# Patient Record
Sex: MALE | Race: WHITE | NOT HISPANIC OR LATINO | Employment: FULL TIME | URBAN - METROPOLITAN AREA
[De-identification: names, ages, dates, MRNs, and addresses within clinical notes are randomized per-mention and may not be internally consistent; named-entity substitution may affect disease eponyms.]

---

## 2017-12-15 ENCOUNTER — OFFICE VISIT (OUTPATIENT)
Dept: URGENT CARE | Facility: CLINIC | Age: 32
End: 2017-12-15
Payer: COMMERCIAL

## 2017-12-15 PROCEDURE — 99203 OFFICE O/P NEW LOW 30 MIN: CPT

## 2017-12-16 NOTE — PROGRESS NOTES
Assessment  1  Viral gastroenteritis (008 8) (A08 4)    Discussion/Summary  Discussion Summary:   Appears viral  Recommend small sips of water to stay hydrated  Advance diet as tolerated  Medication Side Effects Reviewed: Possible side effects of new medications were reviewed with the patient/guardian today  Understands and agrees with treatment plan: The treatment plan was reviewed with the patient/guardian  The patient/guardian understands and agrees with the treatment plan      Chief Complaint  1  Diarrhea  Chief Complaint Free Text Note Form: Pt c/o diarrhea and vomiting since yesterday  History of Present Illness  HPI: 74-year-old male here with diarrhea vomiting that started yesterday  Was feeling a little nauseous  Has been able to hold down some fluids  Denies any fever or chills  States that he needs a note for work  Diarrhea: Dulcy Maddison presents with complaints of diarrhea  Associated symptoms include nausea-- and-- vomiting, but-- no fever-- and-- no chills  Review of Systems  Focused-Male:  Constitutional: feeling poorly-- and-- feeling tired, but-- as noted in HPI,-- no fever-- and-- no chills  ENT: no complaints of earache, no loss of hearing, no nosebleeds or nasal discharge, no sore throat or hoarseness  Respiratory: no complaints of shortness of breath, no wheezing or cough, no dyspnea on exertion, no orthopnea or PND  Gastrointestinal: abdominal pain,-- nausea,-- vomiting-- and-- diarrhea, but-- as noted in HPI  ROS Reviewed:   ROS reviewed  Past Medical History  Active Problems And Past Medical History Reviewed: The active problems and past medical history were reviewed and updated today  Family History  Family History Reviewed: The family history was reviewed and updated today  Social History  Social History Reviewed: The social history was reviewed and updated today  The social history was reviewed and is unchanged        Surgical History  Surgical History Reviewed: The surgical history was reviewed and updated today  Current Meds   1  Januvia 25 MG Oral Tablet; Therapy: (Recorded:[id number]) to Recorded   2  MetFORMIN HCl - 500 MG Oral Tablet; Therapy: (Recorded:[id number]) to Recorded  Medication List Reviewed: The medication list was reviewed and updated today  Allergies  1  No Known Drug Allergies    Vitals  Signs   Recorded: [id number] 01:33PM   Temperature: 97 F  Heart Rate: 83  Respiration: 18  Systolic: 325  Diastolic: 78  Weight: 233 lb 0 22 oz  O2 Saturation: 96    Physical Exam   Constitutional  General appearance: No acute distress, well appearing and well nourished  Ears, Nose, Mouth, and Throat  External inspection of ears and nose: Normal    Otoscopic examination: Tympanic membrance translucent with normal light reflex  Canals patent without erythema  Nasal mucosa, septum, and turbinates: Normal without edema or erythema  Oropharynx: Normal with no erythema, edema, exudate or lesions  Pulmonary  Respiratory effort: No increased work of breathing or signs of respiratory distress  Auscultation of lungs: Clear to auscultation  Cardiovascular  Auscultation of heart: Normal rate and rhythm, normal S1 and S2, without murmurs  Abdomen  Abdomen: Non-tender, no masses         Signatures   Electronically signed by : Redd Abel [facility]; Dec 15 2017  1:50PM EST                       (Author)    Electronically signed by : JESSICA Garcia ; Dec 15 2017  4:06PM EST                       (Co-author)

## 2018-01-02 ENCOUNTER — OFFICE VISIT (OUTPATIENT)
Dept: URGENT CARE | Facility: CLINIC | Age: 33
End: 2018-01-02
Payer: COMMERCIAL

## 2018-01-02 PROCEDURE — 99213 OFFICE O/P EST LOW 20 MIN: CPT

## 2018-01-04 NOTE — PROGRESS NOTES
Assessment   1  Acute maxillary sinusitis, recurrence not specified (461 0) (J01 00)    Plan   Acute maxillary sinusitis, recurrence not specified    · Amoxicillin-Pot Clavulanate 875-125 MG Oral Tablet (Augmentin); TAKE 1 TABLET    EVERY 12 HOURS UNTIL GONE    Discussion/Summary   Discussion Summary:    Augmentin as directed  Discussed viral versus bacterial versus allergic infection  Call or return for problems and concerns  Medication Side Effects Reviewed: Possible side effects of new medications were reviewed with the patient/guardian today  Understands and agrees with treatment plan: The treatment plan was reviewed with the patient/guardian  The patient/guardian understands and agrees with the treatment plan      Chief Complaint   1  Cold Symptoms  Chief Complaint Free Text Note Form: Ongoing non-productive cough and congestion x 6 weeks sometimes like water comes up when I cough      History of Present Illness   HPI: sinus congestion and cough x 6 weeks      Review of Systems   Focused-Male:      Constitutional: no fever or chills, feels well, no tiredness, no recent weight loss or weight gain  ENT: nasal discharge  Cardiovascular: no complaints of slow or fast heart rate, no chest pain, no palpitations, no leg claudication or lower extremity edema  Respiratory: cough  Gastrointestinal: no complaints of abdominal pain, no constipation, no nausea or vomiting, no diarrhea or bloody stools  Genitourinary: no complaints of dysuria or incontinence, no hesitancy, no nocturia, no genital lesion, no inadequacy of penile erection  Musculoskeletal: no complaints of arthralgia, no myalgia, no joint swelling or stiffness, no limb pain or swelling  Integumentary: no complaints of skin rash or lesion, no itching or dry skin, no skin wounds  Neurological: no complaints of headache, no confusion, no numbness or tingling, no dizziness or fainting  Active Problems   1   Viral gastroenteritis (008 8) (A08 4)    Past Medical History   Active Problems And Past Medical History Reviewed: The active problems and past medical history were reviewed and updated today  Family History   Family History Reviewed: The family history was reviewed and updated today  Social History   Social History Reviewed: The social history was reviewed and updated today  Surgical History   Surgical History Reviewed: The surgical history was reviewed and updated today  Current Meds    1  Januvia 25 MG Oral Tablet; Therapy: (Recorded:[id number]) to Recorded   2  MetFORMIN HCl - 500 MG Oral Tablet; Therapy: (Recorded:[id number]) to Recorded  Medication List Reviewed: The medication list was reviewed and updated today  Allergies   1  No Known Drug Allergies    Vitals   Signs   Recorded: 02Jan2018 02:22PM   Temperature: 97 8 F  Heart Rate: 96  Respiration: 18  Systolic: 690  Diastolic: 64  O2 Saturation: 100    Physical Exam        Constitutional      General appearance: No acute distress, well appearing and well nourished  Eyes      Conjunctiva and lids: No swelling, erythema, or discharge  Ears, Nose, Mouth, and Throat      External inspection of ears and nose: Normal        Otoscopic examination: Tympanic membrance translucent with normal light reflex  Canals patent without erythema  Nasal mucosa, septum, and turbinates: Normal without edema or erythema  Oropharynx: Normal with no erythema, edema, exudate or lesions  -- + sinus congestion noted  Pulmonary      Respiratory effort: No increased work of breathing or signs of respiratory distress  Auscultation of lungs: Clear to auscultation  Cardiovascular      Auscultation of heart: Normal rate and rhythm, normal S1 and S2, without murmurs  Lymphatic      Palpation of lymph nodes in neck: No lymphadenopathy         Musculoskeletal      Gait and station: Normal        Digits and nails: Normal without clubbing or cyanosis  Inspection/palpation of joints, bones, and muscles: Normal        Neurologic      Cranial nerves: Cranial nerves 2-12 intact         Psychiatric      Orientation to person, place and time: Normal        Mood and affect: Normal        Signatures    Electronically signed by : Adrian Tompkins; Jan 2 2018  3:26PM EST                       (Author)     Electronically signed by : Adrian Tompkins; Jan 2 2018  3:55PM EST                       (Author)     Electronically signed by : JESSICA Frank ; Tru  3 2018  5:23PM EST                       (Co-author)

## 2018-01-23 VITALS
HEART RATE: 83 BPM | DIASTOLIC BLOOD PRESSURE: 78 MMHG | SYSTOLIC BLOOD PRESSURE: 133 MMHG | OXYGEN SATURATION: 96 % | TEMPERATURE: 97 F | RESPIRATION RATE: 18 BRPM | WEIGHT: 276.02 LBS

## 2018-01-23 VITALS
HEART RATE: 96 BPM | RESPIRATION RATE: 18 BRPM | TEMPERATURE: 97.8 F | OXYGEN SATURATION: 100 % | DIASTOLIC BLOOD PRESSURE: 64 MMHG | SYSTOLIC BLOOD PRESSURE: 122 MMHG

## 2018-01-24 NOTE — MISCELLANEOUS
Message  Return to work or school:   Naun Reyes is under my professional care   He was seen in my office on 12/15/17   He is able to return to work on  12/16/17            Signatures   Electronically signed by : Erik Quinonez, [facility]; Dec 15 2017  1:44PM EST                       (Author)

## 2018-03-09 ENCOUNTER — OFFICE VISIT (OUTPATIENT)
Dept: URGENT CARE | Facility: CLINIC | Age: 33
End: 2018-03-09
Payer: COMMERCIAL

## 2018-03-09 VITALS
SYSTOLIC BLOOD PRESSURE: 162 MMHG | DIASTOLIC BLOOD PRESSURE: 93 MMHG | HEART RATE: 82 BPM | RESPIRATION RATE: 18 BRPM | OXYGEN SATURATION: 96 % | TEMPERATURE: 97.6 F

## 2018-03-09 DIAGNOSIS — K52.9 NONINFECTIOUS GASTROENTERITIS, UNSPECIFIED TYPE: Primary | ICD-10-CM

## 2018-03-09 PROCEDURE — 99213 OFFICE O/P EST LOW 20 MIN: CPT | Performed by: NURSE PRACTITIONER

## 2018-03-09 NOTE — PROGRESS NOTES
[facility]        NAME: Jackson Paige is a 34 y o  male  : 1985    MRN: [id number]  DATE: 2018  TIME: 2:11 PM    Assessment and Plan   Noninfectious gastroenteritis, unspecified type [K52 9]  1  Noninfectious gastroenteritis, unspecified type           Patient Instructions       Follow up with PCP in 3-5 days  Proceed to  ER if symptoms worsen  Chief Complaint     Chief Complaint   Patient presents with    Diarrhea     Pt c/o diarrhea and a fever since yesterday  History of Present Illness       Diarrhea    The current episode started yesterday  Episode frequency: 4-5 times  The stool consistency is described as watery  Associated symptoms include abdominal pain, a fever (last night) and headaches  Pertinent negatives include no vomiting  Review of Systems   Review of Systems   Constitutional: Positive for fever (last night)  HENT: Negative  Eyes: Negative  Respiratory: Negative  Cardiovascular: Negative  Gastrointestinal: Positive for abdominal pain and diarrhea  Negative for vomiting  Genitourinary: Negative  Musculoskeletal: Negative  Neurological: Positive for headaches  Psychiatric/Behavioral: Negative  Current Medications       Current Outpatient Prescriptions:     metFORMIN (GLUCOPHAGE) 500 mg tablet, Take by mouth, Disp: , Rfl:     sitaGLIPtin (JANUVIA) 25 mg tablet, Take by mouth, Disp: , Rfl:     Current Allergies     Allergies as of 2018    (No Known Allergies)            The following portions of the patient's history were reviewed and updated as appropriate: allergies, current medications, past family history, past medical history, past social history, past surgical history and problem list      No past medical history on file  No past surgical history on file  No family history on file  Medications have been verified          Objective   /93   Pulse 82   Temp 97 6 °F (36 4 °C)   Resp 18   SpO2 96%        Physical Exam     Physical Exam   Constitutional: He is oriented to person, place, and time  He appears well-developed and well-nourished  No distress  HENT:   Head: Normocephalic and atraumatic  Right Ear: External ear normal    Left Ear: External ear normal    Mouth/Throat: Oropharynx is clear and moist    Eyes: Conjunctivae and EOM are normal  Pupils are equal, round, and reactive to light  Neck: Normal range of motion  Neck supple  Cardiovascular: Normal rate, regular rhythm and normal heart sounds  Pulmonary/Chest: Effort normal and breath sounds normal  No respiratory distress  He has no wheezes  He has no rales  Abdominal: Bowel sounds are normal  He exhibits no distension and no mass  There is no tenderness  There is no rebound and no guarding  Lymphadenopathy:     He has no cervical adenopathy  Neurological: He is alert and oriented to person, place, and time  He has normal reflexes  Skin: Skin is warm and dry  No rash noted  He is not diaphoretic  Psychiatric: He has a normal mood and affect  Nursing note and vitals reviewed  Advised him to adhere to BRAT diet for the next 48 hours  Avoid dairy products the next 48 hours

## 2018-03-09 NOTE — PATIENT INSTRUCTIONS
Gastroenteritis   WHAT YOU NEED TO KNOW:   Gastroenteritis, or stomach flu, is an infection of the stomach and intestines  DISCHARGE INSTRUCTIONS:   Call 911 for any of the following:   · You have trouble breathing or a very fast pulse  Return to the emergency department if:   · You see blood in your diarrhea  · You cannot stop vomiting  · You have not urinated for 12 hours  · You feel like you are going to faint  Contact your healthcare provider if:   · You have a fever  · You continue to vomit or have diarrhea, even after treatment  · You see worms in your diarrhea  · Your mouth or eyes are dry  You are not urinating as much or as often  · You have questions or concerns about your condition or care  Medicines:   · Medicines  may be given to stop vomiting or diarrhea, decrease abdominal cramps, or treat an infection  · Take your medicine as directed  Contact your healthcare provider if you think your medicine is not helping or if you have side effects  Tell him or her if you are allergic to any medicine  Keep a list of the medicines, vitamins, and herbs you take  Include the amounts, and when and why you take them  Bring the list or the pill bottles to follow-up visits  Carry your medicine list with you in case of an emergency  Manage your symptoms:   · Drink liquids as directed  Ask your healthcare provider how much liquid to drink each day, and which liquids are best for you  You may also need to drink an oral rehydration solution (ORS)  An ORS has the right amounts of sugar, salt, and minerals in water to replace body fluids  · Eat bland foods  When you feel hungry, begin eating soft, bland foods  Examples are bananas, clear soup, potatoes, and applesauce  Do not have dairy products, alcohol, sugary drinks, or drinks with caffeine until you feel better  · Rest as much as possible  Slowly start to do more each day when you begin to feel better    Prevent the spread of gastroenteritis:  Gastroenteritis can spread easily  Keep yourself, your family, and your surroundings clean to help prevent the spread of gastroenteritis:  · Wash your hands often  Use soap and water  Wash your hands after you use the bathroom, change a child's diapers, or sneeze  Wash your hands before you prepare or eat food  · Clean surfaces and do laundry often  Wash your clothes and towels separately from the rest of the laundry  Clean surfaces in your home with antibacterial  or bleach  · Clean food thoroughly and cook safely  Wash raw vegetables before you cook  Cook meat, fish, and eggs fully  Do not use the same dishes for raw meat as you do for other foods  Refrigerate any leftover food immediately  · Be aware when you camp or travel  Drink only clean water  Do not drink from rivers or lakes unless you purify or boil the water first  When you travel, drink bottled water and do not add ice  Do not eat fruit that has not been peeled  Do not eat raw fish or meat that is not fully cooked  Follow up with your healthcare provider as directed:  Write down your questions so you remember to ask them during your visits  © 2017 [employer] Information is for End User's use only and may not be sold, redistributed or otherwise used for commercial purposes  All illustrations and images included in CareNotes® are the copyrighted property of A [employer]  or Jovani Santos  The above information is an  only  It is not intended as medical advice for individual conditions or treatments  Talk to your doctor, nurse or pharmacist before following any medical regimen to see if it is safe and effective for you

## 2019-03-27 ENCOUNTER — HOSPITAL ENCOUNTER (EMERGENCY)
Facility: HOSPITAL | Age: 34
Discharge: HOME | End: 2019-03-27
Attending: EMERGENCY MEDICINE | Admitting: EMERGENCY MEDICINE
Payer: COMMERCIAL

## 2019-03-27 VITALS
SYSTOLIC BLOOD PRESSURE: 188 MMHG | HEART RATE: 77 BPM | TEMPERATURE: 98 F | DIASTOLIC BLOOD PRESSURE: 88 MMHG | BODY MASS INDEX: 31.71 KG/M2 | RESPIRATION RATE: 16 BRPM | OXYGEN SATURATION: 98 % | HEIGHT: 75 IN | WEIGHT: 255 LBS

## 2019-03-27 DIAGNOSIS — R73.9 HYPERGLYCEMIA: Primary | ICD-10-CM

## 2019-03-27 DIAGNOSIS — R51.9 ACUTE NONINTRACTABLE HEADACHE, UNSPECIFIED HEADACHE TYPE: ICD-10-CM

## 2019-03-27 DIAGNOSIS — E11.8 TYPE 2 DIABETES MELLITUS WITH COMPLICATION, WITHOUT LONG-TERM CURRENT USE OF INSULIN (HCC): ICD-10-CM

## 2019-03-27 LAB
ANION GAP SERPL CALCULATED.3IONS-SCNC: 14 MMOL/L (ref 4–13)
BACTERIA UR QL AUTO: ABNORMAL /HPF
BASOPHILS # BLD AUTO: 0.1 THOUSANDS/ΜL (ref 0–0.1)
BASOPHILS NFR BLD AUTO: 1 % (ref 0–2)
BILIRUB UR QL STRIP: ABNORMAL
BUN SERPL-MCNC: 14 MG/DL (ref 7–25)
CALCIUM SERPL-MCNC: 9.6 MG/DL (ref 8.6–10.5)
CHLORIDE SERPL-SCNC: 97 MMOL/L (ref 98–107)
CLARITY UR: CLEAR
CO2 SERPL-SCNC: 19 MMOL/L (ref 21–31)
COLOR UR: YELLOW
CREAT SERPL-MCNC: 0.81 MG/DL (ref 0.7–1.3)
EOSINOPHIL # BLD AUTO: 0.2 THOUSAND/ΜL (ref 0–0.61)
EOSINOPHIL NFR BLD AUTO: 2 % (ref 0–5)
ERYTHROCYTE [DISTWIDTH] IN BLOOD BY AUTOMATED COUNT: 12.7 % (ref 11.5–14.5)
GFR SERPL CREATININE-BSD FRML MDRD: 116 ML/MIN/1.73SQ M
GLUCOSE SERPL-MCNC: 274 MG/DL (ref 65–140)
GLUCOSE SERPL-MCNC: 312 MG/DL (ref 65–99)
GLUCOSE UR STRIP-MCNC: ABNORMAL MG/DL
HCT VFR BLD AUTO: 40.2 % (ref 42–47)
HGB BLD-MCNC: 13.8 G/DL (ref 14–18)
HGB UR QL STRIP.AUTO: NEGATIVE
KETONES UR STRIP-MCNC: ABNORMAL MG/DL
LEUKOCYTE ESTERASE UR QL STRIP: NEGATIVE
LIPASE SERPL-CCNC: 54 U/L (ref 11–82)
LYMPHOCYTES # BLD AUTO: 2.4 THOUSANDS/ΜL (ref 0.6–4.47)
LYMPHOCYTES NFR BLD AUTO: 20 % (ref 21–51)
MCH RBC QN AUTO: 29.9 PG (ref 26–34)
MCHC RBC AUTO-ENTMCNC: 34.1 G/DL (ref 31–37)
MCV RBC AUTO: 85 FL (ref 81–99)
MONOCYTES # BLD AUTO: 0.8 THOUSAND/ΜL (ref 0.17–1.22)
MONOCYTES NFR BLD AUTO: 7 % (ref 2–12)
MUCOUS THREADS UR QL AUTO: ABNORMAL
NEUTROPHILS # BLD AUTO: 8.6 THOUSANDS/ΜL (ref 1.4–6.5)
NEUTS SEG NFR BLD AUTO: 71 % (ref 42–75)
NITRITE UR QL STRIP: NEGATIVE
NON-SQ EPI CELLS URNS QL MICRO: ABNORMAL /HPF
PH UR STRIP.AUTO: 5.5 [PH]
PLATELET # BLD AUTO: 324 THOUSANDS/UL (ref 149–390)
PLATELET BLD QL SMEAR: ADEQUATE
PMV BLD AUTO: 8.1 FL (ref 8.6–11.7)
POTASSIUM SERPL-SCNC: 4.3 MMOL/L (ref 3.5–5.5)
PROT UR STRIP-MCNC: ABNORMAL MG/DL
RBC # BLD AUTO: 4.71 MILLION/UL (ref 4.3–5.9)
RBC #/AREA URNS AUTO: ABNORMAL /HPF
RBC MORPH BLD: NORMAL
SODIUM SERPL-SCNC: 130 MMOL/L (ref 134–143)
SP GR UR STRIP.AUTO: >=1.03 (ref 1–1.03)
UROBILINOGEN UR QL STRIP.AUTO: 0.2 E.U./DL
WBC # BLD AUTO: 12.1 THOUSAND/UL (ref 4.8–10.8)
WBC #/AREA URNS AUTO: ABNORMAL /HPF

## 2019-03-27 PROCEDURE — 99285 EMERGENCY DEPT VISIT HI MDM: CPT

## 2019-03-27 PROCEDURE — 96361 HYDRATE IV INFUSION ADD-ON: CPT

## 2019-03-27 PROCEDURE — 81001 URINALYSIS AUTO W/SCOPE: CPT | Performed by: PHYSICIAN ASSISTANT

## 2019-03-27 PROCEDURE — 82948 REAGENT STRIP/BLOOD GLUCOSE: CPT

## 2019-03-27 PROCEDURE — 85025 COMPLETE CBC W/AUTO DIFF WBC: CPT | Performed by: PHYSICIAN ASSISTANT

## 2019-03-27 PROCEDURE — 80048 BASIC METABOLIC PNL TOTAL CA: CPT | Performed by: PHYSICIAN ASSISTANT

## 2019-03-27 PROCEDURE — 36415 COLL VENOUS BLD VENIPUNCTURE: CPT | Performed by: PHYSICIAN ASSISTANT

## 2019-03-27 PROCEDURE — 96374 THER/PROPH/DIAG INJ IV PUSH: CPT

## 2019-03-27 PROCEDURE — 83690 ASSAY OF LIPASE: CPT | Performed by: PHYSICIAN ASSISTANT

## 2019-03-27 RX ORDER — ACETAMINOPHEN 325 MG/1
975 TABLET ORAL ONCE
Status: COMPLETED | OUTPATIENT
Start: 2019-03-27 | End: 2019-03-27

## 2019-03-27 RX ADMIN — FAMOTIDINE 20 MG: 10 INJECTION INTRAVENOUS at 14:48

## 2019-03-27 RX ADMIN — SODIUM CHLORIDE 1000 ML: 0.9 INJECTION, SOLUTION INTRAVENOUS at 14:47

## 2019-03-27 RX ADMIN — ACETAMINOPHEN 975 MG: 325 TABLET ORAL at 15:54

## 2019-03-28 ENCOUNTER — HOSPITAL ENCOUNTER (EMERGENCY)
Facility: HOSPITAL | Age: 34
Discharge: HOME | End: 2019-03-28
Attending: EMERGENCY MEDICINE | Admitting: EMERGENCY MEDICINE
Payer: COMMERCIAL

## 2019-03-28 ENCOUNTER — APPOINTMENT (EMERGENCY)
Dept: RADIOLOGY | Facility: HOSPITAL | Age: 34
End: 2019-03-28
Payer: COMMERCIAL

## 2019-03-28 ENCOUNTER — OFFICE VISIT (OUTPATIENT)
Dept: URGENT CARE | Facility: CLINIC | Age: 34
End: 2019-03-28
Payer: COMMERCIAL

## 2019-03-28 VITALS
WEIGHT: 255.73 LBS | HEART RATE: 100 BPM | DIASTOLIC BLOOD PRESSURE: 78 MMHG | RESPIRATION RATE: 18 BRPM | SYSTOLIC BLOOD PRESSURE: 136 MMHG | OXYGEN SATURATION: 95 % | TEMPERATURE: 97.3 F | BODY MASS INDEX: 31.96 KG/M2

## 2019-03-28 VITALS
HEART RATE: 93 BPM | DIASTOLIC BLOOD PRESSURE: 82 MMHG | SYSTOLIC BLOOD PRESSURE: 126 MMHG | RESPIRATION RATE: 18 BRPM | TEMPERATURE: 98.8 F | OXYGEN SATURATION: 97 %

## 2019-03-28 DIAGNOSIS — R10.9 ABDOMINAL PAIN: Primary | ICD-10-CM

## 2019-03-28 DIAGNOSIS — R10.32 LLQ ABDOMINAL PAIN: Primary | ICD-10-CM

## 2019-03-28 LAB
ALBUMIN SERPL BCP-MCNC: 3.3 G/DL (ref 3.5–5)
ALP SERPL-CCNC: 72 U/L (ref 46–116)
ALT SERPL W P-5'-P-CCNC: 41 U/L (ref 12–78)
ANION GAP SERPL CALCULATED.3IONS-SCNC: 8 MMOL/L (ref 4–13)
AST SERPL W P-5'-P-CCNC: 59 U/L (ref 5–45)
BACTERIA UR QL AUTO: ABNORMAL /HPF
BASOPHILS # BLD AUTO: 0.04 THOUSANDS/ΜL (ref 0–0.1)
BASOPHILS NFR BLD AUTO: 0 % (ref 0–1)
BILIRUB SERPL-MCNC: 0.5 MG/DL (ref 0.2–1)
BILIRUB UR QL STRIP: ABNORMAL
BUN SERPL-MCNC: 13 MG/DL (ref 5–25)
CALCIUM SERPL-MCNC: 8.9 MG/DL (ref 8.3–10.1)
CHLORIDE SERPL-SCNC: 97 MMOL/L (ref 100–108)
CLARITY UR: CLEAR
CO2 SERPL-SCNC: 24 MMOL/L (ref 21–32)
COLOR UR: YELLOW
COLOR, POC: NORMAL
CREAT SERPL-MCNC: 0.91 MG/DL (ref 0.6–1.3)
EOSINOPHIL # BLD AUTO: 0.15 THOUSAND/ΜL (ref 0–0.61)
EOSINOPHIL NFR BLD AUTO: 2 % (ref 0–6)
ERYTHROCYTE [DISTWIDTH] IN BLOOD BY AUTOMATED COUNT: 12.6 % (ref 11.6–15.1)
GFR SERPL CREATININE-BSD FRML MDRD: 110 ML/MIN/1.73SQ M
GLUCOSE SERPL-MCNC: 237 MG/DL (ref 65–140)
GLUCOSE UR STRIP-MCNC: ABNORMAL MG/DL
HCT VFR BLD AUTO: 40.3 % (ref 36.5–49.3)
HGB BLD-MCNC: 14.5 G/DL (ref 12–17)
HGB UR QL STRIP.AUTO: NEGATIVE
HYALINE CASTS #/AREA URNS LPF: ABNORMAL /LPF
IMM GRANULOCYTES # BLD AUTO: 0.06 THOUSAND/UL (ref 0–0.2)
IMM GRANULOCYTES NFR BLD AUTO: 1 % (ref 0–2)
KETONES UR STRIP-MCNC: ABNORMAL MG/DL
LEUKOCYTE ESTERASE UR QL STRIP: NEGATIVE
LIPASE SERPL-CCNC: 111 U/L (ref 73–393)
LYMPHOCYTES # BLD AUTO: 2.37 THOUSANDS/ΜL (ref 0.6–4.47)
LYMPHOCYTES NFR BLD AUTO: 25 % (ref 14–44)
MCH RBC QN AUTO: 30.6 PG (ref 26.8–34.3)
MCHC RBC AUTO-ENTMCNC: 35.9 G/DL (ref 31.4–37.4)
MCV RBC AUTO: 85 FL (ref 82–98)
MONOCYTES # BLD AUTO: 0.58 THOUSAND/ΜL (ref 0.17–1.22)
MONOCYTES NFR BLD AUTO: 6 % (ref 4–12)
NEUTROPHILS # BLD AUTO: 6.14 THOUSANDS/ΜL (ref 1.85–7.62)
NEUTS SEG NFR BLD AUTO: 66 % (ref 43–75)
NITRITE UR QL STRIP: NEGATIVE
NON-SQ EPI CELLS URNS QL MICRO: ABNORMAL /HPF
NRBC BLD AUTO-RTO: 0 /100 WBCS
PH UR STRIP.AUTO: 5.5 [PH] (ref 4.5–8)
PLATELET # BLD AUTO: 306 THOUSANDS/UL (ref 149–390)
PMV BLD AUTO: 10.4 FL (ref 8.9–12.7)
POTASSIUM SERPL-SCNC: 4.9 MMOL/L (ref 3.5–5.3)
PROT SERPL-MCNC: 7.5 G/DL (ref 6.4–8.2)
PROT UR STRIP-MCNC: ABNORMAL MG/DL
RBC # BLD AUTO: 4.74 MILLION/UL (ref 3.88–5.62)
RBC #/AREA URNS AUTO: ABNORMAL /HPF
SODIUM SERPL-SCNC: 129 MMOL/L (ref 136–145)
SP GR UR STRIP.AUTO: >=1.03 (ref 1–1.03)
UROBILINOGEN UR QL STRIP.AUTO: 0.2 E.U./DL
WBC # BLD AUTO: 9.34 THOUSAND/UL (ref 4.31–10.16)
WBC #/AREA URNS AUTO: ABNORMAL /HPF

## 2019-03-28 PROCEDURE — 99212 OFFICE O/P EST SF 10 MIN: CPT | Performed by: PHYSICIAN ASSISTANT

## 2019-03-28 PROCEDURE — 36415 COLL VENOUS BLD VENIPUNCTURE: CPT | Performed by: EMERGENCY MEDICINE

## 2019-03-28 PROCEDURE — 80053 COMPREHEN METABOLIC PANEL: CPT | Performed by: EMERGENCY MEDICINE

## 2019-03-28 PROCEDURE — 99284 EMERGENCY DEPT VISIT MOD MDM: CPT

## 2019-03-28 PROCEDURE — 85025 COMPLETE CBC W/AUTO DIFF WBC: CPT | Performed by: EMERGENCY MEDICINE

## 2019-03-28 PROCEDURE — 81001 URINALYSIS AUTO W/SCOPE: CPT

## 2019-03-28 PROCEDURE — 74177 CT ABD & PELVIS W/CONTRAST: CPT

## 2019-03-28 PROCEDURE — 83690 ASSAY OF LIPASE: CPT | Performed by: EMERGENCY MEDICINE

## 2019-03-28 RX ORDER — DICYCLOMINE HCL 20 MG
20 TABLET ORAL 2 TIMES DAILY
Qty: 10 TABLET | Refills: 0 | Status: SHIPPED | OUTPATIENT
Start: 2019-03-28 | End: 2019-04-02

## 2019-03-28 RX ADMIN — IOHEXOL 100 ML: 350 INJECTION, SOLUTION INTRAVENOUS at 18:40

## 2019-03-28 NOTE — ED NOTES
Notes Blood Sugars have been "better" since this morning, but are still high     Kavon Winston, MARJORIE  03/28/19 0244

## 2019-03-28 NOTE — ED ATTENDING ATTESTATION
Hernesto Sanches DO, saw and evaluated the patient  I have discussed the patient with the resident/non-physician practitioner and agree with the resident's/non-physician practitioner's findings, Plan of Care, and MDM as documented in the resident's/non-physician practitioner's note, except where noted  All available labs and Radiology studies were reviewed  I was present for key portions of any procedure(s) performed by the resident/non-physician practitioner and I was immediately available to provide assistance  At this point I agree with the current assessment done in the Emergency Department  I have conducted an independent evaluation of this patient a history and physical is as follows:    29 yo male presents for evaluation of dull LLQ pain radiating to L flank which has been ongoing for about 10 days  Denies specific trigger  No a/e factors  Rated 4/10  Has mild intermittent nausea  No vomiting, diarrhea, f/c, urinary sxs  Had normal BM today  Reports "night sweats" nightly for about a week  Hasn't taken his temp at home  Afebrile today  abd snd mild to TTP LLQ no r/g bs+    Hx NIDDM  Imp: LLQ pain ddx broad plan: UA, abd labs, CT abd/pelvis with contrast  tx sx, reassess        Critical Care Time  Procedures

## 2019-03-28 NOTE — PROGRESS NOTES
[facility]        NAME: Ashley Dueñas is a 28 y o  male  : 1985    MRN: [id number]  DATE: 2019  TIME: 2:01 PM    Assessment and Plan   LLQ abdominal pain [R10 32]  1  LLQ abdominal pain  Transfer to other facility         Patient Instructions       Go to the emergency room for further evaluation abdominal pain  Chief Complaint     Chief Complaint   Patient presents with    Abdominal Pain     Pt c/o abdominal pain since yesterday  History of Present Illness       Patient presents with left lower quadrant abdominal pain off and on for the past 2 weeks  Pain started 2 days ago and is slightly more intense  Patient is having intermittent nausea specially after eating  He states that the left lower quadrant pain radiates to his back  He denies any diarrhea vomiting or blood in stool  He states when he tries to move his bowels feels like he has to go a but nothing is moved  He denies any fever chills or prior history of diverticulosis  Review of Systems   Review of Systems   Constitutional: Negative for chills and fever  Respiratory: Negative for cough  Cardiovascular: Negative for chest pain  Gastrointestinal: Positive for abdominal pain and nausea  Negative for blood in stool, diarrhea and vomiting  Genitourinary: Negative for difficulty urinating  Hematological: Negative for adenopathy  Current Medications       Current Outpatient Medications:     metFORMIN (GLUCOPHAGE) 500 mg tablet, Take by mouth, Disp: , Rfl:     metFORMIN (GLUCOPHAGE) 500 mg tablet, Take 1 tablet (500 mg total) by mouth 2 (two) times a day with meals for 30 days, Disp: 60 tablet, Rfl: 0    sitaGLIPtin (JANUVIA) 25 mg tablet, Take by mouth, Disp: , Rfl:   No current facility-administered medications for this visit       Current Allergies     Allergies as of 2019    (No Known Allergies)            The following portions of the patient's history were reviewed and updated as appropriate: allergies, current medications, past family history, past medical history, past social history, past surgical history and problem list      Past Medical History:   Diagnosis Date    Diabetes mellitus ([address] )        No past surgical history on file  No family history on file  Medications have been verified  Objective   /82   Pulse 93   Temp 98 8 °F (37 1 °C)   Resp 18   SpO2 97%        Physical Exam     Physical Exam   Constitutional: He is oriented to person, place, and time  He appears well-developed and well-nourished  HENT:   Head: Normocephalic and atraumatic  Cardiovascular: Normal rate and regular rhythm  Pulmonary/Chest: Effort normal    Abdominal: Soft  Normal appearance and bowel sounds are normal  There is tenderness in the left lower quadrant  There is no rebound and no guarding  Neurological: He is alert and oriented to person, place, and time  Skin: Skin is warm and dry  Psychiatric: He has a normal mood and affect  His behavior is normal    Nursing note and vitals reviewed  [facility] Emergency room notified of patient

## 2019-03-28 NOTE — ED PROVIDER NOTES
History  Chief Complaint   Patient presents with    Abdominal Pain     c/o LLQ abdominal pain x 1 week, was seen yesterday at ER for high glucose     76-year-old male with past history of diabetes on oral glycemic control not on insulin who presents with abdominal pain times 10 days  Gradual onset, no specific trigger, as the dull pain in the left lower quadrant radiating to left flank and back  Patient also states he has been feeling mildly nauseated after he eats but does not vomit, a normal bowel movement today, denies bloody or melanotic stools, denies diarrhea  Denies dysuria or hematuria  States he has had intermittent night sweats over the past week but has not taken his temperature at home  Prior to Admission Medications   Prescriptions Last Dose Informant Patient Reported? Taking?   metFORMIN (GLUCOPHAGE) 500 mg tablet   Yes No   Sig: Take by mouth   metFORMIN (GLUCOPHAGE) 500 mg tablet 3/28/2019 at Unknown time  No Yes   Sig: Take 1 tablet (500 mg total) by mouth 2 (two) times a day with meals for 30 days   sitaGLIPtin (JANUVIA) 25 mg tablet Not Taking at Unknown time  Yes No   Sig: Take by mouth      Facility-Administered Medications: None       Past Medical History:   Diagnosis Date    Diabetes mellitus ([facility] )        History reviewed  No pertinent surgical history  History reviewed  No pertinent family history  I have reviewed and agree with the history as documented  Social History     Tobacco Use    Smoking status: Current Every Day Smoker     Types: E-Cigarettes    Smokeless tobacco: Never Used   Substance Use Topics    Alcohol use: Not Currently    Drug use: Not Currently        Review of Systems   Constitutional: Positive for chills  Negative for fever  HENT: Negative for congestion and sore throat  Eyes: Negative for photophobia and visual disturbance  Respiratory: Negative for cough and shortness of breath      Cardiovascular: Negative for chest pain and leg swelling  Gastrointestinal: Positive for abdominal pain and nausea  Negative for blood in stool, diarrhea and vomiting  Genitourinary: Negative for dysuria and hematuria  Musculoskeletal: Negative for neck pain and neck stiffness  Skin: Negative for rash and wound  Neurological: Negative for weakness and numbness  Psychiatric/Behavioral: Negative for behavioral problems and confusion  All other systems reviewed and are negative  Physical Exam  ED Triage Vitals [03/28/19 1730]   Temperature Pulse Respirations Blood Pressure SpO2   (!) 97 3 °F (36 3 °C) 104 18 136/82 96 %      Temp Source Heart Rate Source Patient Position - Orthostatic VS BP Location FiO2 (%)   Tympanic Monitor Sitting Right arm --      Pain Score       4             Orthostatic Vital Signs  Vitals:    03/28/19 1730 03/28/19 1854 03/28/19 2030   BP: 136/82 134/76 136/78   Pulse: 104 102 100   Patient Position - Orthostatic VS: Sitting Lying Lying       Physical Exam   Constitutional: He appears well-developed  No distress  HENT:   Head: Normocephalic and atraumatic  Right Ear: External ear normal    Left Ear: External ear normal    Nose: Nose normal    Mouth/Throat: Oropharynx is clear and moist    Eyes: Pupils are equal, round, and reactive to light  Conjunctivae and EOM are normal  Right eye exhibits no discharge  Left eye exhibits no discharge  Neck: Normal range of motion  Neck supple  Cardiovascular: Normal rate, regular rhythm, normal heart sounds and intact distal pulses  Exam reveals no gallop and no friction rub  No murmur heard  Pulmonary/Chest: Effort normal and breath sounds normal  No stridor  No respiratory distress  He has no wheezes  He has no rales  He exhibits no tenderness  Abdominal: Soft  Bowel sounds are normal  He exhibits no distension  There is tenderness in the left lower quadrant  There is no rigidity, no rebound, no guarding and no CVA tenderness  Musculoskeletal: He exhibits no edema  Neurological: He is alert  Skin: Skin is warm and dry  Capillary refill takes less than 2 seconds  No rash noted  He is not diaphoretic  Psychiatric: He has a normal mood and affect  His behavior is normal    Nursing note and vitals reviewed  ED Medications  Medications   iohexol (OMNIPAQUE) 350 MG/ML injection (MULTI-DOSE) 100 mL (100 mL Intravenous Given 3/28/19 1840)       Diagnostic Studies  Results Reviewed     Procedure Component Value Units Date/Time    Comprehensive metabolic panel [[id number]]  (Abnormal) Collected:  03/28/19 1909    Lab Status:  Final result Specimen:  Blood from Arm, Left Updated:  03/28/19 2020     Sodium 129 mmol/L      Potassium 4 9 mmol/L      Chloride 97 mmol/L      CO2 24 mmol/L      ANION GAP 8 mmol/L      BUN 13 mg/dL      Creatinine 0 91 mg/dL      Glucose 237 mg/dL      Calcium 8 9 mg/dL      AST 59 U/L      ALT 41 U/L      Alkaline Phosphatase 72 U/L      Total Protein 7 5 g/dL      Albumin 3 3 g/dL      Total Bilirubin 0 50 mg/dL      eGFR 110 ml/min/1 73sq m     Narrative:       National Kidney Disease Education Program recommendations are as follows:  GFR calculation is accurate only with a steady state creatinine  Chronic Kidney disease less than 60 ml/min/1 73 sq  meters  Kidney failure less than 15 ml/min/1 73 sq  meters      Lipase [[id number]]  (Normal) Collected:  03/28/19 1909    Lab Status:  Final result Specimen:  Blood from Arm, Left Updated:  03/28/19 2020     Lipase 111 u/L     CBC and differential [[id number]] Collected:  03/28/19 1816    Lab Status:  Final result Specimen:  Blood from Arm, Left Updated:  03/28/19 2008     WBC 9 34 Thousand/uL      RBC 4 74 Million/uL      Hemoglobin 14 5 g/dL      Hematocrit 40 3 %      MCV 85 fL      MCH 30 6 pg      MCHC 35 9 g/dL      RDW 12 6 %      MPV 10 4 fL      Platelets 899 Thousands/uL      nRBC 0 /100 WBCs      Neutrophils Relative 66 %      Immat GRANS % 1 %      Lymphocytes Relative 25 %      Monocytes Relative 6 %      Eosinophils Relative 2 %      Basophils Relative 0 %      Neutrophils Absolute 6 14 Thousands/µL      Immature Grans Absolute 0 06 Thousand/uL      Lymphocytes Absolute 2 37 Thousands/µL      Monocytes Absolute 0 58 Thousand/µL      Eosinophils Absolute 0 15 Thousand/µL      Basophils Absolute 0 04 Thousands/µL     Urine Microscopic [[id number]]  (Abnormal) Collected:  03/28/19 1821    Lab Status:  Final result Specimen:  Urine, Clean Catch Updated:  03/28/19 1835     RBC, UA None Seen /hpf      WBC, UA 4-10 /hpf      Epithelial Cells None Seen /hpf      Bacteria, UA None Seen /hpf      Hyaline Casts, UA 3-5 /lpf     POCT urinalysis dipstick [[id number]]  (Normal) Resulted:  03/28/19 1821    Lab Status:  Final result Specimen:  Urine Updated:  03/28/19 1822     Color, UA -    ED Urine Macroscopic [[id number]]  (Abnormal) Collected:  03/28/19 1821    Lab Status:  Final result Specimen:  Urine Updated:  03/28/19 1821     Color, UA Yellow     Clarity, UA Clear     pH, UA 5 5     Leukocytes, UA Negative     Nitrite, UA Negative     Protein,  (2+) mg/dl      Glucose,  (1/2%) mg/dl      Ketones, UA >=160 (4+) mg/dl      Urobilinogen, UA 0 2 E U /dl      Bilirubin, UA Interference- unable to analyze     Blood, UA Negative     Specific Gravity, UA >=1 030    Narrative:       CLINITEK RESULT                 CT abdomen pelvis with contrast   Final Result by Kaitlin Casarez DO (03/28 1903)      Mild peripancreatic edema/fat stranding adjacent to the pancreatic tail, suggestive of acute pancreatitis  Potential cyst seen along the tail measuring about 14 mm  This can be evaluated with MRI/MRCP abdomen with IV contrast at an appropriate time  Enlarged fatty liver  Short focus of segmental collapse/peristalsis rectosigmoid colon suspected  See comment           Workstation performed: [id number]               Procedures  Procedures      Phone Consults  ED Phone Contact    ED Course  ED Course as of Mar 28 2344   u Mar 28, 2019   1903 Bacteria, UA: None Seen   1904 Leukocytes, UA: Negative   1904 Nitrite, UA: Negative   2041 Patient has pancreatic findings on CT but lipase WNL makes acute pancreatitis unlikely   Lipase: 111                               [facility]  Number of Diagnoses or Management Options  Diagnosis management comments: This is a 59-year-old male with past history of diabetes who presents with abdominal pain times 10 days in the left lower quadrant, mild tenderness palpation left lower quadrant and no peritoneal signs on exam   Otherwise afebrile, stable, no distress  Differential is broad, given patient is diabetic is at risk for atypical presentations of abdominal pathology and so obtain a CT abdomen pelvis with contrast   Will also obtain CBC, CMP, lipase, urine dip  Disposition  Final diagnoses:   Abdominal pain     Time reflects when diagnosis was documented in both MDM as applicable and the Disposition within this note     Time User Action Codes Description Comment    3/28/2019  7:02 PM Alejandra Sherwood Snowball Add [R10 9] Abdominal pain       ED Disposition     ED Disposition Condition Date/Time Comment    Discharge Stable u Mar 28, 2019  7:02 PM Bry Noriega discharge to home/self care              Follow-up Information     Follow up With Specialties Details Why Contact Info Additional Information    Lon Velazquez MD Internal Medicine Schedule an appointment as soon as possible for a visit  Follow up for high blood sugar, pancreatic findings on [phone] [facility] Schedule an appointment as soon as possible for a visit  Follow up for abdominal pain, intestinal findings on  [address] [facility] [address]  [address] [facility] [place], [address],   [address] [place] [address], [id number]          Discharge Medication List as of 3/28/2019  8:58 PM      START taking these medications    Details   dicyclomine (BENTYL) 20 mg tablet Take 1 tablet (20 mg total) by mouth 2 (two) times a day for 5 days, Starting Thu 3/28/2019, Until Tue 4/2/2019, Normal         CONTINUE these medications which have NOT CHANGED    Details   !! metFORMIN (GLUCOPHAGE) 500 mg tablet Take 1 tablet (500 mg total) by mouth 2 (two) times a day with meals for 30 days, Starting Wed 3/27/2019, Until Fri 4/26/2019, Print      !! metFORMIN (GLUCOPHAGE) 500 mg tablet Take by mouth, Historical Med      sitaGLIPtin (JANUVIA) 25 mg tablet Take by mouth, Historical Med       !! - Potential duplicate medications found  Please discuss with provider  No discharge procedures on file  ED Provider  Attending physically available and evaluated Shar Fung  MAGDA managed the patient along with the ED Attending      Electronically Signed by         Adilene Keith MD  03/28/19 7748

## 2019-04-12 ENCOUNTER — OFFICE VISIT (OUTPATIENT)
Dept: FAMILY MEDICINE CLINIC | Facility: CLINIC | Age: 34
End: 2019-04-12
Payer: COMMERCIAL

## 2019-04-12 VITALS
HEART RATE: 92 BPM | OXYGEN SATURATION: 98 % | TEMPERATURE: 98.8 F | HEIGHT: 75 IN | SYSTOLIC BLOOD PRESSURE: 128 MMHG | BODY MASS INDEX: 31.48 KG/M2 | WEIGHT: 253.2 LBS | DIASTOLIC BLOOD PRESSURE: 76 MMHG

## 2019-04-12 DIAGNOSIS — Z13.6 SCREENING FOR CARDIOVASCULAR CONDITION: ICD-10-CM

## 2019-04-12 DIAGNOSIS — E11.9 TYPE 2 DIABETES MELLITUS WITHOUT COMPLICATION, WITH LONG-TERM CURRENT USE OF INSULIN (HCC): Primary | ICD-10-CM

## 2019-04-12 DIAGNOSIS — Z79.4 TYPE 2 DIABETES MELLITUS WITHOUT COMPLICATION, WITH LONG-TERM CURRENT USE OF INSULIN (HCC): Primary | ICD-10-CM

## 2019-04-12 DIAGNOSIS — E66.09 CLASS 1 OBESITY DUE TO EXCESS CALORIES WITHOUT SERIOUS COMORBIDITY WITH BODY MASS INDEX (BMI) OF 31.0 TO 31.9 IN ADULT: ICD-10-CM

## 2019-04-12 PROCEDURE — 99203 OFFICE O/P NEW LOW 30 MIN: CPT | Performed by: FAMILY MEDICINE

## 2019-04-12 PROCEDURE — 3008F BODY MASS INDEX DOCD: CPT | Performed by: FAMILY MEDICINE

## 2019-04-12 RX ORDER — INSULIN DETEMIR 100 [IU]/ML
INJECTION, SOLUTION SUBCUTANEOUS
Refills: 0 | COMMUNITY
Start: 2019-04-11

## 2019-04-12 RX ORDER — INSULIN ASPART 100 [IU]/ML
INJECTION, SOLUTION INTRAVENOUS; SUBCUTANEOUS
Refills: 0 | COMMUNITY
Start: 2019-04-11

## 2019-04-12 RX ORDER — PEN NEEDLE, DIABETIC 32GX 5/32"
NEEDLE, DISPOSABLE MISCELLANEOUS
Refills: 0 | COMMUNITY
Start: 2019-04-11

## 2019-04-12 RX ORDER — ATORVASTATIN CALCIUM 40 MG/1
40 TABLET, FILM COATED ORAL EVERY EVENING
Refills: 0 | COMMUNITY
Start: 2019-04-11

## 2019-04-12 RX ORDER — FENOFIBRATE 200 MG/1
CAPSULE ORAL
Refills: 0 | COMMUNITY
Start: 2019-04-11

## 2019-05-20 PROBLEM — E11.9 TYPE 2 DIABETES MELLITUS WITHOUT COMPLICATION, WITHOUT LONG-TERM CURRENT USE OF INSULIN (HCC): Status: ACTIVE | Noted: 2019-05-20

## 2019-07-31 LAB
CREAT ?TM UR-SCNC: 193 UMOL/L
EXT MICROALBUMIN URINE RANDOM: 1.8
HBA1C MFR BLD HPLC: 8 %
MICROALBUMIN/CREAT UR: 9.3 MG/G{CREAT}

## 2019-09-26 ENCOUNTER — TELEPHONE (OUTPATIENT)
Dept: FAMILY MEDICINE CLINIC | Facility: CLINIC | Age: 34
End: 2019-09-26

## 2020-03-17 ENCOUNTER — OFFICE VISIT (OUTPATIENT)
Dept: URGENT CARE | Facility: CLINIC | Age: 35
End: 2020-03-17
Payer: COMMERCIAL

## 2020-03-17 VITALS
SYSTOLIC BLOOD PRESSURE: 118 MMHG | BODY MASS INDEX: 36.06 KG/M2 | WEIGHT: 290 LBS | OXYGEN SATURATION: 96 % | HEIGHT: 75 IN | TEMPERATURE: 98.8 F | RESPIRATION RATE: 18 BRPM | DIASTOLIC BLOOD PRESSURE: 68 MMHG | HEART RATE: 78 BPM

## 2020-03-17 DIAGNOSIS — Z71.1 WORRIED WELL: Primary | ICD-10-CM

## 2020-03-17 PROCEDURE — 99213 OFFICE O/P EST LOW 20 MIN: CPT | Performed by: PHYSICIAN ASSISTANT

## 2020-03-17 NOTE — PROGRESS NOTES
[address] [employer] [facility]        NAME: Kerby Kehr is a 27 y o  male  : 1985    MRN: [id number]  DATE: 2020  TIME: 12:23 PM    Assessment and Plan   Worried well [Z71 1]  1  Worried well           Patient Instructions     If you start with fevers cough shortness of breath call your family doctor further recommendations  Follow up with PCP in 3-5 days  Proceed to  ER if symptoms worsen  Chief Complaint     Chief Complaint   Patient presents with    Fever     needs note to return to work         History of Present Illness       Patient states he had a low-grade fever for 1 day and work will not allow him back unless he is evaluated the lab to return back to work  Patient denies any runny stuffy nose ear pain sore throat persistent fevers cough or shortness of breath  Patient denies travel for domestic and there is no known exposure to covid-19  Review of Systems   Review of Systems   Constitutional: Negative for chills and fever  HENT: Negative for congestion, ear pain, rhinorrhea and sore throat  Respiratory: Negative for cough, chest tightness, shortness of breath and wheezing  Cardiovascular: Negative for chest pain  Gastrointestinal: Negative for nausea and vomiting  Musculoskeletal: Negative for myalgias  Skin: Negative for rash  Neurological: Negative for headaches  Hematological: Negative for adenopathy  Current Medications       Current Outpatient Medications:     atorvastatin (LIPITOR) 40 mg tablet, Take 40 mg by mouth every evening, Disp: , Rfl: 0    BD PEN NEEDLE MELIA U/F 32G X 4 MM MISC, use four times a day with INSULIN, Disp: , Rfl: 0    glucagon (GLUCAGEN) 1 mg injection, 1 mg once, Disp: , Rfl:     LEVEMIR FLEXTOUCH 100 units/mL injection pen, inject 72 units subcutaneously once daily, Disp: , Rfl: 0    NOVOLOG FLEXPEN 100 units/mL injection pen, inject 20 units subcutaneously three times a day before meals AND     (REFER TO PRESCRIPTION NOTES)  , Disp: , Rfl: 0    dicyclomine (BENTYL) 20 mg tablet, Take 1 tablet (20 mg total) by mouth 2 (two) times a day for 5 days, Disp: 10 tablet, Rfl: 0    fenofibrate micronized (LOFIBRA) 200 MG capsule, take 1 capsule by mouth once daily with BREAKFAST, Disp: , Rfl: 0    metFORMIN (GLUCOPHAGE) 500 mg tablet, Take by mouth, Disp: , Rfl:     metFORMIN (GLUCOPHAGE) 500 mg tablet, Take 1 tablet (500 mg total) by mouth 2 (two) times a day with meals for 30 days, Disp: 60 tablet, Rfl: 0    Current Allergies     Allergies as of 03/17/2020    (No Known Allergies)            The following portions of the patient's history were reviewed and updated as appropriate: allergies, current medications, past family history, past medical history, past social history, past surgical history and problem list      Past Medical History:   Diagnosis Date    Diabetes mellitus ([facility] )        History reviewed  No pertinent surgical history  Family History   Problem Relation Age of Onset    Thyroid disease Mother     Hypertension Father     Hyperlipidemia Father     Diabetes Father     Thyroid disease Father     Heart attack Father          Medications have been verified  Objective   /68   Pulse 78   Temp 98 8 °F (37 1 °C)   Resp 18   Ht 6' 3" (1 905 m)   Wt 132 kg (290 lb)   SpO2 96%   BMI 36 25 kg/m²        Physical Exam     Physical Exam   Constitutional: He is oriented to person, place, and time  He appears well-developed and well-nourished  HENT:   Head: Normocephalic and atraumatic  Right Ear: External ear normal    Left Ear: External ear normal    Nose: Nose normal    Mouth/Throat: Oropharynx is clear and moist    Eyes: Conjunctivae are normal    Neck: Normal range of motion  Neck supple  Cardiovascular: Normal rate, regular rhythm and normal heart sounds  Pulmonary/Chest: Effort normal and breath sounds normal    Lymphadenopathy:     He has no cervical adenopathy     Neurological: He is alert and oriented to person, place, and time  Skin: Skin is warm and dry  Psychiatric: He has a normal mood and affect  His behavior is normal    Nursing note and vitals reviewed

## 2020-06-09 ENCOUNTER — OFFICE VISIT (OUTPATIENT)
Dept: URGENT CARE | Facility: CLINIC | Age: 35
End: 2020-06-09
Payer: COMMERCIAL

## 2020-06-09 VITALS
SYSTOLIC BLOOD PRESSURE: 140 MMHG | DIASTOLIC BLOOD PRESSURE: 80 MMHG | OXYGEN SATURATION: 98 % | RESPIRATION RATE: 20 BRPM | HEIGHT: 75 IN | TEMPERATURE: 98 F | WEIGHT: 283.6 LBS | BODY MASS INDEX: 35.26 KG/M2 | HEART RATE: 100 BPM

## 2020-06-09 DIAGNOSIS — B34.9 VIRAL SYNDROME: Primary | ICD-10-CM

## 2020-06-09 PROCEDURE — 99213 OFFICE O/P EST LOW 20 MIN: CPT | Performed by: PHYSICIAN ASSISTANT

## 2020-06-09 PROCEDURE — U0003 INFECTIOUS AGENT DETECTION BY NUCLEIC ACID (DNA OR RNA); SEVERE ACUTE RESPIRATORY SYNDROME CORONAVIRUS 2 (SARS-COV-2) (CORONAVIRUS DISEASE [COVID-19]), AMPLIFIED PROBE TECHNIQUE, MAKING USE OF HIGH THROUGHPUT TECHNOLOGIES AS DESCRIBED BY CMS-2020-01-R: HCPCS | Performed by: PHYSICIAN ASSISTANT

## 2020-06-10 LAB — SARS-COV-2 RNA SPEC QL NAA+PROBE: NOT DETECTED

## 2020-06-11 ENCOUNTER — TELEPHONE (OUTPATIENT)
Dept: URGENT CARE | Facility: CLINIC | Age: 35
End: 2020-06-11

## 2020-11-09 ENCOUNTER — TELEPHONE (OUTPATIENT)
Dept: FAMILY MEDICINE CLINIC | Facility: CLINIC | Age: 35
End: 2020-11-09

## 2020-11-10 ENCOUNTER — TELEPHONE (OUTPATIENT)
Dept: ADMINISTRATIVE | Facility: OTHER | Age: 35
End: 2020-11-10

## 2021-01-16 ENCOUNTER — OFFICE VISIT (OUTPATIENT)
Dept: URGENT CARE | Facility: CLINIC | Age: 36
End: 2021-01-16
Payer: COMMERCIAL

## 2021-01-16 VITALS
WEIGHT: 283 LBS | HEIGHT: 75 IN | OXYGEN SATURATION: 98 % | HEART RATE: 84 BPM | BODY MASS INDEX: 35.19 KG/M2 | TEMPERATURE: 97.1 F | RESPIRATION RATE: 18 BRPM

## 2021-01-16 DIAGNOSIS — Z20.822 SUSPECTED COVID-19 VIRUS INFECTION: Primary | ICD-10-CM

## 2021-01-16 DIAGNOSIS — R05.9 COUGH: ICD-10-CM

## 2021-01-16 PROCEDURE — 99213 OFFICE O/P EST LOW 20 MIN: CPT | Performed by: NURSE PRACTITIONER

## 2021-01-16 PROCEDURE — U0003 INFECTIOUS AGENT DETECTION BY NUCLEIC ACID (DNA OR RNA); SEVERE ACUTE RESPIRATORY SYNDROME CORONAVIRUS 2 (SARS-COV-2) (CORONAVIRUS DISEASE [COVID-19]), AMPLIFIED PROBE TECHNIQUE, MAKING USE OF HIGH THROUGHPUT TECHNOLOGIES AS DESCRIBED BY CMS-2020-01-R: HCPCS | Performed by: NURSE PRACTITIONER

## 2021-01-16 RX ORDER — ALBUTEROL SULFATE 90 UG/1
2 AEROSOL, METERED RESPIRATORY (INHALATION) EVERY 4 HOURS PRN
Qty: 18 G | Refills: 1 | Status: SHIPPED | OUTPATIENT
Start: 2021-01-16 | End: 2021-02-15

## 2021-01-16 RX ORDER — ROSUVASTATIN CALCIUM 20 MG/1
20 TABLET, COATED ORAL DAILY
COMMUNITY
Start: 2020-11-18

## 2021-01-16 RX ORDER — INSULIN DEGLUDEC 200 U/ML
INJECTION, SOLUTION SUBCUTANEOUS
COMMUNITY
Start: 2020-11-19

## 2021-01-16 RX ORDER — ALBUTEROL SULFATE 90 UG/1
2 AEROSOL, METERED RESPIRATORY (INHALATION) EVERY 4 HOURS PRN
Qty: 18 G | Refills: 1 | Status: SHIPPED | OUTPATIENT
Start: 2021-01-16 | End: 2021-01-16

## 2021-01-16 NOTE — PROGRESS NOTES
[facility]        NAME: Carolyne Acevedo is a 38 y o  male  : 1985    MRN: [id number]  DATE: 2021  TIME: 10:27 AM    Assessment and Plan   Suspected COVID-19 virus infection [Z20 822]  1  Suspected COVID-19 virus infection  albuterol (Ventolin HFA) 90 mcg/act inhaler    DISCONTINUED: albuterol (Ventolin HFA) 90 mcg/act inhaler   2  Cough  Novel Coronavirus (COVID-19), PCR [facility] - Office Collection    albuterol (Ventolin HFA) 90 mcg/act inhaler    DISCONTINUED: albuterol (Ventolin HFA) 90 mcg/act inhaler         Patient Instructions     Patient Instructions     Eat well, drink water, rest when you need it, take a multivitamin, vitamin C 1,000 mg BID, vitamin D 2,000 IU daily  Use over the counter medications to treat symptoms  Use the albuterol inhaler every 4-6 hours as needed for shortness of breath, chest tightness, wheezing or persistent cough  COVID-19 (Coronavirus Disease 2019)   AMBULATORY CARE:   Coronavirus disease 2019 (COVID-19)  is the disease caused by the novel (new) coronavirus first discovered in 2019  Coronaviruses generally cause upper respiratory (nose, throat, and lung) infections, such as a cold  The new virus can also cause serious lower respiratory conditions, such as pneumonia or acute respiratory distress syndrome (ARDS)  Anyone can develop serious problems from the new virus, but your risk is higher if you are 65 or older  A weak immune system, diabetes, or a heart or lung condition can also increase your risk  Signs and symptoms: You may not develop any signs or symptoms  Signs and symptoms that do develop usually start about 5 days after infection but can take 2 to 14 days  Signs and symptoms range from mild to severe  You may feel like you have the flu or a bad cold  Information on COVID-19 is still being learned   Tell your healthcare provider if you think you were infected but develop signs or symptoms not listed below:  · A cough    · Shortness of breath or trouble breathing that may become severe    · A fever of at least 100 4°F, or 38°C (may be lower in adults 65 or older)    · Chills that might include shaking    · Muscle pain, body aches, or a headache    · A sore throat    · Suddenly not being able to taste or smell anything    · Feeling mentally and physically tired (fatigue)    · Congestion (stuffy head and nose), or a runny nose    · Diarrhea, nausea, or vomiting    If you think you or someone you know may be infected:  Do the following to protect others:  · If emergency care is needed,  tell the  about the possible infection, or call ahead and tell the emergency department  · Call a healthcare provider  for instructions if symptoms are mild  Anyone who may be infected should not  arrive without calling first  The provider will need to protect staff members and other patients  · The person who may be infected needs to wear a face covering  while getting medical care  This will help lower the risk of infecting others  Coverings are not used for anyone who is younger than 2 years, has breathing problems, or cannot remove it  The provider can give you instructions for anyone who cannot wear a covering  Call your local emergency number (911 in the [address] [place] [address],[address]) or go to the emergency department if:   · You have trouble breathing or shortness of breath at rest     · You have chest pain or pressure that lasts longer than 5 minutes  · You become confused or hard to wake  · Your lips or face are blue  · You have a fever of 104°F (40°C) or higher  Call your doctor if:   · You do not  have symptoms of COVID-19 but had close physical contact within 14 days with someone who tested positive  · You have questions or concerns about your condition or care  How COVID-19 is diagnosed: If you think you have COVID-19, call your healthcare provider   In some areas, testing is only done if a person has severe symptoms or is hospitalized  Testing is done more widely in other places  Your provider will tell you what to do based on your symptoms and the rules in your area  In general, the following may be used:  · A viral test  shows if you have a current infection  Samples are taken from your nose and throat, usually with swabs  You may need to wait several days to get the test results  Your healthcare provider will tell you how to get your results  You will need to quarantine (stay physically away from others) until you get your results  If results show you have COVID-19, you will need to quarantine until you are well  Your provider or other health official may give you more directions  You will also need to prevent another infection until it is known if you can get COVID-19 again  · An antibody test  shows if you had a past infection  Blood samples are used for this test  Antibodies are made by your immune system to attack the virus that causes COVID-19  Antibodies will form 1 to 3 weeks after you are infected  It is not known if antibodies prevent a second infection, or for how long a person might be protected  If you have antibodies, you will still need to be careful around others until more is known  · CT scans or x-rays  may be used to check for signs of pneumonia  The 2019 coronavirus causes a specific kind of pneumonia, usually in both lungs  Treatment:  No medicine or specific treatment is currently approved for COVID-19  The following may be used to manage your symptoms or treat the effects of COVID-19:  · Mild symptoms  may get better on their own  If you do not need to be treated in a hospital, you will be given instructions to use at home  Your condition will be closely monitored  You will need to watch for worsening symptoms and seek immediate care if needed  Talk to your healthcare provider about the following:    ? Relieve your symptoms    To soothe a sore throat, gargle with warm salt water, or use throat lozenges or a throat spray  Your healthcare provider may recommend a cough medicine  Drink more liquids to thin and loosen mucus and to prevent dehydration  Use decongestants or saline drops as directed for nasal congestion  ? NSAIDs or acetaminophen  can help lower a fever and relieve body aches or a headache  Follow directions  If not taken correctly, NSAIDs can cause kidney damage and acetaminophen can cause liver damage  · Severe or life-threatening symptoms  are treated in the hospital  You may need a combination of the following:    ? Medicines  may be given to reduce inflammation or to fight the virus  You may also need blood thinners to prevent or treat blood clots  If you have a deep vein thrombosis (DVT) or pulmonary embolism (PE), you may need to keep using blood thinners for 3 months  ? Extra oxygen  may be given if you have respiratory failure  This means your lungs cannot get enough oxygen into your blood and out to your organs  Extra oxygen can help prevent organ failure  ? A ventilator  may be used to help you breathe  ? Convalescent plasma (part of blood)  from a patient who has recovered from COVID-19 may be used  The plasma contains antibodies that can help your body fight the infection  Convalescent plasma is only given to patients who have severe signs and symptoms  How the 2019 coronavirus spreads: The virus spreads quickly and easily  You can become infected if you are in contact with a large amount of the virus, even for a short time  You can also become infected by being around a small amount of virus for a long time  The following are ways the virus is thought to spread, but more information may be coming:  · Droplets are the most common way all coronaviruses spread  The virus can travel in droplets that form when a person talks, coughs, or sneezes  Anyone who breathes in the droplets or gets them in his or her eyes can become infected with the virus   Close personal contact with an infected person is thought to be the main way the virus spreads  Close personal contact means you are within 6 feet (2 meters) of the person  · Person-to-person contact can spread the virus  For example, a person with the virus on his or her hands can spread it by shaking hands with someone  At this time, it does not appear that the virus can be passed to a baby during pregnancy or delivery  The baby can be infected after he or she is born through person-to-person contact  The virus also does not appear to spread in breast milk  If you are pregnant or breastfeeding, talk to your healthcare provider or obstetrician about any concerns you have  · The virus can stay on objects and surfaces  A person can get the virus on his or her hands by touching the object or surface  Infection happens if the person then touches his or her eyes or mouth with unwashed hands  It is not yet known how long the virus can stay on an object or surface  That is why it is important to clean all surfaces that are used regularly  · An infected animal may be able to infect a person who touches it  This may happen at live markets or on a farm  How everyone can lower the risk for COVID-19:  The best way to prevent infection is to avoid anyone who is infected, but this can be hard to do  An infected person can spread the virus before signs or symptoms begin, or even if signs or symptoms never develop  The following can help lower the risk for infection:      · Wash your hands often throughout the day  Use soap and water  Rub your soapy hands together, lacing your fingers  Wash the front and back of each hand, and in between your fingers  Use the fingers of one hand to scrub under the fingernails of the other hand  Wash for at least 20 seconds  Rinse with warm, running water for several seconds  Then dry your hands with a clean towel or paper towel   Use hand  that contains alcohol if soap and water are not available  Do not touch your eyes, nose, or mouth without washing your hands first  Teach children how to wash their hands and use hand   · Cover a sneeze or cough  This prevents droplets from traveling from you to others  Turn your face away and cover your mouth and nose with a tissue  Throw the tissue away  Use the bend of your arm if a tissue is not available  Then wash your hands well with soap and water or use hand   Turn and cover your face if you are around someone who is sneezing or coughing  Teach children how to cover a cough or sneeze  · Follow worldwide, national, and local social distancing guidelines  Social distancing means people avoid close physical contact so the virus cannot spread from one person to another  Keep at least 6 feet (2 meters) between you and others  Also keep this distance from anyone who comes to your home, such as someone making a delivery  · Make a habit of not touching your face  It is not known how long the virus can stay on objects and surfaces  If you get the virus on your hands, you can transfer it to your eyes, nose, or mouth and become infected  You can also transfer it to objects, surfaces, or people  Be aware of what you touch when you go out  Examples include handrails and elevator buttons  Try not to touch anything with bare hands unless it is necessary  Wash your hands before you leave your home and when you return  · Clean and disinfect high-touch surfaces and objects often  Use a disinfecting solution or wipes  You can make a solution by diluting 4 teaspoons of bleach in 1 quart (4 cups) of water  Clean and disinfect even if you think no one living in or coming to your home is infected with the virus  You can wipe items with a disinfecting cloth before you bring them into your home  Wash your hands after you handle anything you bring into your home  · Make your immune system as healthy as possible    A weakened immune system makes you more vulnerable to the new coronavirus  No COVID-19 vaccine is available yet  Vaccines such as the flu and pneumonia vaccines can help your immune system  Your healthcare provider can tell you which vaccines to get, and when to get them  Keep your immune system as strong as possible  Do not smoke  Eat healthy foods, exercise regularly, and try to manage stress  Go to bed and wake up at the same times each day  Social distancing guidelines:  National and local social distancing rules vary  Rules may change over time as restrictions are lifted  Restrictions may return if an outbreak happens where you live  It is important to know and follow all current social distancing rules in your area  The following are general guidelines:  · Limit trips out of your home  You may be able to have food, medicines, and other supplies delivered  If possible, have delivered items left at your door or other area  Try not to have someone hand you an item  You will be so close to the person that the virus can spread between you  · Do not have close physical contact with anyone who does not live in your home  Do not shake hands with, hug, or kiss a person as a greeting  Stand or walk as far from others as possible  If you must use public transportation (such as a bus or subway), try to sit or stand away from others  You can stay safely connected with others through phone calls, e-mail messages, social media websites, and video chats  Check in on anyone who may be having a hard time socially distancing, or who lives alone  Ask administrators at nursing homes or long-term care facilities how you can safely communicate with someone living there  · Wear a cloth face covering around others who do not live in your home  Face coverings help prevent the virus from spreading to others in droplets  You can use a clear face covering if someone needs to read your lips   This is a cloth covering that has plastic over the mouth area so your lips can be seen  Do not use coverings that have breathing valves or vents  The virus can travel out of the valve or vent and be spread to others  Do not take your covering off to talk, cough, or sneeze  Do not use coverings on children younger than 2 years or on anyone who has breathing problems or cannot remove it  · Only allow medical or other necessary professionals into your home  Wear your face covering, and remind professionals to wear a face covering  Remind them to wash their hands when they arrive and before they leave  Do not  let anyone who does not live in your home in, even if the person is not sick  A person can pass the virus to others before symptoms of COVID-19 begin  Some people never even develop symptoms  Children commonly have mild symptoms or no symptoms  It may be hard to tell a child not to hug or kiss you  Explain that this is how he or she can help you stay healthy  · Do not go to someone else's home unless it is necessary  Do not go over to visit, even if the person is lonely  Only go if you need to help him or her  Make sure you both wear face coverings while you are there  · Avoid large gatherings and crowds  Gatherings or crowds of 10 or more individuals can cause the virus to spread  Examples of gatherings include parties, sporting events, Faith services, and conferences  Crowds may form at beaches, james, and tourist attractions  Protect yourself by staying away from large gatherings and crowds  · Ask your healthcare provider for other ways to have appointments  You may be able to have appointments without having to go into the provider's office  Some providers offer phone, video, or other types of appointments  You may also be able to get prescriptions for a few months of your medicines at a time  · Stay safe if you must go out to work  You may have a job that can only be done outside your home   Keep physical distance between you and other workers as much as possible  Follow your employer's rules so everyone stays safe  If you have COVID-19 and are recovering at home:  Healthcare providers will give you specific instructions to follow  The following are general guidelines to remind you how to keep others safe until you are well:  · Wash your hands often  Use soap and water as much as possible  You can use hand  that contains alcohol if soap and water are not available  Do not share towels with anyone  If you use paper towels, throw them away in a lined trash can kept in your room or area  Use a covered trash can, if possible  · Do not go out of your home unless it is necessary  You may have to go to your healthcare provider's office for check-ups or to get prescription refills  Do not arrive at the provider's office without an appointment  Providers have to make their offices safe for staff and other patients  · Do not have close physical contact with anyone unless it is necessary  Only have close physical contact with a person giving direct care, or a baby or child you must care for  Family members and friends should not visit you  If possible, stay in a separate area or room of your home if you live with others  No one should go into the area or room except to give you care  You can visit with others by phone, video chat, e-mail, or similar systems  It is important to stay connected with others in your life while you recover  · Wear a face covering while others are near you  This can help prevent droplets from spreading the virus when you talk, sneeze, or cough  Put the covering on before anyone comes into your room or area  Remind the person to cover his or her nose and mouth before going in to provide care for you  · Do not share items  Do not share dishes, towels, or other items with anyone  Items need to be washed after you use them  · Protect your baby    Wash your hands with soap and water often throughout the day  Wear a clean face covering while you breastfeed, or while you express or pump breast milk  If possible, ask someone who is well to care for your baby  You can put breast milk in bottles for the person to use, if needed  Talk to your healthcare provider if you have any questions or concerns about caring for or bonding with your baby  He or she will tell you when to bring your baby in for check-ups and vaccines  He or she will also tell you what to do if you think your baby was infected with the new virus  · Do not handle live animals  Until more is known, it is best not to touch, play with, or handle live animals  Some animals, including pets, have been infected with the new coronavirus  Do not handle or care for animals until you are well  Care includes feeding, petting, and cuddling your pet  Do not let your pet lick you or share your food  Ask someone who is not infected to take care of your pet, if possible  If you must care for a pet, wear a face covering  Wash your hands before and after you give care  · Follow directions from your healthcare provider for being around others after you recover  You will need to wait at least 10 days after symptoms first appeared  Then you will need to have no fever for 24 hours without fever medicine, and no other symptoms  A loss of taste or smell may continue for several months  It is considered okay to be around others if this is your only symptom  It is not known for sure if or for how long a recovered person can pass the virus to others  Your provider may give you instructions, such as continuing social distancing or wearing a face covering around others  How to take care of someone who has COVID-19:  If the person lives in another home, arrange for a time to give care  Remember to bring a few pairs of disposable gloves and a cloth face covering   The following are general guidelines to help you safely care for anyone who has COVID-19:  · Wash your hands often  Wash before and after you go into the person's home, area, or room  Throw paper towels away in a lined trash can that has a lid, if possible  · Do not allow others to go near the person  No one should come into the person's home unless it is necessary  If possible, the person should be in a separate area or room if he or she lives with others  Keep the room's door shut unless you need to go in or out  Have others call, video chat, or e-mail the person if he or she is feeling well enough  The person may feel lonely if he or she is kept separate for a long period of time  Safe communication can help him or her stay connected to family and friends  · Make sure the person's room has good air flow  You may be able to open the window if the weather allows  An air conditioner can also be turned on to help air move  · Contact the person before you go in to give care  Make sure the person is wearing a face covering  Remind him or her to wash his or her hands with soap and water  He or she can use hand  that contains alcohol if soap and water are not available  Put on a face covering before you go in to give care  · Wear gloves while you give care and clean  Clean items the person uses often  Clean countertops, cooking surfaces, and the fronts and insides of the microwave and refrigerator  Clean the shower, toilet, the area around the toilet, the sink, the area around the sink, and faucets  Gather used laundry or bedding  Wash and dry items on the warmest settings the fabric allows  Wash dishes and silverware in hot, soapy water or in a   · Anything you throw away needs to go into a lined trash can  When you need to empty the trash, close the open end of the lining and tie it closed  This helps prevent items the virus is on from spilling out of the trash  Remove your gloves and throw them away  Wash your hands      Follow up with your doctor as directed:  Write down your questions so you remember to ask them during your visits  For more information:   · Centers for Disease Control and Prevention  [address] , [address]  Phone: 5- 989 - 707-1747  Web Address: [employer] com br    © Copyright [address] Information is for End User's use only and may not be sold, redistributed or otherwise used for commercial purposes  All illustrations and images included in CareNotes® are the copyrighted property of [employer] D A M , Inc  or [facility] Nicole Purvis   The above information is an  only  It is not intended as medical advice for individual conditions or treatments  Talk to your doctor, nurse or pharmacist before following any medical regimen to see if it is safe and effective for you  Follow up with PCP in 3-5 days  Proceed to  ER if symptoms worsen  Chief Complaint     Chief Complaint   Patient presents with    Cough     x 2 days    Cold Like Symptoms         History of Present Illness       Patient presents with possible COVID-19 symptoms  He notes that there have been multiple positive cases at his work, at least 6 or 7 over the last 2 or 3 weeks, all people he has a close contact with  Patient began experiencing a mild sore throat and cough about 2 days ago  He also has experience decreased sense of smell  This morning he woke up with some nasal congestion and some chest tightness/congestion  He denies any history of smoking or asthma  He notes that he had bronchitis once as a teenager, but not since  He states that his chest tightness was worse when he got up, that felt like there was heaviness on his chest, but improved after he took a shower  No over-the-counter medications taken  Patient is an insulin-dependent type 2 diabetic  He states that sugars often run higher anyway, but acknowledges that he has not checked it a couple of days    Patient cautioned to keep a close eye on his sugars, that illness typically will make the sugars run high not low, but sometimes if he has any residual pancreatic function, the pancreas will overcorrect a high, so lows are possible too  Patient states his understanding  He lives with a significant other, and they have 6 kids in the household  He states when the kids also has possible symptoms  His significant other and the other 5 kids do not  We discussed isolating within household, isolation guidelines for him presuming he will be positive based on his exposure, and quarantine guidelines for the other household members  Review of Systems   Review of Systems   HENT: Positive for congestion and sore throat (irritation)  Respiratory: Positive for cough and chest tightness  All other systems reviewed and are negative  Current Medications       Current Outpatient Medications:     albuterol (Ventolin HFA) 90 mcg/act inhaler, Inhale 2 puffs every 4 (four) hours as needed for wheezing or shortness of breath, Disp: 18 g, Rfl: 1    atorvastatin (LIPITOR) 40 mg tablet, Take 40 mg by mouth every evening, Disp: , Rfl: 0    BD PEN NEEDLE MELIA U/F 32G X 4 MM MISC, use four times a day with INSULIN, Disp: , Rfl: 0    dicyclomine (BENTYL) 20 mg tablet, Take 1 tablet (20 mg total) by mouth 2 (two) times a day for 5 days, Disp: 10 tablet, Rfl: 0    fenofibrate micronized (LOFIBRA) 200 MG capsule, take 1 capsule by mouth once daily with BREAKFAST, Disp: , Rfl: 0    glucagon (GLUCAGEN) 1 mg injection, 1 mg once, Disp: , Rfl:     LEVEMIR FLEXTOUCH 100 units/mL injection pen, inject 72 units subcutaneously once daily, Disp: , Rfl: 0    metFORMIN (GLUCOPHAGE) 500 mg tablet, Take by mouth, Disp: , Rfl:     NOVOLOG FLEXPEN 100 units/mL injection pen, inject 20 units subcutaneously three times a day before meals AND     (REFER TO PRESCRIPTION NOTES)  , Disp: , Rfl: 0    rosuvastatin (CRESTOR) 20 MG tablet, Take 20 mg by mouth daily, Disp: , Rfl:     Tresiba FlexTouch 200 units/mL CONCENTRATED U-200 injection pen, INJECT 80 UNITS ONCE DAILY, Disp: , Rfl:     Current Allergies     Allergies as of 01/16/2021    (No Known Allergies)            The following portions of the patient's history were reviewed and updated as appropriate: allergies, current medications, past family history, past medical history, past social history, past surgical history and problem list      Past Medical History:   Diagnosis Date    Diabetes mellitus ([address] )        History reviewed  No pertinent surgical history  Family History   Problem Relation Age of Onset    Thyroid disease Mother     Hypertension Father     Hyperlipidemia Father     Diabetes Father     Thyroid disease Father     Heart attack Father          Medications have been verified  Objective   Pulse 84   Temp (!) 97 1 °F (36 2 °C)   Resp 18   Ht 6' 3" (1 905 m)   Wt 128 kg (283 lb)   SpO2 98%   BMI 35 37 kg/m²        Physical Exam     Physical Exam  Vitals signs and nursing note reviewed  Constitutional:       General: He is not in acute distress  Appearance: Normal appearance  He is well-developed  He is ill-appearing  He is not toxic-appearing or diaphoretic  HENT:      Head: Normocephalic and atraumatic  Right Ear: Hearing, tympanic membrane, ear canal and external ear normal       Left Ear: Hearing, tympanic membrane, ear canal and external ear normal       Nose: Mucosal edema and congestion present  No nasal tenderness  Eyes:      Pupils: Pupils are equal, round, and reactive to light  Neck:      Musculoskeletal: Normal range of motion and neck supple  Cardiovascular:      Rate and Rhythm: Normal rate and regular rhythm  Heart sounds: Normal heart sounds  No murmur  No friction rub  No gallop  Pulmonary:      Effort: Pulmonary effort is normal  No respiratory distress  Breath sounds: Normal breath sounds  No stridor  No wheezing, rhonchi or rales  Chest:      Chest wall: No tenderness     Abdominal:      General: Bowel sounds are normal  There is no distension  Palpations: Abdomen is soft  Musculoskeletal: Normal range of motion  Lymphadenopathy:      Cervical: No cervical adenopathy  Skin:     General: Skin is warm and dry  Capillary Refill: Capillary refill takes less than 2 seconds  Neurological:      General: No focal deficit present  Mental Status: He is alert and oriented to person, place, and time  Psychiatric:         Mood and Affect: Mood normal          Behavior: Behavior normal          Thought Content:  Thought content normal          Judgment: Judgment normal

## 2021-01-16 NOTE — PATIENT INSTRUCTIONS
Eat well, drink water, rest when you need it, take a multivitamin, vitamin C 1,000 mg BID, vitamin D 2,000 IU daily  Use over the counter medications to treat symptoms  Use the albuterol inhaler every 4-6 hours as needed for shortness of breath, chest tightness, wheezing or persistent cough  COVID-19 (Coronavirus Disease 2019)   AMBULATORY CARE:   Coronavirus disease 2019 (COVID-19)  is the disease caused by the novel (new) coronavirus first discovered in December 2019  Coronaviruses generally cause upper respiratory (nose, throat, and lung) infections, such as a cold  The new virus can also cause serious lower respiratory conditions, such as pneumonia or acute respiratory distress syndrome (ARDS)  Anyone can develop serious problems from the new virus, but your risk is higher if you are 65 or older  A weak immune system, diabetes, or a heart or lung condition can also increase your risk  Signs and symptoms: You may not develop any signs or symptoms  Signs and symptoms that do develop usually start about 5 days after infection but can take 2 to 14 days  Signs and symptoms range from mild to severe  You may feel like you have the flu or a bad cold  Information on COVID-19 is still being learned   Tell your healthcare provider if you think you were infected but develop signs or symptoms not listed below:  · A cough    · Shortness of breath or trouble breathing that may become severe    · A fever of at least 100 4°F, or 38°C (may be lower in adults 65 or older)    · Chills that might include shaking    · Muscle pain, body aches, or a headache    · A sore throat    · Suddenly not being able to taste or smell anything    · Feeling mentally and physically tired (fatigue)    · Congestion (stuffy head and nose), or a runny nose    · Diarrhea, nausea, or vomiting    If you think you or someone you know may be infected:  Do the following to protect others:  · If emergency care is needed,  tell the  about the possible infection, or call ahead and tell the emergency department  · Call a healthcare provider  for instructions if symptoms are mild  Anyone who may be infected should not  arrive without calling first  The provider will need to protect staff members and other patients  · The person who may be infected needs to wear a face covering  while getting medical care  This will help lower the risk of infecting others  Coverings are not used for anyone who is younger than 2 years, has breathing problems, or cannot remove it  The provider can give you instructions for anyone who cannot wear a covering  Call your local emergency number (911 in the [address] [place] [address],[address]) or go to the emergency department if:   · You have trouble breathing or shortness of breath at rest     · You have chest pain or pressure that lasts longer than 5 minutes  · You become confused or hard to wake  · Your lips or face are blue  · You have a fever of 104°F (40°C) or higher  Call your doctor if:   · You do not  have symptoms of COVID-19 but had close physical contact within 14 days with someone who tested positive  · You have questions or concerns about your condition or care  How COVID-19 is diagnosed: If you think you have COVID-19, call your healthcare provider  In some areas, testing is only done if a person has severe symptoms or is hospitalized  Testing is done more widely in other places  Your provider will tell you what to do based on your symptoms and the rules in your area  In general, the following may be used:  · A viral test  shows if you have a current infection  Samples are taken from your nose and throat, usually with swabs  You may need to wait several days to get the test results  Your healthcare provider will tell you how to get your results  You will need to quarantine (stay physically away from others) until you get your results  If results show you have COVID-19, you will need to quarantine until you are well   Your provider or other health official may give you more directions  You will also need to prevent another infection until it is known if you can get COVID-19 again  · An antibody test  shows if you had a past infection  Blood samples are used for this test  Antibodies are made by your immune system to attack the virus that causes COVID-19  Antibodies will form 1 to 3 weeks after you are infected  It is not known if antibodies prevent a second infection, or for how long a person might be protected  If you have antibodies, you will still need to be careful around others until more is known  · CT scans or x-rays  may be used to check for signs of pneumonia  The 2019 coronavirus causes a specific kind of pneumonia, usually in both lungs  Treatment:  No medicine or specific treatment is currently approved for COVID-19  The following may be used to manage your symptoms or treat the effects of COVID-19:  · Mild symptoms  may get better on their own  If you do not need to be treated in a hospital, you will be given instructions to use at home  Your condition will be closely monitored  You will need to watch for worsening symptoms and seek immediate care if needed  Talk to your healthcare provider about the following:    ? Relieve your symptoms  To soothe a sore throat, gargle with warm salt water, or use throat lozenges or a throat spray  Your healthcare provider may recommend a cough medicine  Drink more liquids to thin and loosen mucus and to prevent dehydration  Use decongestants or saline drops as directed for nasal congestion  ? NSAIDs or acetaminophen  can help lower a fever and relieve body aches or a headache  Follow directions  If not taken correctly, NSAIDs can cause kidney damage and acetaminophen can cause liver damage      · Severe or life-threatening symptoms  are treated in the hospital  You may need a combination of the following:    ? Medicines  may be given to reduce inflammation or to fight the virus  You may also need blood thinners to prevent or treat blood clots  If you have a deep vein thrombosis (DVT) or pulmonary embolism (PE), you may need to keep using blood thinners for 3 months  ? Extra oxygen  may be given if you have respiratory failure  This means your lungs cannot get enough oxygen into your blood and out to your organs  Extra oxygen can help prevent organ failure  ? A ventilator  may be used to help you breathe  ? Convalescent plasma (part of blood)  from a patient who has recovered from COVID-19 may be used  The plasma contains antibodies that can help your body fight the infection  Convalescent plasma is only given to patients who have severe signs and symptoms  How the 2019 coronavirus spreads: The virus spreads quickly and easily  You can become infected if you are in contact with a large amount of the virus, even for a short time  You can also become infected by being around a small amount of virus for a long time  The following are ways the virus is thought to spread, but more information may be coming:  · Droplets are the most common way all coronaviruses spread  The virus can travel in droplets that form when a person talks, coughs, or sneezes  Anyone who breathes in the droplets or gets them in his or her eyes can become infected with the virus  Close personal contact with an infected person is thought to be the main way the virus spreads  Close personal contact means you are within 6 feet (2 meters) of the person  · Person-to-person contact can spread the virus  For example, a person with the virus on his or her hands can spread it by shaking hands with someone  At this time, it does not appear that the virus can be passed to a baby during pregnancy or delivery  The baby can be infected after he or she is born through person-to-person contact  The virus also does not appear to spread in breast milk   If you are pregnant or breastfeeding, talk to your healthcare provider or obstetrician about any concerns you have  · The virus can stay on objects and surfaces  A person can get the virus on his or her hands by touching the object or surface  Infection happens if the person then touches his or her eyes or mouth with unwashed hands  It is not yet known how long the virus can stay on an object or surface  That is why it is important to clean all surfaces that are used regularly  · An infected animal may be able to infect a person who touches it  This may happen at live markets or on a farm  How everyone can lower the risk for COVID-19:  The best way to prevent infection is to avoid anyone who is infected, but this can be hard to do  An infected person can spread the virus before signs or symptoms begin, or even if signs or symptoms never develop  The following can help lower the risk for infection:      · Wash your hands often throughout the day  Use soap and water  Rub your soapy hands together, lacing your fingers  Wash the front and back of each hand, and in between your fingers  Use the fingers of one hand to scrub under the fingernails of the other hand  Wash for at least 20 seconds  Rinse with warm, running water for several seconds  Then dry your hands with a clean towel or paper towel  Use hand  that contains alcohol if soap and water are not available  Do not touch your eyes, nose, or mouth without washing your hands first  Teach children how to wash their hands and use hand   · Cover a sneeze or cough  This prevents droplets from traveling from you to others  Turn your face away and cover your mouth and nose with a tissue  Throw the tissue away  Use the bend of your arm if a tissue is not available  Then wash your hands well with soap and water or use hand   Turn and cover your face if you are around someone who is sneezing or coughing  Teach children how to cover a cough or sneeze      · Follow worldwide, national, and local social distancing guidelines  Social distancing means people avoid close physical contact so the virus cannot spread from one person to another  Keep at least 6 feet (2 meters) between you and others  Also keep this distance from anyone who comes to your home, such as someone making a delivery  · Make a habit of not touching your face  It is not known how long the virus can stay on objects and surfaces  If you get the virus on your hands, you can transfer it to your eyes, nose, or mouth and become infected  You can also transfer it to objects, surfaces, or people  Be aware of what you touch when you go out  Examples include handrails and elevator buttons  Try not to touch anything with bare hands unless it is necessary  Wash your hands before you leave your home and when you return  · Clean and disinfect high-touch surfaces and objects often  Use a disinfecting solution or wipes  You can make a solution by diluting 4 teaspoons of bleach in 1 quart (4 cups) of water  Clean and disinfect even if you think no one living in or coming to your home is infected with the virus  You can wipe items with a disinfecting cloth before you bring them into your home  Wash your hands after you handle anything you bring into your home  · Make your immune system as healthy as possible  A weakened immune system makes you more vulnerable to the new coronavirus  No COVID-19 vaccine is available yet  Vaccines such as the flu and pneumonia vaccines can help your immune system  Your healthcare provider can tell you which vaccines to get, and when to get them  Keep your immune system as strong as possible  Do not smoke  Eat healthy foods, exercise regularly, and try to manage stress  Go to bed and wake up at the same times each day  Social distancing guidelines:  National and local social distancing rules vary  Rules may change over time as restrictions are lifted   Restrictions may return if an outbreak happens where you live  It is important to know and follow all current social distancing rules in your area  The following are general guidelines:  · Limit trips out of your home  You may be able to have food, medicines, and other supplies delivered  If possible, have delivered items left at your door or other area  Try not to have someone hand you an item  You will be so close to the person that the virus can spread between you  · Do not have close physical contact with anyone who does not live in your home  Do not shake hands with, hug, or kiss a person as a greeting  Stand or walk as far from others as possible  If you must use public transportation (such as a bus or subway), try to sit or stand away from others  You can stay safely connected with others through phone calls, e-mail messages, social media websites, and video chats  Check in on anyone who may be having a hard time socially distancing, or who lives alone  Ask administrators at nursing homes or long-term care facilities how you can safely communicate with someone living there  · Wear a cloth face covering around others who do not live in your home  Face coverings help prevent the virus from spreading to others in droplets  You can use a clear face covering if someone needs to read your lips  This is a cloth covering that has plastic over the mouth area so your lips can be seen  Do not use coverings that have breathing valves or vents  The virus can travel out of the valve or vent and be spread to others  Do not take your covering off to talk, cough, or sneeze  Do not use coverings on children younger than 2 years or on anyone who has breathing problems or cannot remove it  · Only allow medical or other necessary professionals into your home  Wear your face covering, and remind professionals to wear a face covering  Remind them to wash their hands when they arrive and before they leave   Do not  let anyone who does not live in your home in, even if the person is not sick  A person can pass the virus to others before symptoms of COVID-19 begin  Some people never even develop symptoms  Children commonly have mild symptoms or no symptoms  It may be hard to tell a child not to hug or kiss you  Explain that this is how he or she can help you stay healthy  · Do not go to someone else's home unless it is necessary  Do not go over to visit, even if the person is lonely  Only go if you need to help him or her  Make sure you both wear face coverings while you are there  · Avoid large gatherings and crowds  Gatherings or crowds of 10 or more individuals can cause the virus to spread  Examples of gatherings include parties, sporting events, Jain services, and conferences  Crowds may form at beaches, james, and tourist attractions  Protect yourself by staying away from large gatherings and crowds  · Ask your healthcare provider for other ways to have appointments  You may be able to have appointments without having to go into the provider's office  Some providers offer phone, video, or other types of appointments  You may also be able to get prescriptions for a few months of your medicines at a time  · Stay safe if you must go out to work  You may have a job that can only be done outside your home  Keep physical distance between you and other workers as much as possible  Follow your employer's rules so everyone stays safe  If you have COVID-19 and are recovering at home:  Healthcare providers will give you specific instructions to follow  The following are general guidelines to remind you how to keep others safe until you are well:  · Wash your hands often  Use soap and water as much as possible  You can use hand  that contains alcohol if soap and water are not available  Do not share towels with anyone  If you use paper towels, throw them away in a lined trash can kept in your room or area  Use a covered trash can, if possible      · Do not go out of your home unless it is necessary  You may have to go to your healthcare provider's office for check-ups or to get prescription refills  Do not arrive at the provider's office without an appointment  Providers have to make their offices safe for staff and other patients  · Do not have close physical contact with anyone unless it is necessary  Only have close physical contact with a person giving direct care, or a baby or child you must care for  Family members and friends should not visit you  If possible, stay in a separate area or room of your home if you live with others  No one should go into the area or room except to give you care  You can visit with others by phone, video chat, e-mail, or similar systems  It is important to stay connected with others in your life while you recover  · Wear a face covering while others are near you  This can help prevent droplets from spreading the virus when you talk, sneeze, or cough  Put the covering on before anyone comes into your room or area  Remind the person to cover his or her nose and mouth before going in to provide care for you  · Do not share items  Do not share dishes, towels, or other items with anyone  Items need to be washed after you use them  · Protect your baby  Wash your hands with soap and water often throughout the day  Wear a clean face covering while you breastfeed, or while you express or pump breast milk  If possible, ask someone who is well to care for your baby  You can put breast milk in bottles for the person to use, if needed  Talk to your healthcare provider if you have any questions or concerns about caring for or bonding with your baby  He or she will tell you when to bring your baby in for check-ups and vaccines  He or she will also tell you what to do if you think your baby was infected with the new virus  · Do not handle live animals    Until more is known, it is best not to touch, play with, or handle live animals  Some animals, including pets, have been infected with the new coronavirus  Do not handle or care for animals until you are well  Care includes feeding, petting, and cuddling your pet  Do not let your pet lick you or share your food  Ask someone who is not infected to take care of your pet, if possible  If you must care for a pet, wear a face covering  Wash your hands before and after you give care  · Follow directions from your healthcare provider for being around others after you recover  You will need to wait at least 10 days after symptoms first appeared  Then you will need to have no fever for 24 hours without fever medicine, and no other symptoms  A loss of taste or smell may continue for several months  It is considered okay to be around others if this is your only symptom  It is not known for sure if or for how long a recovered person can pass the virus to others  Your provider may give you instructions, such as continuing social distancing or wearing a face covering around others  How to take care of someone who has COVID-19:  If the person lives in another home, arrange for a time to give care  Remember to bring a few pairs of disposable gloves and a cloth face covering  The following are general guidelines to help you safely care for anyone who has COVID-19:  · Wash your hands often  Wash before and after you go into the person's home, area, or room  Throw paper towels away in a lined trash can that has a lid, if possible  · Do not allow others to go near the person  No one should come into the person's home unless it is necessary  If possible, the person should be in a separate area or room if he or she lives with others  Keep the room's door shut unless you need to go in or out  Have others call, video chat, or e-mail the person if he or she is feeling well enough  The person may feel lonely if he or she is kept separate for a long period of time   Safe communication can help him or her stay connected to family and friends  · Make sure the person's room has good air flow  You may be able to open the window if the weather allows  An air conditioner can also be turned on to help air move  · Contact the person before you go in to give care  Make sure the person is wearing a face covering  Remind him or her to wash his or her hands with soap and water  He or she can use hand  that contains alcohol if soap and water are not available  Put on a face covering before you go in to give care  · Wear gloves while you give care and clean  Clean items the person uses often  Clean countertops, cooking surfaces, and the fronts and insides of the microwave and refrigerator  Clean the shower, toilet, the area around the toilet, the sink, the area around the sink, and faucets  Gather used laundry or bedding  Wash and dry items on the warmest settings the fabric allows  Wash dishes and silverware in hot, soapy water or in a   · Anything you throw away needs to go into a lined trash can  When you need to empty the trash, close the open end of the lining and tie it closed  This helps prevent items the virus is on from spilling out of the trash  Remove your gloves and throw them away  Wash your hands  Follow up with your doctor as directed:  Write down your questions so you remember to ask them during your visits  For more information:   · Centers for Disease Control and Prevention  [address] , [address]  Phone: 4- 077 - 202-0973  Web Address: [employer] com br    © Copyright [address] [place] [address] Drive Information is for End User's use only and may not be sold, redistributed or otherwise used for commercial purposes  All illustrations and images included in CareNotes® are the copyrighted property of [employer]  or [facility] Nicole Izquierdo  The above information is an  only  It is not intended as medical advice for individual conditions or treatments   Talk to your doctor, nurse or pharmacist before following any medical regimen to see if it is safe and effective for you

## 2021-01-19 LAB — SARS-COV-2 RNA SPEC QL NAA+PROBE: NOT DETECTED

## 2021-04-07 ENCOUNTER — TELEPHONE (OUTPATIENT)
Dept: FAMILY MEDICINE CLINIC | Facility: CLINIC | Age: 36
End: 2021-04-07

## 2021-05-04 ENCOUNTER — TELEPHONE (OUTPATIENT)
Dept: ADMINISTRATIVE | Facility: OTHER | Age: 36
End: 2021-05-04

## 2021-05-04 ENCOUNTER — TELEPHONE (OUTPATIENT)
Dept: FAMILY MEDICINE CLINIC | Facility: CLINIC | Age: 36
End: 2021-05-04

## 2021-05-04 NOTE — TELEPHONE ENCOUNTER
----- Message from Aliyah Brown sent at 5/4/2021 10:28 AM EDT -----  Regarding: foot exam  05/04/21 10:41 AM    Hello, our patient Terry Adamsonist has had Diabetic Foot Exam completed/performed  Please assist in updating the patient chart by pulling the Care Everywhere (CE) document  The date of service is 11/18/20       Thank you,  CHARY Brown PG

## 2021-10-08 ENCOUNTER — OFFICE VISIT (OUTPATIENT)
Dept: URGENT CARE | Facility: CLINIC | Age: 36
End: 2021-10-08
Payer: COMMERCIAL

## 2021-10-08 DIAGNOSIS — B34.9 INFECTION VIRAL: Primary | ICD-10-CM

## 2021-10-08 PROCEDURE — 99213 OFFICE O/P EST LOW 20 MIN: CPT | Performed by: FAMILY MEDICINE

## 2021-10-08 PROCEDURE — U0003 INFECTIOUS AGENT DETECTION BY NUCLEIC ACID (DNA OR RNA); SEVERE ACUTE RESPIRATORY SYNDROME CORONAVIRUS 2 (SARS-COV-2) (CORONAVIRUS DISEASE [COVID-19]), AMPLIFIED PROBE TECHNIQUE, MAKING USE OF HIGH THROUGHPUT TECHNOLOGIES AS DESCRIBED BY CMS-2020-01-R: HCPCS | Performed by: FAMILY MEDICINE

## 2021-10-08 PROCEDURE — U0005 INFEC AGEN DETEC AMPLI PROBE: HCPCS | Performed by: FAMILY MEDICINE

## 2021-10-09 LAB — SARS-COV-2 RNA RESP QL NAA+PROBE: NEGATIVE

## 2021-10-11 ENCOUNTER — NURSE TRIAGE (OUTPATIENT)
Dept: OTHER | Facility: OTHER | Age: 36
End: 2021-10-11

## 2021-10-11 DIAGNOSIS — Z20.828 SARS-ASSOCIATED CORONAVIRUS EXPOSURE: Primary | ICD-10-CM

## 2021-10-11 PROCEDURE — U0005 INFEC AGEN DETEC AMPLI PROBE: HCPCS | Performed by: FAMILY MEDICINE

## 2021-10-11 PROCEDURE — U0003 INFECTIOUS AGENT DETECTION BY NUCLEIC ACID (DNA OR RNA); SEVERE ACUTE RESPIRATORY SYNDROME CORONAVIRUS 2 (SARS-COV-2) (CORONAVIRUS DISEASE [COVID-19]), AMPLIFIED PROBE TECHNIQUE, MAKING USE OF HIGH THROUGHPUT TECHNOLOGIES AS DESCRIBED BY CMS-2020-01-R: HCPCS | Performed by: FAMILY MEDICINE

## 2021-11-05 ENCOUNTER — OFFICE VISIT (OUTPATIENT)
Dept: URGENT CARE | Facility: CLINIC | Age: 36
End: 2021-11-05
Payer: COMMERCIAL

## 2021-11-05 DIAGNOSIS — J06.9 VIRAL UPPER RESPIRATORY TRACT INFECTION WITH COUGH: Primary | ICD-10-CM

## 2021-11-05 PROCEDURE — 99213 OFFICE O/P EST LOW 20 MIN: CPT | Performed by: NURSE PRACTITIONER

## 2021-12-17 ENCOUNTER — OFFICE VISIT (OUTPATIENT)
Dept: URGENT CARE | Facility: CLINIC | Age: 36
End: 2021-12-17
Payer: COMMERCIAL

## 2021-12-17 VITALS
OXYGEN SATURATION: 99 % | WEIGHT: 290 LBS | BODY MASS INDEX: 36.06 KG/M2 | RESPIRATION RATE: 18 BRPM | TEMPERATURE: 97.1 F | HEIGHT: 75 IN | HEART RATE: 96 BPM

## 2021-12-17 DIAGNOSIS — R50.9 FEVER, UNSPECIFIED FEVER CAUSE: ICD-10-CM

## 2021-12-17 DIAGNOSIS — J06.9 ACUTE URI: Primary | ICD-10-CM

## 2021-12-17 PROCEDURE — 87636 SARSCOV2 & INF A&B AMP PRB: CPT | Performed by: PHYSICIAN ASSISTANT

## 2021-12-17 PROCEDURE — 99213 OFFICE O/P EST LOW 20 MIN: CPT | Performed by: PHYSICIAN ASSISTANT

## 2021-12-19 LAB
FLUAV RNA RESP QL NAA+PROBE: NEGATIVE
FLUBV RNA RESP QL NAA+PROBE: NEGATIVE
SARS-COV-2 RNA RESP QL NAA+PROBE: POSITIVE

## 2022-02-09 LAB
LEFT EYE DIABETIC RETINOPATHY: NORMAL
RIGHT EYE DIABETIC RETINOPATHY: NORMAL

## 2022-09-09 ENCOUNTER — HOSPITAL ENCOUNTER (EMERGENCY)
Facility: HOSPITAL | Age: 37
Discharge: HOME | End: 2022-09-10
Attending: EMERGENCY MEDICINE
Payer: COMMERCIAL

## 2022-09-09 ENCOUNTER — APPOINTMENT (OUTPATIENT)
Dept: RADIOLOGY | Facility: HOSPITAL | Age: 37
End: 2022-09-09
Payer: COMMERCIAL

## 2022-09-09 VITALS
BODY MASS INDEX: 37.22 KG/M2 | OXYGEN SATURATION: 97 % | TEMPERATURE: 98.7 F | HEIGHT: 74 IN | HEART RATE: 77 BPM | DIASTOLIC BLOOD PRESSURE: 79 MMHG | WEIGHT: 290 LBS | SYSTOLIC BLOOD PRESSURE: 154 MMHG | RESPIRATION RATE: 21 BRPM

## 2022-09-09 DIAGNOSIS — R07.9 CHEST PAIN: Primary | ICD-10-CM

## 2022-09-09 DIAGNOSIS — E11.9 TYPE 2 DIABETES MELLITUS WITHOUT COMPLICATION, WITHOUT LONG-TERM CURRENT USE OF INSULIN (HCC): ICD-10-CM

## 2022-09-09 DIAGNOSIS — R53.83 FATIGUE: ICD-10-CM

## 2022-09-09 LAB
ALBUMIN SERPL BCP-MCNC: 4.4 G/DL (ref 3.5–5)
ALP SERPL-CCNC: 56 U/L (ref 34–104)
ALT SERPL W P-5'-P-CCNC: 36 U/L (ref 7–52)
ANION GAP SERPL CALCULATED.3IONS-SCNC: 11 MMOL/L (ref 4–13)
AST SERPL W P-5'-P-CCNC: 20 U/L (ref 13–39)
BASOPHILS # BLD AUTO: 0.02 THOUSANDS/ΜL (ref 0–0.1)
BASOPHILS NFR BLD AUTO: 0 % (ref 0–1)
BILIRUB SERPL-MCNC: 0.4 MG/DL (ref 0.2–1)
BUN SERPL-MCNC: 15 MG/DL (ref 5–25)
CALCIUM SERPL-MCNC: 9.4 MG/DL (ref 8.4–10.2)
CARDIAC TROPONIN I PNL SERPL HS: 5 NG/L
CHLORIDE SERPL-SCNC: 102 MMOL/L (ref 96–108)
CO2 SERPL-SCNC: 24 MMOL/L (ref 21–32)
CREAT SERPL-MCNC: 0.84 MG/DL (ref 0.6–1.3)
D DIMER PPP FEU-MCNC: 0.29 UG/ML FEU
EOSINOPHIL # BLD AUTO: 0.12 THOUSAND/ΜL (ref 0–0.61)
EOSINOPHIL NFR BLD AUTO: 2 % (ref 0–6)
ERYTHROCYTE [DISTWIDTH] IN BLOOD BY AUTOMATED COUNT: 12 % (ref 11.6–15.1)
FLUAV RNA RESP QL NAA+PROBE: NEGATIVE
FLUBV RNA RESP QL NAA+PROBE: NEGATIVE
GFR SERPL CREATININE-BSD FRML MDRD: 111 ML/MIN/1.73SQ M
GLUCOSE SERPL-MCNC: 269 MG/DL (ref 65–140)
GLUCOSE SERPL-MCNC: 282 MG/DL (ref 65–140)
HCT VFR BLD AUTO: 38 % (ref 36.5–49.3)
HGB BLD-MCNC: 13 G/DL (ref 12–17)
IMM GRANULOCYTES # BLD AUTO: 0.04 THOUSAND/UL (ref 0–0.2)
IMM GRANULOCYTES NFR BLD AUTO: 1 % (ref 0–2)
LYMPHOCYTES # BLD AUTO: 1.96 THOUSANDS/ΜL (ref 0.6–4.47)
LYMPHOCYTES NFR BLD AUTO: 32 % (ref 14–44)
MCH RBC QN AUTO: 29.9 PG (ref 26.8–34.3)
MCHC RBC AUTO-ENTMCNC: 34.2 G/DL (ref 31.4–37.4)
MCV RBC AUTO: 87 FL (ref 82–98)
MONOCYTES # BLD AUTO: 0.55 THOUSAND/ΜL (ref 0.17–1.22)
MONOCYTES NFR BLD AUTO: 9 % (ref 4–12)
NEUTROPHILS # BLD AUTO: 3.53 THOUSANDS/ΜL (ref 1.85–7.62)
NEUTS SEG NFR BLD AUTO: 56 % (ref 43–75)
NRBC BLD AUTO-RTO: 0 /100 WBCS
PLATELET # BLD AUTO: 197 THOUSANDS/UL (ref 149–390)
PMV BLD AUTO: 10.7 FL (ref 8.9–12.7)
POTASSIUM SERPL-SCNC: 3.9 MMOL/L (ref 3.5–5.3)
PROT SERPL-MCNC: 6.6 G/DL (ref 6.4–8.4)
RBC # BLD AUTO: 4.35 MILLION/UL (ref 3.88–5.62)
RSV RNA RESP QL NAA+PROBE: NEGATIVE
SARS-COV-2 RNA RESP QL NAA+PROBE: NEGATIVE
SODIUM SERPL-SCNC: 137 MMOL/L (ref 135–147)
TSH SERPL DL<=0.05 MIU/L-ACNC: 4.96 UIU/ML (ref 0.45–4.5)
WBC # BLD AUTO: 6.22 THOUSAND/UL (ref 4.31–10.16)

## 2022-09-09 PROCEDURE — 93005 ELECTROCARDIOGRAM TRACING: CPT

## 2022-09-09 PROCEDURE — 96361 HYDRATE IV INFUSION ADD-ON: CPT

## 2022-09-09 PROCEDURE — 99282 EMERGENCY DEPT VISIT SF MDM: CPT

## 2022-09-09 PROCEDURE — 85379 FIBRIN DEGRADATION QUANT: CPT | Performed by: EMERGENCY MEDICINE

## 2022-09-09 PROCEDURE — 0241U HB NFCT DS VIR RESP RNA 4 TRGT: CPT | Performed by: EMERGENCY MEDICINE

## 2022-09-09 PROCEDURE — 84443 ASSAY THYROID STIM HORMONE: CPT | Performed by: EMERGENCY MEDICINE

## 2022-09-09 PROCEDURE — 80053 COMPREHEN METABOLIC PANEL: CPT | Performed by: EMERGENCY MEDICINE

## 2022-09-09 PROCEDURE — 99284 EMERGENCY DEPT VISIT MOD MDM: CPT | Performed by: EMERGENCY MEDICINE

## 2022-09-09 PROCEDURE — 36415 COLL VENOUS BLD VENIPUNCTURE: CPT | Performed by: EMERGENCY MEDICINE

## 2022-09-09 PROCEDURE — 85025 COMPLETE CBC W/AUTO DIFF WBC: CPT | Performed by: EMERGENCY MEDICINE

## 2022-09-09 PROCEDURE — 82948 REAGENT STRIP/BLOOD GLUCOSE: CPT

## 2022-09-09 PROCEDURE — 96360 HYDRATION IV INFUSION INIT: CPT

## 2022-09-09 PROCEDURE — 84484 ASSAY OF TROPONIN QUANT: CPT | Performed by: EMERGENCY MEDICINE

## 2022-09-09 PROCEDURE — 71045 X-RAY EXAM CHEST 1 VIEW: CPT

## 2022-09-09 PROCEDURE — 99285 EMERGENCY DEPT VISIT HI MDM: CPT

## 2022-09-09 RX ADMIN — SODIUM CHLORIDE 1000 ML: 0.9 INJECTION, SOLUTION INTRAVENOUS at 22:20

## 2022-09-10 LAB
2HR DELTA HS TROPONIN: -1 NG/L
ATRIAL RATE: 73 BPM
ATRIAL RATE: 77 BPM
CARDIAC TROPONIN I PNL SERPL HS: 4 NG/L
P AXIS: 62 DEGREES
P AXIS: 64 DEGREES
PR INTERVAL: 166 MS
PR INTERVAL: 168 MS
QRS AXIS: 48 DEGREES
QRS AXIS: 58 DEGREES
QRSD INTERVAL: 92 MS
QRSD INTERVAL: 94 MS
QT INTERVAL: 368 MS
QT INTERVAL: 372 MS
QTC INTERVAL: 405 MS
QTC INTERVAL: 420 MS
T WAVE AXIS: 36 DEGREES
T WAVE AXIS: 37 DEGREES
VENTRICULAR RATE: 73 BPM
VENTRICULAR RATE: 77 BPM

## 2022-09-10 PROCEDURE — 93010 ELECTROCARDIOGRAM REPORT: CPT | Performed by: INTERNAL MEDICINE

## 2022-09-10 PROCEDURE — 96361 HYDRATE IV INFUSION ADD-ON: CPT

## 2022-09-10 NOTE — ED NOTES
Patient verbalized understanding of all DC instructions, all needs met a time of DC  Patient denies any complaints at this time   Provider at the bedside during time of DC to go over all DC instructions        Luly Kendall RN  09/10/22 [id number]

## 2022-09-27 LAB — HBA1C MFR BLD HPLC: 9.1 %

## 2022-11-08 ENCOUNTER — OFFICE VISIT (OUTPATIENT)
Dept: FAMILY MEDICINE CLINIC | Facility: CLINIC | Age: 37
End: 2022-11-08

## 2022-11-08 VITALS
HEART RATE: 78 BPM | DIASTOLIC BLOOD PRESSURE: 84 MMHG | WEIGHT: 276 LBS | OXYGEN SATURATION: 98 % | HEIGHT: 74 IN | TEMPERATURE: 98.4 F | SYSTOLIC BLOOD PRESSURE: 118 MMHG | RESPIRATION RATE: 16 BRPM | BODY MASS INDEX: 35.42 KG/M2

## 2022-11-08 DIAGNOSIS — F33.0 MILD EPISODE OF RECURRENT MAJOR DEPRESSIVE DISORDER (HCC): ICD-10-CM

## 2022-11-08 DIAGNOSIS — R53.83 FATIGUE: ICD-10-CM

## 2022-11-08 DIAGNOSIS — R45.4 ANGER: ICD-10-CM

## 2022-11-08 DIAGNOSIS — Z76.89 ENCOUNTER TO ESTABLISH CARE: ICD-10-CM

## 2022-11-08 DIAGNOSIS — Z11.4 SCREENING FOR HIV (HUMAN IMMUNODEFICIENCY VIRUS): ICD-10-CM

## 2022-11-08 DIAGNOSIS — Z11.59 ENCOUNTER FOR HEPATITIS C SCREENING TEST FOR LOW RISK PATIENT: ICD-10-CM

## 2022-11-08 DIAGNOSIS — Z82.49 FAMILY HISTORY OF PREMATURE CAD: ICD-10-CM

## 2022-11-08 DIAGNOSIS — R21 RASH: ICD-10-CM

## 2022-11-08 DIAGNOSIS — Z72.0 VAPES NICOTINE CONTAINING SUBSTANCE: ICD-10-CM

## 2022-11-08 DIAGNOSIS — R79.89 ELEVATED TSH: ICD-10-CM

## 2022-11-08 DIAGNOSIS — E78.5 HYPERLIPIDEMIA, UNSPECIFIED HYPERLIPIDEMIA TYPE: ICD-10-CM

## 2022-11-08 DIAGNOSIS — R07.9 CHEST PAIN: ICD-10-CM

## 2022-11-08 DIAGNOSIS — E11.9 TYPE 2 DIABETES MELLITUS WITHOUT COMPLICATION, WITHOUT LONG-TERM CURRENT USE OF INSULIN (HCC): Primary | ICD-10-CM

## 2022-11-08 RX ORDER — INSULIN ASPART 100 [IU]/ML
40 INJECTION, SOLUTION INTRAVENOUS; SUBCUTANEOUS 2 TIMES DAILY WITH MEALS
Qty: 15 ML | Refills: 0
Start: 2022-11-08

## 2022-11-08 RX ORDER — INSULIN DEGLUDEC 200 U/ML
120 INJECTION, SOLUTION SUBCUTANEOUS DAILY
Qty: 9 ML | Refills: 0
Start: 2022-11-08

## 2022-11-08 RX ORDER — FLUOXETINE HYDROCHLORIDE 20 MG/1
20 CAPSULE ORAL DAILY
Qty: 30 CAPSULE | Refills: 1 | Status: SHIPPED | OUTPATIENT
Start: 2022-11-08

## 2022-11-08 RX ORDER — PHENTERMINE HYDROCHLORIDE 15 MG/1
15 CAPSULE ORAL EVERY MORNING
COMMUNITY

## 2022-11-08 NOTE — PROGRESS NOTES
Maria Luz [facility] 1985 male MRN: [id number]  [facility] [place]    Visit to Establish Care: Family Medicine    Assessment/Plan     No problem-specific Assessment & Plan notes found for this encounter  Diagnoses and all orders for this visit:    Type 2 diabetes mellitus without complication, without long-term current use of insulin ([address] )  -     Ambulatory Referral to [facility]  -     Thurlow Lefty FlexTouch 200 units/mL CONCENTRATED U-200 injection pen; Inject 120 Units under the skin daily  -     NovoLOG FlexPen 100 units/mL injection pen; Inject 40 Units under the skin 2 (two) times a day with meals  -     metFORMIN (GLUCOPHAGE) 500 mg tablet; Take 2 tablets (1,000 mg total) by mouth 2 (two) times a day with meals  -     CBC and differential; Future  -     Comprehensive metabolic panel; Future    Hyperlipidemia, unspecified hyperlipidemia type  -     Lipid Panel with Direct LDL reflex; Future    BMI 35 0-35 9,adult    Chest pain  -     Ambulatory Referral to Family Practice  -     Ambulatory Referral to Cardiology; Future    Family history of premature CAD  -     Ambulatory Referral to Cardiology; Future    Fatigue  -     Ambulatory Referral to Family Practice    Elevated TSH  -     TSH, 3rd generation with Free T4 reflex; Future    Vapes nicotine containing substance    Mild episode of recurrent major depressive disorder (HCC)  -     FLUoxetine (PROzac) 20 mg capsule; Take 1 capsule (20 mg total) by mouth daily    Anger  -     FLUoxetine (PROzac) 20 mg capsule; Take 1 capsule (20 mg total) by mouth daily    Rash  -     Ambulatory Referral to Dermatology; Future    Encounter for hepatitis C screening test for low risk patient  -     Hepatitis C antibody;  Future    Screening for HIV (human immunodeficiency virus)  -     HIV 1/2 Antigen/Antibody (4th Generation) w Reflex [facility]; Future    Encounter to establish care        In addition to the above, the patient was counseled on general preventative health care subjects, including but not limited to:  - Nutrition, healthy weight, aerobic and weight-bearing exercise  - Mental health, social support, and self care  - Advised of the importance of dental hygiene and routine dental visits   - Patient made aware of  services at the office  SUBJECTIVE    HPI:  Charlie Toscano is a 44 y o  male who presented to establish care with this family medicine practice  Diabetes- Type 2 diabetes, Tresiba U-200 120 units daily, Novolog U-100 40 units TID with sliding scale but usually only eats 2 meals per day, only takes it when he eats, but usually only 35-40 units at a time  Metformin 1,000 mg BID  Very poor diet, admits to eating very large meals, 2 large fast food meals or whole pizza at a time  Follows with endocrinology  Hyperlipidemia- Fenofibrate 200 mg daily and Lipitor 40 mg daily  BMI- 35 44, recently on phentermine, lost 20 pounds with this, last pill 3 days ago, phentermine 15 mg  Prescribed by endocrinology  Chest pain ED visit in Sept  No chest pain exertion, no shortness of breath since then  Family history notable as below for premature heart disease, dad with MI and bypass at 39  TSH noted to be high at ED visit  Mother with thyroid disease, arthritis, father heart attack age 39, bypass history, diabetes  Cancer runs in family, throat cancer, stomach cancer  Vapes, history of smoking  Pre-contemplative quitting  Works as a , busy with 6 children, doesn't feel depressed but some days overwhelming  Stress eating  Irritable, angry  Interested in trying medication to help with this       PHQ-2/9 Depression Screening    Little interest or pleasure in doing things: 3 - nearly every day  Feeling down, depressed, or hopeless: 2 - more than half the days  Trouble falling or staying asleep, or sleeping too much: 2 - more than half the days  Feeling tired or having little energy: 2 - more than half the days  Poor appetite or overeating: 3 - nearly every day  Feeling bad about yourself - or that you are a failure or have let yourself or your family down: 0 - not at all  Trouble concentrating on things, such as reading the newspaper or watching television: 1 - several days  Moving or speaking so slowly that other people could have noticed  Or the opposite - being so fidgety or restless that you have been moving around a lot more than usual: 0 - not at all  Thoughts that you would be better off dead, or of hurting yourself in some way: 0 - not at all  PHQ-2 Score: 5  PHQ-2 Interpretation: POSITIVE depression screen  PHQ-9 Score: 13   PHQ-9 Interpretation: Moderate depression        Rash on hand, bumps that don't heal, picks it off and then grows back, looks like scabs  Since he cut himself months ago  Review of Systems   All other systems reviewed and are negative  Historical Information   Past Medical History:   Diagnosis Date   • Diabetes mellitus ([facility] [address] )      No past surgical history on file    Family History   Problem Relation Age of Onset   • Thyroid disease Mother    • Hypertension Father    • Hyperlipidemia Father    • Diabetes Father    • Thyroid disease Father    • Heart attack Father      Social History     Socioeconomic History   • Marital status: /Civil Union     Spouse name: Not on file   • Number of children: Not on file   • Years of education: Not on file   • Highest education level: Not on file   Occupational History   • Not on file   Tobacco Use   • Smoking status: Former Smoker     Types: E-Cigarettes   • Smokeless tobacco: Never Used   Vaping Use   • Vaping Use: Every day   • Substances: Nicotine, Flavoring   Substance and Sexual Activity   • Alcohol use: Not Currently   • Drug use: Not Currently   • Sexual activity: Yes   Other Topics Concern   • Not on file   Social History Narrative   • Not on file     Social Determinants of Health     Financial Resource Strain: Not on file   Food Insecurity: Not on file   Transportation Needs: Not on file   Physical Activity: Not on file   Stress: Not on file   Social Connections: Not on file   Intimate Partner Violence: Not on file   Housing Stability: Not on file           Medications:      Current Outpatient Medications:   •  BD PEN NEEDLE MELIA U/F 32G X 4 MM MISC, use four times a day with INSULIN, Disp: , Rfl: 0  •  fenofibrate micronized (LOFIBRA) 200 MG capsule, take 1 capsule by mouth once daily with BREAKFAST, Disp: , Rfl: 0  •  FLUoxetine (PROzac) 20 mg capsule, Take 1 capsule (20 mg total) by mouth daily, Disp: 30 capsule, Rfl: 1  •  glucagon (GLUCAGEN) 1 mg injection, 1 mg once, Disp: , Rfl:   •  metFORMIN (GLUCOPHAGE) 500 mg tablet, Take 2 tablets (1,000 mg total) by mouth 2 (two) times a day with meals, Disp: , Rfl:   •  NovoLOG FlexPen 100 units/mL injection pen, Inject 40 Units under the skin 2 (two) times a day with meals, Disp: 15 mL, Rfl: 0  •  phentermine 15 MG capsule, Take 15 mg by mouth every morning, Disp: , Rfl:   •  rosuvastatin (CRESTOR) 20 MG tablet, Take 20 mg by mouth daily, Disp: , Rfl:   •  Tresiba FlexTouch 200 units/mL CONCENTRATED U-200 injection pen, Inject 120 Units under the skin daily, Disp: 9 mL, Rfl: 0      Physical Exam:    Physical Exam  Vitals reviewed  Constitutional:       General: He is not in acute distress  Appearance: Normal appearance  He is not ill-appearing, toxic-appearing or diaphoretic  HENT:      Head: Normocephalic and atraumatic  Eyes:      General:         Right eye: No discharge  Left eye: No discharge  Extraocular Movements: Extraocular movements intact  Conjunctiva/sclera: Conjunctivae normal    Cardiovascular:      Rate and Rhythm: Normal rate and regular rhythm  Heart sounds: Normal heart sounds  No murmur heard  No friction rub  No gallop  Pulmonary:      Effort: Pulmonary effort is normal  No respiratory distress  Breath sounds: Normal breath sounds  No stridor  No wheezing or rhonchi  Musculoskeletal:         General: No swelling, tenderness or signs of injury  Right lower leg: No edema  Left lower leg: No edema  Skin:     General: Skin is warm  Coloration: Skin is not pale  Findings: Rash present  No erythema  Comments: Excoriated papules left hand and forearm   Neurological:      Mental Status: He is alert and oriented to person, place, and time  Motor: No weakness     Psychiatric:         Mood and Affect: Mood normal          Behavior: Behavior normal             Future Appointments   Date Time Provider Berry Anderson   12/16/2022  1:30 PM DO DENNIS [facility]         Ericka Whipple DO  [facility]

## 2022-12-01 DIAGNOSIS — R45.4 ANGER: ICD-10-CM

## 2022-12-01 DIAGNOSIS — F33.0 MILD EPISODE OF RECURRENT MAJOR DEPRESSIVE DISORDER (HCC): ICD-10-CM

## 2022-12-01 RX ORDER — FLUOXETINE HYDROCHLORIDE 20 MG/1
CAPSULE ORAL
Qty: 90 CAPSULE | Refills: 1 | Status: SHIPPED | OUTPATIENT
Start: 2022-12-01

## 2022-12-07 ENCOUNTER — APPOINTMENT (EMERGENCY)
Dept: RADIOLOGY | Facility: HOSPITAL | Age: 37
End: 2022-12-07

## 2022-12-07 ENCOUNTER — HOSPITAL ENCOUNTER (EMERGENCY)
Facility: HOSPITAL | Age: 37
Discharge: HOME | End: 2022-12-07
Attending: EMERGENCY MEDICINE

## 2022-12-07 ENCOUNTER — HOSPITAL ENCOUNTER (OUTPATIENT)
Dept: NON INVASIVE DIAGNOSTICS | Facility: CLINIC | Age: 37
Discharge: HOME | End: 2022-12-07

## 2022-12-07 VITALS
DIASTOLIC BLOOD PRESSURE: 80 MMHG | TEMPERATURE: 97.7 F | WEIGHT: 276 LBS | HEIGHT: 74 IN | OXYGEN SATURATION: 97 % | SYSTOLIC BLOOD PRESSURE: 128 MMHG | BODY MASS INDEX: 35.42 KG/M2 | HEART RATE: 79 BPM | RESPIRATION RATE: 20 BRPM

## 2022-12-07 DIAGNOSIS — R07.89 NON-CARDIAC CHEST PAIN: ICD-10-CM

## 2022-12-07 DIAGNOSIS — R07.89 NON-CARDIAC CHEST PAIN: Primary | ICD-10-CM

## 2022-12-07 LAB
ALBUMIN SERPL BCP-MCNC: 4.6 G/DL (ref 3.5–5)
ALP SERPL-CCNC: 45 U/L (ref 34–104)
ALT SERPL W P-5'-P-CCNC: 38 U/L (ref 7–52)
ANION GAP SERPL CALCULATED.3IONS-SCNC: 6 MMOL/L (ref 4–13)
AST SERPL W P-5'-P-CCNC: 19 U/L (ref 13–39)
BASOPHILS # BLD AUTO: 0.03 THOUSANDS/ÂΜL (ref 0–0.1)
BASOPHILS NFR BLD AUTO: 1 % (ref 0–1)
BILIRUB SERPL-MCNC: 0.29 MG/DL (ref 0.2–1)
BUN SERPL-MCNC: 15 MG/DL (ref 5–25)
CALCIUM SERPL-MCNC: 9.2 MG/DL (ref 8.4–10.2)
CARDIAC TROPONIN I PNL SERPL HS: 3 NG/L
CHLORIDE SERPL-SCNC: 107 MMOL/L (ref 96–108)
CO2 SERPL-SCNC: 25 MMOL/L (ref 21–32)
CREAT SERPL-MCNC: 0.92 MG/DL (ref 0.6–1.3)
EOSINOPHIL # BLD AUTO: 0.13 THOUSAND/ÂΜL (ref 0–0.61)
EOSINOPHIL NFR BLD AUTO: 2 % (ref 0–6)
ERYTHROCYTE [DISTWIDTH] IN BLOOD BY AUTOMATED COUNT: 12.3 % (ref 11.6–15.1)
GFR SERPL CREATININE-BSD FRML MDRD: 105 ML/MIN/1.73SQ M
GLUCOSE SERPL-MCNC: 155 MG/DL (ref 65–140)
HCT VFR BLD AUTO: 38.1 % (ref 36.5–49.3)
HGB BLD-MCNC: 13 G/DL (ref 12–17)
IMM GRANULOCYTES # BLD AUTO: 0.02 THOUSAND/UL (ref 0–0.2)
IMM GRANULOCYTES NFR BLD AUTO: 0 % (ref 0–2)
LYMPHOCYTES # BLD AUTO: 1.85 THOUSANDS/ÂΜL (ref 0.6–4.47)
LYMPHOCYTES NFR BLD AUTO: 34 % (ref 14–44)
MCH RBC QN AUTO: 30.4 PG (ref 26.8–34.3)
MCHC RBC AUTO-ENTMCNC: 34.1 G/DL (ref 31.4–37.4)
MCV RBC AUTO: 89 FL (ref 82–98)
MONOCYTES # BLD AUTO: 0.47 THOUSAND/ÂΜL (ref 0.17–1.22)
MONOCYTES NFR BLD AUTO: 9 % (ref 4–12)
NEUTROPHILS # BLD AUTO: 2.94 THOUSANDS/ÂΜL (ref 1.85–7.62)
NEUTS SEG NFR BLD AUTO: 54 % (ref 43–75)
NRBC BLD AUTO-RTO: 0 /100 WBCS
PLATELET # BLD AUTO: 237 THOUSANDS/UL (ref 149–390)
PMV BLD AUTO: 9.8 FL (ref 8.9–12.7)
POTASSIUM SERPL-SCNC: 3.9 MMOL/L (ref 3.5–5.3)
PROT SERPL-MCNC: 7.2 G/DL (ref 6.4–8.4)
RBC # BLD AUTO: 4.27 MILLION/UL (ref 3.88–5.62)
SODIUM SERPL-SCNC: 138 MMOL/L (ref 135–147)
WBC # BLD AUTO: 5.44 THOUSAND/UL (ref 4.31–10.16)

## 2022-12-07 NOTE — Clinical Note
Jimena Grande was seen and treated in our emergency department on 12/7/2022  Diagnosis:     Kelley Joya  may return to work on return date  He may return on this date: 12/08/2022         If you have any questions or concerns, please don't hesitate to call        Mouna De Souza MD    ______________________________           _______________          _______________  Hospital Representative                              Date                                Time

## 2022-12-08 LAB
ATRIAL RATE: 78 BPM
P AXIS: 52 DEGREES
PR INTERVAL: 170 MS
QRS AXIS: 39 DEGREES
QRSD INTERVAL: 90 MS
QT INTERVAL: 362 MS
QTC INTERVAL: 412 MS
T WAVE AXIS: 29 DEGREES
VENTRICULAR RATE: 78 BPM

## 2022-12-09 ENCOUNTER — APPOINTMENT (OUTPATIENT)
Dept: LAB | Facility: HOSPITAL | Age: 37
End: 2022-12-09

## 2022-12-09 DIAGNOSIS — R79.89 ELEVATED TSH: ICD-10-CM

## 2022-12-09 DIAGNOSIS — E78.5 HYPERLIPIDEMIA, UNSPECIFIED HYPERLIPIDEMIA TYPE: ICD-10-CM

## 2022-12-09 DIAGNOSIS — E11.9 TYPE 2 DIABETES MELLITUS WITHOUT COMPLICATION, WITHOUT LONG-TERM CURRENT USE OF INSULIN (HCC): ICD-10-CM

## 2022-12-09 DIAGNOSIS — Z11.59 ENCOUNTER FOR HEPATITIS C SCREENING TEST FOR LOW RISK PATIENT: ICD-10-CM

## 2022-12-09 DIAGNOSIS — Z11.4 SCREENING FOR HIV (HUMAN IMMUNODEFICIENCY VIRUS): ICD-10-CM

## 2022-12-09 LAB
ALBUMIN SERPL BCP-MCNC: 4.5 G/DL (ref 3.5–5)
ALP SERPL-CCNC: 45 U/L (ref 34–104)
ALT SERPL W P-5'-P-CCNC: 36 U/L (ref 7–52)
ANION GAP SERPL CALCULATED.3IONS-SCNC: 8 MMOL/L (ref 4–13)
AST SERPL W P-5'-P-CCNC: 16 U/L (ref 13–39)
BASOPHILS # BLD AUTO: 0.03 THOUSANDS/ÂΜL (ref 0–0.1)
BASOPHILS NFR BLD AUTO: 1 % (ref 0–1)
BILIRUB SERPL-MCNC: 0.5 MG/DL (ref 0.2–1)
BUN SERPL-MCNC: 15 MG/DL (ref 5–25)
CALCIUM SERPL-MCNC: 9.2 MG/DL (ref 8.4–10.2)
CHLORIDE SERPL-SCNC: 105 MMOL/L (ref 96–108)
CHOLEST SERPL-MCNC: 133 MG/DL
CO2 SERPL-SCNC: 25 MMOL/L (ref 21–32)
CREAT SERPL-MCNC: 0.97 MG/DL (ref 0.6–1.3)
EOSINOPHIL # BLD AUTO: 0.09 THOUSAND/ÂΜL (ref 0–0.61)
EOSINOPHIL NFR BLD AUTO: 2 % (ref 0–6)
ERYTHROCYTE [DISTWIDTH] IN BLOOD BY AUTOMATED COUNT: 12.3 % (ref 11.6–15.1)
GFR SERPL CREATININE-BSD FRML MDRD: 98 ML/MIN/1.73SQ M
GLUCOSE P FAST SERPL-MCNC: 151 MG/DL (ref 65–99)
HCT VFR BLD AUTO: 40.6 % (ref 36.5–49.3)
HCV AB SER QL: NORMAL
HDLC SERPL-MCNC: 27 MG/DL
HGB BLD-MCNC: 13.3 G/DL (ref 12–17)
IMM GRANULOCYTES # BLD AUTO: 0.02 THOUSAND/UL (ref 0–0.2)
IMM GRANULOCYTES NFR BLD AUTO: 0 % (ref 0–2)
LDLC SERPL CALC-MCNC: 78 MG/DL (ref 0–100)
LYMPHOCYTES # BLD AUTO: 1.59 THOUSANDS/ÂΜL (ref 0.6–4.47)
LYMPHOCYTES NFR BLD AUTO: 33 % (ref 14–44)
MCH RBC QN AUTO: 29.4 PG (ref 26.8–34.3)
MCHC RBC AUTO-ENTMCNC: 32.8 G/DL (ref 31.4–37.4)
MCV RBC AUTO: 90 FL (ref 82–98)
MONOCYTES # BLD AUTO: 0.43 THOUSAND/ÂΜL (ref 0.17–1.22)
MONOCYTES NFR BLD AUTO: 9 % (ref 4–12)
NEUTROPHILS # BLD AUTO: 2.64 THOUSANDS/ÂΜL (ref 1.85–7.62)
NEUTS SEG NFR BLD AUTO: 55 % (ref 43–75)
NRBC BLD AUTO-RTO: 0 /100 WBCS
PLATELET # BLD AUTO: 231 THOUSANDS/UL (ref 149–390)
PMV BLD AUTO: 9.8 FL (ref 8.9–12.7)
POTASSIUM SERPL-SCNC: 4.3 MMOL/L (ref 3.5–5.3)
PROT SERPL-MCNC: 7.2 G/DL (ref 6.4–8.4)
RBC # BLD AUTO: 4.52 MILLION/UL (ref 3.88–5.62)
SODIUM SERPL-SCNC: 138 MMOL/L (ref 135–147)
TRIGL SERPL-MCNC: 138 MG/DL
TSH SERPL DL<=0.05 MIU/L-ACNC: 2.52 UIU/ML (ref 0.45–4.5)
WBC # BLD AUTO: 4.8 THOUSAND/UL (ref 4.31–10.16)

## 2022-12-10 LAB — HIV 1+2 AB+HIV1 P24 AG SERPL QL IA: NORMAL

## 2022-12-16 ENCOUNTER — OFFICE VISIT (OUTPATIENT)
Dept: FAMILY MEDICINE CLINIC | Facility: CLINIC | Age: 37
End: 2022-12-16

## 2022-12-16 VITALS
SYSTOLIC BLOOD PRESSURE: 124 MMHG | HEIGHT: 74 IN | RESPIRATION RATE: 18 BRPM | BODY MASS INDEX: 36.06 KG/M2 | TEMPERATURE: 98.7 F | OXYGEN SATURATION: 98 % | DIASTOLIC BLOOD PRESSURE: 76 MMHG | HEART RATE: 73 BPM | WEIGHT: 281 LBS

## 2022-12-16 DIAGNOSIS — E78.5 HYPERLIPIDEMIA, UNSPECIFIED HYPERLIPIDEMIA TYPE: ICD-10-CM

## 2022-12-16 DIAGNOSIS — E11.9 TYPE 2 DIABETES MELLITUS WITHOUT COMPLICATION, WITHOUT LONG-TERM CURRENT USE OF INSULIN (HCC): ICD-10-CM

## 2022-12-16 DIAGNOSIS — E11.65 TYPE 2 DIABETES MELLITUS WITH HYPERGLYCEMIA, WITH LONG-TERM CURRENT USE OF INSULIN (HCC): ICD-10-CM

## 2022-12-16 DIAGNOSIS — R07.9 INTERMITTENT CHEST PAIN: Primary | ICD-10-CM

## 2022-12-16 DIAGNOSIS — Z82.49 FAMILY HISTORY OF PREMATURE CAD: ICD-10-CM

## 2022-12-16 DIAGNOSIS — Z79.4 TYPE 2 DIABETES MELLITUS WITH HYPERGLYCEMIA, WITH LONG-TERM CURRENT USE OF INSULIN (HCC): ICD-10-CM

## 2022-12-16 DIAGNOSIS — R45.4 ANGER: ICD-10-CM

## 2022-12-16 DIAGNOSIS — F33.0 MILD EPISODE OF RECURRENT MAJOR DEPRESSIVE DISORDER (HCC): ICD-10-CM

## 2022-12-16 RX ORDER — FLUOXETINE HYDROCHLORIDE 40 MG/1
40 CAPSULE ORAL DAILY
Qty: 90 CAPSULE | Refills: 1 | Status: SHIPPED | OUTPATIENT
Start: 2022-12-16

## 2022-12-16 NOTE — ASSESSMENT & PLAN NOTE
- Trial increasing Prozac 40 mg daily   - Advised if irritability/anger worsens, call right away and will switch to different SSRI

## 2022-12-16 NOTE — PROGRESS NOTES
Assessment/Plan:       Problem List Items Addressed This Visit        Endocrine    Type 2 diabetes mellitus without complication, without long-term current use of insulin ([address] )       Other    Hyperlipidemia    Family history of premature CAD    Mild episode of recurrent major depressive disorder (HCC)     - Trial increasing Prozac 40 mg daily   - Advised if irritability/anger worsens, call right away and will switch to different SSRI         Relevant Medications    FLUoxetine (PROzac) 40 MG capsule    Anger    Relevant Medications    FLUoxetine (PROzac) 40 MG capsule    Intermittent chest pain - Primary     - Holter monitor results pending  - Stress testing and ECHO ordered  - ED precautions discussed          Relevant Orders    Echo complete w/ contrast if indicated    Stress test only, exercise   Other Visit Diagnoses     Type 2 diabetes mellitus with hyperglycemia, with long-term current use of insulin ([address] )                Subjective:      Patient ID: Liang Nuno is a 44 y o  male  HPI     He had an episode of chest pain last week, went to the ED, work-up unremarkable, walked home from ED for 2 miles felt fine  At the time, felt pain in chest and back, pain in jaw, 1-1 5 hours after eating  Tried to work like that, got very dizzy and almost lost balance  Felt very SOB  Symptoms resolved in ED  No symptoms since  Holter monitor results pending  Depression- Last visit we started Prozac 20 mg daily  Having weird mood swings out of nowhere, episodes of anger  Otherwise noticing it's working  1-2 times per day, more irritable  Over thinks things, on edge, but is feeling better  Doing family counseling with his son       PHQ-2/9 Depression Screening    Little interest or pleasure in doing things: 0 - not at all  Feeling down, depressed, or hopeless: 0 - not at all  Trouble falling or staying asleep, or sleeping too much: 0 - not at all  Feeling tired or having little energy: 0 - not at all  Poor appetite or overeatin - not at all  Feeling bad about yourself - or that you are a failure or have let yourself or your family down: 0 - not at all  Trouble concentrating on things, such as reading the newspaper or watching television: 0 - not at all  Moving or speaking so slowly that other people could have noticed  Or the opposite - being so fidgety or restless that you have been moving around a lot more than usual: 0 - not at all  Thoughts that you would be better off dead, or of hurting yourself in some way: 0 - not at all  PHQ-9 Score: 0   PHQ-9 Interpretation: No or Minimal depression        Diabetes- Following with endocrinology, A1c 9 1 2 months ago  He doesn't check his blood sugar  Hyperlipidemia- LDL 78  The following portions of the patient's history were reviewed and updated as appropriate: allergies, current medications, past family history, past medical history, past social history, past surgical history, and problem list     Review of Systems   All other systems reviewed and are negative  Objective:      /76   Pulse 73   Temp 98 7 °F (37 1 °C) (Tympanic)   Resp 18   Ht 6' 2" (1 88 m)   Wt 127 kg (281 lb)   SpO2 98%   BMI 36 08 kg/m²          Physical Exam  Vitals reviewed  Constitutional:       General: He is not in acute distress  Appearance: Normal appearance  He is not ill-appearing, toxic-appearing or diaphoretic  HENT:      Head: Normocephalic and atraumatic  Eyes:      General:         Right eye: No discharge  Left eye: No discharge  Extraocular Movements: Extraocular movements intact  Conjunctiva/sclera: Conjunctivae normal    Cardiovascular:      Rate and Rhythm: Normal rate and regular rhythm  Heart sounds: Normal heart sounds  No murmur heard  No friction rub  No gallop  Pulmonary:      Effort: Pulmonary effort is normal  No respiratory distress  Breath sounds: Normal breath sounds  No stridor  No wheezing or rhonchi  Musculoskeletal:         General: No swelling, tenderness or signs of injury  Right lower leg: No edema  Left lower leg: No edema  Skin:     General: Skin is warm  Coloration: Skin is not pale  Findings: No erythema or rash  Neurological:      Mental Status: He is alert and oriented to person, place, and time  Motor: No weakness     Psychiatric:         Mood and Affect: Mood normal          Behavior: Behavior normal              [address] [facility]

## 2022-12-20 ENCOUNTER — TELEPHONE (OUTPATIENT)
Dept: ADMINISTRATIVE | Facility: OTHER | Age: 37
End: 2022-12-20

## 2022-12-20 NOTE — TELEPHONE ENCOUNTER
Upon review of the In Basket request and the patient's chart, initial outreach has been made via fax to facility  Please see Contacts section for details       Thank you  Margareth Segura

## 2022-12-20 NOTE — TELEPHONE ENCOUNTER
----- Message from Aliyah Serrano sent at 12/19/2022  1:33 PM EST -----  Regarding: care gap request  12/19/22 1:33 PM    Hello, our patient attached above has had Hemoglobin A1c completed/performed  Please assist in updating the patient chart by pulling the Care Everywhere (CE) document  The date of service is 9/27/22       Thank you,  Domenico Arroyo

## 2022-12-20 NOTE — TELEPHONE ENCOUNTER
----- Message from Aliyah Serrano sent at 12/19/2022  1:35 PM EST -----  Regarding: care gap request  12/19/22 1:35 PM    Hello, our patient attached above has had Diabetic Eye Exam completed/performed  Please assist in updating the patient chart by making an External outreach to [facility] facility located in [place]  The date of service is - patient reports within the last year      Thank you,  Domenico Arroyo

## 2022-12-20 NOTE — TELEPHONE ENCOUNTER
Upon review of the In Basket request we were able to locate, review, and update the patient chart as requested for Hemoglobin A1c  Any additional questions or concerns should be emailed to the Practice Liaisons via the appropriate education email address, please do not reply via In Basket      Thank you  Breonna Savage intact

## 2022-12-20 NOTE — TELEPHONE ENCOUNTER
Upon review of the In Basket request we were able to locate, review, and update the patient chart as requested for Diabetic Eye Exam     Any additional questions or concerns should be emailed to the Practice Liaisons via the appropriate education email address, please do not reply via In Basket      Thank you  Nela Gottlieb

## 2022-12-20 NOTE — LETTER
Diabetic Eye Exam Form    Date Requested: 22  Patient: Steven Arroyo  Patient : 1985   Referring Provider: Hermilo Dc DO      DIABETIC Eye Exam Date _______________________________      Type of Exam MUST be documented for Diabetic Eye Exams  Please CHECK ONE  Retinal Exam       Dilated Retinal Exam       OCT       Optomap-Iris Exam      Fundus Photography       Left Eye - Please check Retinopathy or No Retinopathy        Exam did show retinopathy    Exam did not show retinopathy       Right Eye - Please check Retinopathy or No Retinopathy       Exam did show retinopathy    Exam did not show retinopathy       Comments __________________________________________________________    Practice Providing Exam ______________________________________________    Exam Performed By (print name) _______________________________________      Provider Signature ___________________________________________________      These reports are needed for  compliance  Please fax this completed form and a copy of the Diabetic Eye Exam report to our office located at [place] [address] as soon as possible via [phone] nikita Espinoza Bounds: Phone [phone]  We thank you for your assistance in treating our mutual patient

## 2022-12-24 NOTE — ED PROVIDER NOTES
History  Chief Complaint   Patient presents with   • Chest Pain     Chest pain, back and jaw pain started about an hour ago  Became short of breath moving his tool cart at work  Patient is a 21-year-old male with a history of diabetes mellitus who presents for evaluation of chest pain  Patient says that about 3 hours prior to ED arrival he had the abrupt onset of chest pain  He describes the chest pain is left-sided radiating into his jaw and was back at times  The chest pain is since resolved  He says that the chest pain was nonexertional nonpleuritic or nonpositional nature  Not take anything for the chest pain  He does similar episode a couple months ago which she was evaluated for in the emergency department and was found to have a negative work-up  He denies PE or DVT risk factors and is Wells low risk and PERC negative  No cardiac history per the patient  He does endorse some dyspnea associated with his symptoms initially that is also resolved  He also had an episode of lightheadedness after the chest pain had resolved  Prior to Admission Medications   Prescriptions Last Dose Informant Patient Reported? Taking?    BD PEN NEEDLE MELIA U/F 32G X 4 MM MISC   Yes No   Sig: use four times a day with INSULIN   NovoLOG FlexPen 100 units/mL injection pen   No No   Sig: Inject 40 Units under the skin 2 (two) times a day with meals   Tresiba FlexTouch 200 units/mL CONCENTRATED U-200 injection pen   No No   Sig: Inject 120 Units under the skin daily   fenofibrate micronized (LOFIBRA) 200 MG capsule   Yes No   Sig: take 1 capsule by mouth once daily with BREAKFAST   glucagon (GLUCAGEN) 1 mg injection   Yes No   Si mg once   metFORMIN (GLUCOPHAGE) 500 mg tablet   No No   Sig: Take 2 tablets (1,000 mg total) by mouth 2 (two) times a day with meals   phentermine 15 MG capsule   Yes No   Sig: Take 15 mg by mouth every morning   rosuvastatin (CRESTOR) 20 MG tablet   Yes No   Sig: Take 20 mg by mouth daily      Facility-Administered Medications: None       Past Medical History:   Diagnosis Date   • Diabetes mellitus        History reviewed  No pertinent surgical history  Family History   Problem Relation Age of Onset   • Thyroid disease Mother    • Hypertension Father    • Hyperlipidemia Father    • Diabetes Father    • Thyroid disease Father    • Heart attack Father      I have reviewed and agree with the history as documented  E-Cigarette/Vaping   • E-Cigarette Use Current Every Day User      E-Cigarette/Vaping Substances   • Nicotine Yes    • THC No    • CBD No    • Flavoring Yes    • Other No    • Unknown No      Social History     Tobacco Use   • Smoking status: Former     Types: E-Cigarettes   • Smokeless tobacco: Never   Vaping Use   • Vaping Use: Every day   • Substances: Nicotine, Flavoring   Substance Use Topics   • Alcohol use: Not Currently   • Drug use: Not Currently       Review of Systems   Constitutional: Negative for fever  HENT: Negative for sore throat  Eyes: Negative for photophobia  Respiratory: Positive for shortness of breath  Cardiovascular: Positive for chest pain  Gastrointestinal: Negative for abdominal pain  Genitourinary: Negative for dysuria  Musculoskeletal: Negative for back pain  Skin: Negative for rash  Neurological: Positive for light-headedness  Hematological: Negative for adenopathy  Psychiatric/Behavioral: Negative for agitation  All other systems reviewed and are negative  Physical Exam  Physical Exam  Vitals reviewed  Constitutional:       General: He is not in acute distress  Appearance: He is well-developed  HENT:      Head: Normocephalic  Eyes:      Pupils: Pupils are equal, round, and reactive to light  Cardiovascular:      Rate and Rhythm: Normal rate and regular rhythm  Heart sounds: Normal heart sounds  No murmur heard  No friction rub  No gallop     Pulmonary:      Effort: Pulmonary effort is normal  Breath sounds: Normal breath sounds  Abdominal:      General: Bowel sounds are normal  There is no distension  Palpations: Abdomen is soft  Tenderness: There is no abdominal tenderness  There is no guarding  Musculoskeletal:         General: Normal range of motion  Cervical back: Normal range of motion and neck supple  Skin:     Capillary Refill: Capillary refill takes less than 2 seconds  Neurological:      Mental Status: He is alert and oriented to person, place, and time  Cranial Nerves: No cranial nerve deficit  Sensory: No sensory deficit  Motor: No abnormal muscle tone  Psychiatric:         Behavior: Behavior normal          Thought Content:  Thought content normal          Judgment: Judgment normal          Vital Signs  ED Triage Vitals   Temperature Pulse Respirations Blood Pressure SpO2   12/07/22 1124 12/07/22 1012 12/07/22 1012 12/07/22 1012 12/07/22 1012   97 7 °F (36 5 °C) 86 18 150/82 98 %      Temp Source Heart Rate Source Patient Position - Orthostatic VS BP Location FiO2 (%)   12/07/22 1124 12/07/22 1012 12/07/22 1012 12/07/22 1012 --   Oral Monitor Lying Left arm       Pain Score       12/07/22 1012       No Pain           Vitals:    12/07/22 1012 12/07/22 1124   BP: 150/82 128/80   Pulse: 86 79   Patient Position - Orthostatic VS: Lying Lying         Visual Acuity      ED Medications  Medications - No data to display    Diagnostic Studies  Results Reviewed     Procedure Component Value Units Date/Time    HS Troponin 0hr (reflex protocol) [[id number]]  (Normal) Collected: 12/07/22 1027    Lab Status: Final result Specimen: Blood from Arm, Right Updated: 12/07/22 1056     hs TnI 0hr 3 ng/L     Comprehensive metabolic panel [[id number]]  (Abnormal) Collected: 12/07/22 1027    Lab Status: Final result Specimen: Blood from Arm, Right Updated: 12/07/22 1050     Sodium 138 mmol/L      Potassium 3 9 mmol/L      Chloride 107 mmol/L      CO2 25 mmol/L      ANION GAP 6 mmol/L      BUN 15 mg/dL      Creatinine 0 92 mg/dL      Glucose 155 mg/dL      Calcium 9 2 mg/dL      AST 19 U/L      ALT 38 U/L      Alkaline Phosphatase 45 U/L      Total Protein 7 2 g/dL      Albumin 4 6 g/dL      Total Bilirubin 0 29 mg/dL      eGFR 105 ml/min/1 73sq m     Narrative:      [place] guidelines for Chronic Kidney Disease (CKD):   •  Stage 1 with normal or high GFR (GFR > 90 mL/min/1 73 square meters)  •  Stage 2 Mild CKD (GFR = 60-89 mL/min/1 73 square meters)  •  Stage 3A Moderate CKD (GFR = 45-59 mL/min/1 73 square meters)  •  Stage 3B Moderate CKD (GFR = 30-44 mL/min/1 73 square meters)  •  Stage 4 Severe CKD (GFR = 15-29 mL/min/1 73 square meters)  •  Stage 5 End Stage CKD (GFR <15 mL/min/1 73 square meters)  Note: GFR calculation is accurate only with a steady state creatinine    CBC and differential [[id number]] Collected: 12/07/22 1027    Lab Status: Final result Specimen: Blood from Arm, Right Updated: 12/07/22 1036     WBC 5 44 Thousand/uL      RBC 4 27 Million/uL      Hemoglobin 13 0 g/dL      Hematocrit 38 1 %      MCV 89 fL      MCH 30 4 pg      MCHC 34 1 g/dL      RDW 12 3 %      MPV 9 8 fL      Platelets 756 Thousands/uL      nRBC 0 /100 WBCs      Neutrophils Relative 54 %      Immat GRANS % 0 %      Lymphocytes Relative 34 %      Monocytes Relative 9 %      Eosinophils Relative 2 %      Basophils Relative 1 %      Neutrophils Absolute 2 94 Thousands/µL      Immature Grans Absolute 0 02 Thousand/uL      Lymphocytes Absolute 1 85 Thousands/µL      Monocytes Absolute 0 47 Thousand/µL      Eosinophils Absolute 0 13 Thousand/µL      Basophils Absolute 0 03 Thousands/µL                  XR chest 1 view portable   ED Interpretation by Flo Schaefer MD (12/07 1055)   ED wet read: No acute cardiopulmonary process noted      Final Result by Keke Gomes MD (12/07 1057)      No acute cardiopulmonary disease                    Workstation performed: [id number]                    Procedures  ECG 12 Lead Documentation Only    Date/Time: 12/7/2022 8:23 PM  Performed by: Renu Chavez MD  Authorized by: Renu Chavez MD     ECG reviewed by me, the ED Provider: yes    Patient location:  ED  Previous ECG:     Previous ECG:  Unavailable    Comparison to cardiac monitor: Yes    Interpretation:     Interpretation: non-specific    Rate:     ECG rate assessment: normal    Rhythm:     Rhythm: sinus rhythm    Ectopy:     Ectopy: none    QRS:     QRS axis:  Normal    QRS intervals:  Normal  Conduction:     Conduction: normal    ST segments:     ST segments:  Normal  T waves:     T waves: flattening and inverted      Flattening:  V4, V6, V5 and aVF    Inverted:  III             ED Course                                             MDM  Number of Diagnoses or Management Options  Non-cardiac chest pain  Diagnosis management comments: Patient is a 62-year-old male presents for evaluation of chest pain  Asymptomatic while he was here  Troponin negative and chest x-ray unremarkable  Patient advised to follow-up with Holter monitor for possible arrhythmia and follow-up with cardiology with strict return precautions  Disposition  Final diagnoses:   Non-cardiac chest pain     Time reflects when diagnosis was documented in both MDM as applicable and the Disposition within this note     Time User Action Codes Description Comment    12/7/2022 11:23 AM Estelita Liao Add [R07 89] Non-cardiac chest pain       ED Disposition     ED Disposition   Discharge    Condition   Stable    Date/Time   Wed Dec 7, 2022 11:23 AM    Comment   Samira Hicks discharge to home/self care                 Follow-up Information     Follow up With Specialties Details Why [address] Emergency Department Emergency Medicine  If symptoms worsen [address] [place] [id number]  [facility], [address] [place] [address] Trevor Darling, [address] [facility]    Aaliyah Conde MD Cardiology Schedule an appointment as soon as possible for a visit   [facility]  [address] [facility]             Discharge Medication List as of 12/7/2022 11:24 AM      CONTINUE these medications which have NOT CHANGED    Details   BD PEN NEEDLE MELIA U/F 32G X 4 MM MISC use four times a day with INSULIN, Historical Med      fenofibrate micronized (LOFIBRA) 200 MG capsule take 1 capsule by mouth once daily with BREAKFAST, Historical Med      glucagon (GLUCAGEN) 1 mg injection 1 mg once, Historical Med      metFORMIN (GLUCOPHAGE) 500 mg tablet Take 2 tablets (1,000 mg total) by mouth 2 (two) times a day with meals, Starting Tue 11/8/2022, No Print      NovoLOG FlexPen 100 units/mL injection pen Inject 40 Units under the skin 2 (two) times a day with meals, Starting Tue 11/8/2022, No Print      phentermine 15 MG capsule Take 15 mg by mouth every morning, Historical Med      rosuvastatin (CRESTOR) 20 MG tablet Take 20 mg by mouth daily, Starting Wed 11/18/2020, Historical Med      Tresiba FlexTouch 200 units/mL CONCENTRATED U-200 injection pen Inject 120 Units under the skin daily, Starting Tue 11/8/2022, No Print      FLUoxetine (PROzac) 20 mg capsule TAKE 1 CAPSULE BY MOUTH EVERY DAY, Normal             Outpatient Discharge Orders   Ambulatory Referral to Cardiology   Standing Status: Future Standing Exp  Date: 12/07/23      Holter monitor   Standing Status: Future Number of Occurrences: 1 Standing Exp   Date: 12/07/26       PDMP Review     None          ED Provider  Electronically Signed by           Barbara Moore MD  12/23/22 2024

## 2023-01-10 ENCOUNTER — HOSPITAL ENCOUNTER (OUTPATIENT)
Dept: NON INVASIVE DIAGNOSTICS | Facility: HOSPITAL | Age: 38
Discharge: HOME | End: 2023-01-10

## 2023-01-10 VITALS
WEIGHT: 279.98 LBS | SYSTOLIC BLOOD PRESSURE: 128 MMHG | BODY MASS INDEX: 35.93 KG/M2 | HEART RATE: 91 BPM | HEIGHT: 74 IN | DIASTOLIC BLOOD PRESSURE: 72 MMHG

## 2023-01-10 VITALS
SYSTOLIC BLOOD PRESSURE: 120 MMHG | BODY MASS INDEX: 36.06 KG/M2 | HEART RATE: 86 BPM | DIASTOLIC BLOOD PRESSURE: 78 MMHG | WEIGHT: 281 LBS | HEIGHT: 74 IN

## 2023-01-10 DIAGNOSIS — R07.9 INTERMITTENT CHEST PAIN: ICD-10-CM

## 2023-01-10 LAB
AORTIC ROOT: 3.6 CM
E WAVE DECELERATION TIME: 256 MS
FRACTIONAL SHORTENING: 29 % (ref 28–44)
INTERVENTRICULAR SEPTUM IN DIASTOLE (PARASTERNAL SHORT AXIS VIEW): 0.9 CM
INTERVENTRICULAR SEPTUM: 0.9 CM (ref 0.6–1.1)
LAAS-AP2: 19.6 CM2
LAAS-AP4: 21.3 CM2
LEFT ATRIUM SIZE: 3.9 CM
LEFT INTERNAL DIMENSION IN SYSTOLE: 3.5 CM (ref 2.1–4)
LEFT VENTRICULAR INTERNAL DIMENSION IN DIASTOLE: 4.9 CM (ref 3.5–6)
LEFT VENTRICULAR POSTERIOR WALL IN END DIASTOLE: 0.9 CM
LEFT VENTRICULAR STROKE VOLUME: 63 ML
LVSV (TEICH): 63 ML
MAX HR PERCENT: 86 %
MAX HR: 160 BPM
MV E'TISSUE VEL-LAT: 17 CM/S
MV E'TISSUE VEL-SEP: 12 CM/S
MV PEAK A VEL: 0.87 M/S
MV PEAK E VEL: 75 CM/S
MV STENOSIS PRESSURE HALF TIME: 74 MS
MV VALVE AREA P 1/2 METHOD: 2.97 CM2
RA PRESSURE ESTIMATED: 5 MMHG
RATE PRESSURE PRODUCT: NORMAL
RIGHT ATRIUM AREA SYSTOLE A4C: 18.1 CM2
RIGHT VENTRICLE ID DIMENSION: 3.2 CM
SL CV LEFT ATRIUM LENGTH A2C: 5.4 CM
SL CV LV EF: 60
SL CV PED ECHO LEFT VENTRICLE DIASTOLIC VOLUME (MOD BIPLANE) 2D: 112 ML
SL CV PED ECHO LEFT VENTRICLE SYSTOLIC VOLUME (MOD BIPLANE) 2D: 49 ML
SL CV STRESS RECOVERY BP: NORMAL MMHG
SL CV STRESS RECOVERY HR: 100 BPM
SL CV STRESS RECOVERY O2 SAT: 98 %
SL CV STRESS STAGE REACHED: 4
STRESS ANGINA INDEX: 0
STRESS BASELINE BP: NORMAL MMHG
STRESS BASELINE HR: 86 BPM
STRESS DUKE TREADMILL SCORE: 9
STRESS O2 SAT REST: 97 %
STRESS PEAK HR: 160 BPM
STRESS POST ESTIMATED WORKLOAD: 10.6 METS
STRESS POST EXERCISE DUR MIN: 9 MIN
STRESS POST EXERCISE DUR SEC: 21 SEC
STRESS POST O2 SAT PEAK: 98 %
STRESS POST PEAK BP: 166 MMHG
STRESS ST DEPRESSION: 0 MM
TR PEAK VELOCITY: 0.9 M/S
TRICUSPID VALVE PEAK E WAVE VELOCITY: 0.17 M/S
TRICUSPID VALVE PEAK REGURGITATION VELOCITY: 0.93 M/S

## 2023-01-11 LAB
CHEST PAIN STATEMENT: NORMAL
MAX DIASTOLIC BP: 74 MMHG
MAX HEART RATE: 160 BPM
MAX PREDICTED HEART RATE: 182 BPM
MAX. SYSTOLIC BP: 166 MMHG
PROTOCOL NAME: NORMAL
REASON FOR TERMINATION: NORMAL
TARGET HR FORMULA: NORMAL
TEST INDICATION: NORMAL
TIME IN EXERCISE PHASE: NORMAL

## 2023-01-18 ENCOUNTER — OFFICE VISIT (OUTPATIENT)
Dept: FAMILY MEDICINE CLINIC | Facility: CLINIC | Age: 38
End: 2023-01-18

## 2023-01-18 VITALS
HEIGHT: 74 IN | HEART RATE: 76 BPM | SYSTOLIC BLOOD PRESSURE: 124 MMHG | TEMPERATURE: 98.4 F | BODY MASS INDEX: 36.83 KG/M2 | WEIGHT: 287 LBS | OXYGEN SATURATION: 98 % | DIASTOLIC BLOOD PRESSURE: 84 MMHG

## 2023-01-18 DIAGNOSIS — R45.4 ANGER: ICD-10-CM

## 2023-01-18 DIAGNOSIS — I10 PRIMARY HYPERTENSION: ICD-10-CM

## 2023-01-18 DIAGNOSIS — F33.0 MILD EPISODE OF RECURRENT MAJOR DEPRESSIVE DISORDER (HCC): ICD-10-CM

## 2023-01-18 DIAGNOSIS — K21.9 GASTROESOPHAGEAL REFLUX DISEASE, UNSPECIFIED WHETHER ESOPHAGITIS PRESENT: ICD-10-CM

## 2023-01-18 DIAGNOSIS — E11.9 TYPE 2 DIABETES MELLITUS WITHOUT COMPLICATION, WITHOUT LONG-TERM CURRENT USE OF INSULIN (HCC): Primary | ICD-10-CM

## 2023-01-18 RX ORDER — AMLODIPINE BESYLATE 5 MG/1
5 TABLET ORAL DAILY
Qty: 90 TABLET | Refills: 1 | Status: SHIPPED | OUTPATIENT
Start: 2023-01-18

## 2023-01-18 NOTE — ASSESSMENT & PLAN NOTE
- Blood pressure borderline today, and on review of recent encounters, ranging 130-140/80-90  - Given changes on ECHO, recommended antihypertensive  - Amlodipine 5 mg daily  - Recommended home monitoring

## 2023-01-18 NOTE — PROGRESS NOTES
Assessment/Plan:       Problem List Items Addressed This Visit        Digestive    Gastroesophageal reflux disease     - Recommended continue Pepcid PRN, referral for GI consult          Relevant Orders    Ambulatory Referral to Gastroenterology       Endocrine    Type 2 diabetes mellitus without complication, without long-term current use of insulin ([facility] [address] ) - Primary       Cardiovascular and Mediastinum    Primary hypertension     - Blood pressure borderline today, and on review of recent encounters, ranging 130-140/80-90  - Given changes on ECHO, recommended antihypertensive  - Amlodipine 5 mg daily  - Recommended home monitoring          Relevant Medications    amLODIPine (NORVASC) 5 mg tablet       Other    Mild episode of recurrent major depressive disorder (HCC)    Anger         Start Prozac 40 mg daily  Close follow-up in 4-6 weeks  Subjective:      Patient ID: Larisa Girard is a 44 y o  male  HPI     Following with endocrinology, A1c has significantly improved  Back on phentermine for weight loss  Depression and irritability- Feeling about the same as last visit, Prozac 40 mg daily, hasn't started new dose yet  PHQ-2/9 Depression Screening    Little interest or pleasure in doing things: 3 - nearly every day  Feeling down, depressed, or hopeless: 3 - nearly every day  Trouble falling or staying asleep, or sleeping too much: 1 - several days  Feeling tired or having little energy: 3 - nearly every day  Poor appetite or overeating: 3 - nearly every day  Feeling bad about yourself - or that you are a failure or have let yourself or your family down: 0 - not at all  Trouble concentrating on things, such as reading the newspaper or watching television: 1 - several days  Moving or speaking so slowly that other people could have noticed   Or the opposite - being so fidgety or restless that you have been moving around a lot more than usual: 0 - not at all  Thoughts that you would be better off dead, or of hurting yourself in some way: 0 - not at all  PHQ-9 Score: 14   PHQ-9 Interpretation: Moderate depression        Stress testing normal  ECHO reviewed, left and right atrium mildly dilated, otherwise normal  He does note he has been told his blood pressure is elevated, and as far as he can remember, his diastolic has always been above 80  Yesterday felt burning feeling in throat, Pepcid took symptoms away  Episodes of chest and back pain in the past around eating, hasn't happened since last ED visit  Reflux started in 20s was more severe  The following portions of the patient's history were reviewed and updated as appropriate: allergies, current medications, past family history, past medical history, past social history, past surgical history, and problem list     Review of Systems   All other systems reviewed and are negative  Objective:      /84   Pulse 76   Temp 98 4 °F (36 9 °C)   Ht 6' 2" (1 88 m)   Wt 130 kg (287 lb)   SpO2 98%   BMI 36 85 kg/m²          Physical Exam  Vitals reviewed  Constitutional:       General: He is not in acute distress  Appearance: Normal appearance  He is not ill-appearing, toxic-appearing or diaphoretic  HENT:      Head: Normocephalic and atraumatic  Eyes:      General:         Right eye: No discharge  Left eye: No discharge  Extraocular Movements: Extraocular movements intact  Conjunctiva/sclera: Conjunctivae normal    Cardiovascular:      Rate and Rhythm: Normal rate and regular rhythm  Heart sounds: Normal heart sounds  No murmur heard  No friction rub  No gallop  Pulmonary:      Effort: Pulmonary effort is normal  No respiratory distress  Breath sounds: Normal breath sounds  No stridor  No wheezing or rhonchi  Musculoskeletal:         General: No swelling, tenderness or signs of injury  Skin:     General: Skin is warm  Coloration: Skin is not pale  Findings: No erythema or rash     Neurological: Mental Status: He is alert and oriented to person, place, and time  Motor: No weakness     Psychiatric:         Mood and Affect: Mood normal          Behavior: Behavior normal              DO Dionna Hughes [address] [place] [address] [facility]

## 2023-02-24 ENCOUNTER — OFFICE VISIT (OUTPATIENT)
Dept: FAMILY MEDICINE CLINIC | Facility: CLINIC | Age: 38
End: 2023-02-24

## 2023-02-24 VITALS
HEART RATE: 78 BPM | RESPIRATION RATE: 18 BRPM | OXYGEN SATURATION: 99 % | HEIGHT: 74 IN | TEMPERATURE: 98.1 F | BODY MASS INDEX: 36.83 KG/M2 | WEIGHT: 287 LBS | SYSTOLIC BLOOD PRESSURE: 128 MMHG | DIASTOLIC BLOOD PRESSURE: 72 MMHG

## 2023-02-24 DIAGNOSIS — R45.4 ANGER: ICD-10-CM

## 2023-02-24 DIAGNOSIS — F33.0 MILD EPISODE OF RECURRENT MAJOR DEPRESSIVE DISORDER (HCC): Primary | ICD-10-CM

## 2023-02-24 DIAGNOSIS — I10 PRIMARY HYPERTENSION: ICD-10-CM

## 2023-02-24 NOTE — PROGRESS NOTES
Assessment/Plan:       Problem List Items Addressed This Visit        Cardiovascular and Mediastinum    Primary hypertension       Other    Mild episode of recurrent major depressive disorder (HCC) - Primary    Anger         Continue current medications, follow-up in 3 months  Call with any concerns in meantime  Recommended to try increasing vegetables/fiber with meals  Subjective:      Patient ID: Terry Hensley is a 44 y o  male  HPI     Hypertension- Currently taking amlodipine 5 mg daily  No current home monitoring  Feeling well  Depression/anger/irritability- Currently taking Prozac 40 mg daily  He is unsure if helping, still some loss of interest in hobbies  Overall feels okay  PHQ-2/9 Depression Screening    Little interest or pleasure in doing things: 0 - not at all  Feeling down, depressed, or hopeless: 0 - not at all  Trouble falling or staying asleep, or sleeping too much: 0 - not at all  Feeling tired or having little energy: 0 - not at all  Poor appetite or overeatin - not at all  Feeling bad about yourself - or that you are a failure or have let yourself or your family down: 0 - not at all  Trouble concentrating on things, such as reading the newspaper or watching television: 0 - not at all  Moving or speaking so slowly that other people could have noticed  Or the opposite - being so fidgety or restless that you have been moving around a lot more than usual: 0 - not at all  Thoughts that you would be better off dead, or of hurting yourself in some way: 0 - not at all  PHQ-9 Score: 0   PHQ-9 Interpretation: No or Minimal depression          BMI 36- Taking phentermine per endocrinology, trouble with portions being larger       The following portions of the patient's history were reviewed and updated as appropriate: allergies, current medications, past family history, past medical history, past social history, past surgical history, and problem list     Review of Systems   All other systems reviewed and are negative  Objective:      /72   Pulse 78   Temp 98 1 °F (36 7 °C)   Resp 18   Ht 6' 2" (1 88 m)   Wt 130 kg (287 lb)   SpO2 99%   BMI 36 85 kg/m²          Physical Exam  Vitals reviewed  Constitutional:       General: He is not in acute distress  Appearance: Normal appearance  He is not ill-appearing, toxic-appearing or diaphoretic  HENT:      Head: Normocephalic and atraumatic  Eyes:      General:         Right eye: No discharge  Left eye: No discharge  Extraocular Movements: Extraocular movements intact  Conjunctiva/sclera: Conjunctivae normal    Cardiovascular:      Rate and Rhythm: Normal rate and regular rhythm  Heart sounds: Normal heart sounds  No murmur heard  No friction rub  No gallop  Pulmonary:      Effort: Pulmonary effort is normal  No respiratory distress  Breath sounds: Normal breath sounds  No stridor  No wheezing or rhonchi  Musculoskeletal:         General: No swelling, tenderness or signs of injury  Right lower leg: No edema  Left lower leg: No edema  Skin:     General: Skin is warm  Coloration: Skin is not pale  Findings: No erythema or rash  Neurological:      Mental Status: He is alert and oriented to person, place, and time  Motor: No weakness     Psychiatric:         Mood and Affect: Mood normal          Behavior: Behavior normal              Sam Jameson DO  [facility]

## 2023-03-06 ENCOUNTER — APPOINTMENT (EMERGENCY)
Dept: RADIOLOGY | Facility: HOSPITAL | Age: 38
End: 2023-03-06

## 2023-03-06 ENCOUNTER — HOSPITAL ENCOUNTER (EMERGENCY)
Facility: HOSPITAL | Age: 38
Discharge: HOME | End: 2023-03-06
Attending: STUDENT IN AN ORGANIZED HEALTH CARE EDUCATION/TRAINING PROGRAM | Admitting: STUDENT IN AN ORGANIZED HEALTH CARE EDUCATION/TRAINING PROGRAM

## 2023-03-06 VITALS
WEIGHT: 290 LBS | BODY MASS INDEX: 37.22 KG/M2 | TEMPERATURE: 97.8 F | OXYGEN SATURATION: 95 % | RESPIRATION RATE: 20 BRPM | SYSTOLIC BLOOD PRESSURE: 141 MMHG | DIASTOLIC BLOOD PRESSURE: 88 MMHG | HEART RATE: 76 BPM | HEIGHT: 74 IN

## 2023-03-06 DIAGNOSIS — S60.222A CONTUSION OF LEFT HAND, INITIAL ENCOUNTER: Primary | ICD-10-CM

## 2023-03-06 NOTE — ED PROVIDER NOTES
History  Chief Complaint   Patient presents with   • Hand Pain     C/o left hand pain and swelling   Pt reports dx w/  carpal tunnel syndrome   HPI 39-year-old male presents to the emergency room after sustaining injury to his left wrist   Patient states he is a  and was drilling a hole yesterday when the drill torqued his left hand resulting in a twisting injury of the left wrist   Patient states he felt a snapping sensation and over the last 24 hours has been having progressively worsening pain and swelling of the left hand  Fortunately he is right-hand dominant denies any previous injuries to the left hand  Prior to Admission Medications   Prescriptions Last Dose Informant Patient Reported? Taking? BD PEN NEEDLE MELIA U/F 32G X 4 MM MISC   Yes No   Sig: use four times a day with INSULIN   FLUoxetine (PROzac) 40 MG capsule   No No   Sig: Take 1 capsule (40 mg total) by mouth daily   NovoLOG FlexPen 100 units/mL injection pen   No No   Sig: Inject 40 Units under the skin 2 (two) times a day with meals   Tresiba FlexTouch 200 units/mL CONCENTRATED U-200 injection pen   No No   Sig: Inject 120 Units under the skin daily   amLODIPine (NORVASC) 5 mg tablet   No No   Sig: Take 1 tablet (5 mg total) by mouth daily   fenofibrate micronized (LOFIBRA) 200 MG capsule   Yes No   Sig: take 1 capsule by mouth once daily with BREAKFAST   glucagon (GLUCAGEN) 1 mg injection   Yes No   Si mg once   metFORMIN (GLUCOPHAGE) 500 mg tablet   No No   Sig: Take 2 tablets (1,000 mg total) by mouth 2 (two) times a day with meals   phentermine 15 MG capsule   Yes No   Sig: Take 15 mg by mouth every morning   rosuvastatin (CRESTOR) 20 MG tablet   Yes No   Sig: Take 20 mg by mouth daily      Facility-Administered Medications: None       Past Medical History:   Diagnosis Date   • Diabetes mellitus        History reviewed  No pertinent surgical history      Family History   Problem Relation Age of Onset   • Thyroid disease Mother    • Hypertension Father    • Hyperlipidemia Father    • Diabetes Father    • Thyroid disease Father    • Heart attack Father      I have reviewed and agree with the history as documented  E-Cigarette/Vaping   • E-Cigarette Use Current Every Day User      E-Cigarette/Vaping Substances   • Nicotine Yes    • THC No    • CBD No    • Flavoring Yes    • Other No    • Unknown No      Social History     Tobacco Use   • Smoking status: Former     Types: E-Cigarettes   • Smokeless tobacco: Never   Vaping Use   • Vaping Use: Every day   • Substances: Nicotine, Flavoring   Substance Use Topics   • Alcohol use: Not Currently   • Drug use: Not Currently       Review of Systems   Constitutional: Negative for activity change, appetite change, chills, fatigue and fever  HENT: Negative for congestion, dental problem, drooling, ear discharge, ear pain, facial swelling, postnasal drip, rhinorrhea and sinus pain  Eyes: Negative for photophobia, pain, discharge and itching  Respiratory: Negative for apnea, cough, chest tightness and shortness of breath  Cardiovascular: Negative for chest pain and leg swelling  Gastrointestinal: Negative for abdominal distention, abdominal pain, anal bleeding, constipation, diarrhea and nausea  Endocrine: Negative for cold intolerance, heat intolerance and polydipsia  Genitourinary: Negative for difficulty urinating  Musculoskeletal: Negative for arthralgias, gait problem, joint swelling and myalgias  Hand pain   Skin: Negative for color change and pallor  Allergic/Immunologic: Negative for immunocompromised state  Neurological: Negative for dizziness, seizures, facial asymmetry, weakness, light-headedness, numbness and headaches  Psychiatric/Behavioral: Negative for agitation, behavioral problems, confusion, decreased concentration and dysphoric mood  All other systems reviewed and are negative        Physical Exam  Physical Exam  Constitutional: Appearance: He is well-developed  HENT:      Head: Normocephalic  Eyes:      Pupils: Pupils are equal, round, and reactive to light  Cardiovascular:      Rate and Rhythm: Normal rate and regular rhythm  Pulmonary:      Effort: Pulmonary effort is normal       Breath sounds: Normal breath sounds  Abdominal:      General: Bowel sounds are normal       Palpations: Abdomen is soft  Musculoskeletal:         General: Normal range of motion  Cervical back: Normal range of motion and neck supple  Comments: Left wrist: Mild amount of swelling to the left wrist no obvious deformity overlying skin changes  There is pain to tenderness on palpation of the medial aspect of the wrist   Negative anatomical snuffbox tenderness  He is able to fully extend and flex all 5 fingers   Skin:     General: Skin is warm  Vital Signs  ED Triage Vitals [03/06/23 0932]   Temperature Pulse Respirations Blood Pressure SpO2   97 8 °F (36 6 °C) 76 20 141/88 95 %      Temp Source Heart Rate Source Patient Position - Orthostatic VS BP Location FiO2 (%)   Temporal Monitor Sitting Right arm --      Pain Score       3           Vitals:    03/06/23 0932   BP: 141/88   Pulse: 76   Patient Position - Orthostatic VS: Sitting         Visual Acuity      ED Medications  Medications - No data to display    Diagnostic Studies  Results Reviewed     None                 XR wrist 3+ views LEFT    (Results Pending)              Procedures  Procedures         ED Course  ED Course as of 03/06/23 1001   Mon Mar 06, 2023   0958 X-rays interpreted by myself no acute osseous injury    There is a radioopaque foreign body in the mid forearm however this does not correlate with the location of patient's pain                                             Medical Decision Making  Contusion of left hand, initial encounter: acute illness or injury  Amount and/or Complexity of Data Reviewed  External Data Reviewed: labs, radiology, ECG and notes  Labs:  Decision-making details documented in ED Course  Radiology: ordered and independent interpretation performed  Decision-making details documented in ED Course  ECG/medicine tests: independent interpretation performed  Decision-making details documented in ED Course  Disposition  Final diagnoses:   Contusion of left hand, initial encounter     Time reflects when diagnosis was documented in both MDM as applicable and the Disposition within this note     Time User Action Codes Description Comment    3/6/2023  9:59 AM Princess Oneil Add [A13 319] Right hand pain     3/6/2023  9:59 AM Plavin, Danna Favorite Add [S60 222A] Contusion of left hand, initial encounter     3/6/2023 10:00 AM Princess Oneil Modify [V39 140G] Contusion of left hand, initial encounter     3/6/2023 10:00 AM Yina Germain [D67 071] Right hand pain       ED Disposition     ED Disposition   Discharge    Condition   Stable    Date/Time   Mon Mar 6, 2023  9:59 AM    Comment   Lowanda Liter discharge to home/self care  Follow-up Information     Follow up With Specialties Details Why Contact Info    Shawna Umanzor DO Family Medicine Call   [address]  [place] [facility]  [phone]            Patient's Medications   Discharge Prescriptions    No medications on file       No discharge procedures on file      PDMP Review     None          ED Provider  Electronically Signed by           Jaison Verde MD  03/06/23 4986

## 2023-03-22 NOTE — PROGRESS NOTES
[facility] Gastroenterology  Gastroenterology Outpatient Consultation  Patient Bry Noriega   Age 44 y o  Gender male   MRN: [id number]  [facility] [id number]     ASSESSMENT AND PLAN:   Problem List Items Addressed This Visit        Digestive    Gastroesophageal reflux disease     It is quite possible patient does have GERD leading to his symptoms however at this point time he is not reporting any classic heartburn or reflux type symptoms  Relevant Medications    famotidine (PEPCID) 20 mg tablet    Other Relevant Orders    EGD    Pancreatic cyst     Eldridge Essex does have a history of pancreatitis back in March/April 2019  At that time his triglycerides were over 4000  His pancreatitis was likely related to hypertriglyceridemia  He was noted to have a probable cyst in the tail of the pancreas  This was likely a pseudocyst related to pancreatitis  However given his history of pancreatitis and a cyst in the tail the pancreas, I did suggest MRI imaging to further evaluate this  Relevant Orders    MRI abdomen w wo contrast and mrcp    Fatty liver     Patient was noted to have fatty infiltration of liver on imaging studies in the past   This is likely multifactorial related to hyperlipidemia, obesity, type 2 diabetes etc   He has been keeping his diabetes under better control  He is also keeping his lipid panel under better control  We did talk about the need for weight reduction, losing 7 to 10% of his body weight over the next 12 months will help fatty liver  At some point consider ultrasound elastography however I did not order it at this time  Other    Intermittent chest pain - Primary     Patient experienced a couple episodes of chest pain that led him to the ER one in September 1 December  These episodes were associated with some shortness of breath, radiation of the pain through to the back in the shoulder and jaw and lightheadedness    He did have a cardiac work-up and was told this was not cardiac related  More recently at the onset of this discomfort he would take a Pepcid which would help  It is quite possible his chest pain is related to reflux and/or esophageal spasm  For now I will hold off on a proton pump inhibitor such as omeprazole although I think he would probably benefit from this at some point  Schedule upper endoscopy  Continue to use famotidine, I would have no objection if he wanted to take 1 tablet daily maybe 2 hours before bed    I explained to the patient the procedure of upper endoscopy as well as its potential risk which are approximately 1 in 1000 chance of bleeding, infection, and perforation  Relevant Medications    famotidine (PEPCID) 20 mg tablet    Other Relevant Orders    EGD   Other Visit Diagnoses     History of pancreatitis        Relevant Orders    MRI abdomen w wo contrast and mrcp         _____________________________________________________________    HPI:   Lady Wade is a delightful 23-year-old gentleman home seen in the office in consultation request of Dr Vance Cheatham for reflux and atypical chest pain  He reports that he started having chest pain back around September where he ended up in the ER  The chest pain was fairly intense and radiated through his back in his jaw  At one point he was short of breath with this and felt light headed and dizzy  This did prompt a cardiac evaluation  He states he underwent stress testing and was told that this was not related to his heart  He recently noticed to be starting to get this discomfort he will take an acid, he thinks it may be Pepcid, and this will take the symptom away  He denies any daily or frequent heartburn or reflux symptoms  He states he really does not know what that is  He does not describe any nocturnal regurgitation  Denies any dysphagia nausea vomiting early satiety or unintentional weight loss  I did review patient's questionnaire    He is not experiencing any epigastric abdominal pain, he just did not have any other options and answering these questions  In reviewing his records I see that he had acute pancreatitis back in 2019  He went on to tell me that he had been diagnosed with diabetes and was not take his medications for couple years when he presented with abdominal pain  His triglyceride level at that time was 4930  His bowel habits are very regular, denies any diarrhea, constipation, bleeding or black stools  Denies any NSAID use  His maternal grandfather had colon and possibly esophageal cancer  There is no other family members with colon cancer, colon polyps, or Conway's esophagus  He does not smoke tobacco at this time and quit many years ago  He does not drink alcohol  Records reviewed for 10 minutes prior to office visit  1/13/2023  Cholesterol 175  Triglycerides 232 down from 1037 11/9/2020 12/9/2022 laboratory data  Glucose 151  Sodium 138  Potassium 4 3  AST 16  ALT 36  Alk phos 45  Lipase 111  Cholesterol 133  Triglycerides 138  LDL 78  WBC 4 8 Hgb 13 3 HCT 40 6 MCV 90 platelet count 098,347  Hemoglobin A1c 7 4      4/6/2019 CT scan abdomen pelvis with IV contrast  Liver is enlarged to 23 cm with fatty infiltration  Peripancreatic inflammatory change at the area of the pancreatic tail  Small amount of fluid at the retroperitoneal space  Small peripancreatic fluid collection undersurface of the pancreatic tail measuring 1 1 x 1 2 x 1 3 cm  Suspect acute pancreatitis with pseudocyst   Diverticulosis without diverticulitis    3/28/2019 CT scan abdomen pelvis  Mild peripancreatic edema/fat stranding adjacent to the pancreatic tail suggestive of acute pancreatitis  Possible cyst tail of the pancreas-14 mm  Mild fatty involution of the head of the pancreas    Liver is enlarged with diffuse fatty infiltration  Some focal fatty sparing noted    Spleen unremarkable    No Known Allergies  Current Outpatient Medications   Medication Sig Dispense Refill   • amLODIPine (NORVASC) 5 mg tablet Take 1 tablet (5 mg total) by mouth daily 90 tablet 1   • BD PEN NEEDLE MELIA U/F 32G X 4 MM MISC use four times a day with INSULIN  0   • famotidine (PEPCID) 20 mg tablet Take 1 tablet (20 mg total) by mouth daily at bedtime Take 2 hours prior to bed 30 tablet 4   • fenofibrate micronized (LOFIBRA) 200 MG capsule take 1 capsule by mouth once daily with BREAKFAST  0   • FLUoxetine (PROzac) 40 MG capsule Take 1 capsule (40 mg total) by mouth daily 90 capsule 1   • glucagon (GLUCAGEN) 1 mg injection 1 mg once     • metFORMIN (GLUCOPHAGE) 500 mg tablet Take 2 tablets (1,000 mg total) by mouth 2 (two) times a day with meals     • NovoLOG FlexPen 100 units/mL injection pen Inject 40 Units under the skin 2 (two) times a day with meals 15 mL 0   • phentermine 15 MG capsule Take 15 mg by mouth every morning     • rosuvastatin (CRESTOR) 20 MG tablet Take 20 mg by mouth daily     • Tresiba FlexTouch 200 units/mL CONCENTRATED U-200 injection pen Inject 120 Units under the skin daily 9 mL 0     No current facility-administered medications for this visit  MEDICAL HISTORY:  Past Medical History:   Diagnosis Date   • Diabetes mellitus      History reviewed  No pertinent surgical history  Social History     Substance and Sexual Activity   Alcohol Use Not Currently     Social History     Substance and Sexual Activity   Drug Use Not Currently     Social History     Tobacco Use   Smoking Status Former   • Types: E-Cigarettes   Smokeless Tobacco Never     Family History   Problem Relation Age of Onset   • Thyroid disease Mother    • Hypertension Father    • Hyperlipidemia Father    • Diabetes Father    • Thyroid disease Father    • Heart attack Father        REVIEW OF SYSTEMS:  CONSTITUTIONAL: Denies any fever, chills, rigors, and weight loss  HEENT: No earache or tinnitus  Denies hearing loss or visual disturbances  CARDIOVASCULAR: No chest pain or palpitations     RESPIRATORY: Denies any cough, hemoptysis, shortness of breath or dyspnea on exertion  GASTROINTESTINAL: As noted in the History of Present Illness  GENITOURINARY: No problems with urination  Denies any hematuria or dysuria  NEUROLOGIC: No dizziness or vertigo, denies headaches  MUSCULOSKELETAL: No significant joint pain but he does have some back pain  SKIN: Denies skin rashes or itching  ENDOCRINE: Denies excessive thirst  Denies intolerance to heat or cold  PSYCHOSOCIAL: He does report some depression has been on medication for couple months  Objective   Blood pressure 136/92, pulse 78, temperature 98 7 °F (37 1 °C), resp  rate 18, height 6' 2" (1 88 m), weight 131 kg (289 lb), SpO2 99 %  Body mass index is 37 11 kg/m²  PHYSICAL EXAM:   General Appearance: Alert, cooperative, no distress  HEENT: Normocephalic, atraumatic, anicteric  Neck: Supple, symmetrical, trachea midline  Lungs: Clear to auscultation bilaterally; no rales, rhonchi or wheezing; respirations unlabored   Heart: Regular rate and rhythm; no murmur, rub, or gallop  Abdomen: Soft, bowel sounds normal, non-tender, non-distended; no masses, there is no hepatosplenomegaly  No spider angiomas  Genitalia: Deferred   Rectal: Deferred   Extremities: No cyanosis, clubbing or edema   Skin: No jaundice, rashes, or lesions   Lymph nodes: No palpable cervical lymphadenopathy   Lab Results:   No visits with results within 2 Month(s) from this visit     Latest known visit with results is:   Hospital Outpatient Visit on 01/10/2023   Component Date Value   • Baseline HR 01/10/2023 86    • Baseline BP 01/10/2023 120/86    • O2 sat rest 01/10/2023 97    • Stress peak HR 01/10/2023 160    • Post peak BP 01/10/2023 166    • Rate Pressure Product 01/10/2023 26,560 0    • O2 sat peak 01/10/2023 98    • Recovery HR 01/10/2023 100    • Recovery BP 01/10/2023 120/72    • O2 sat recovery 01/10/2023 98    • Max HR 01/10/2023 160    • Max HR Percent 01/10/2023 86    • Exercise duration (min) 01/10/2023 9    • Exercise duration (sec) 01/10/2023 21    • Estimated workload 01/10/2023 10 6    • Stress Stage Reached 01/10/2023 4 0    • ST Depression (mm) 01/10/2023 0    • Angina Index 01/10/2023 0    • Dixon Treadmill Score 01/10/2023 9    • Protocol Name 01/10/2023 NEELA    • Time In Exercise Phase 01/10/2023 00:09:21    • MAX  SYSTOLIC BP 16/77/0835 277    • Max Diastolic Bp 86/38/6373 74    • Max Heart Rate 01/10/2023 160    • Max Predicted Heart Rate 01/10/2023 182    • Reason for Termination 01/10/2023 Leg discomfort    • Test Indication 01/10/2023 Screening for CAD    • Target Hr Formular 01/10/2023 (220 - Age)*85%    • Chest Pain Statement 01/10/2023 none      Radiology Results:   XR wrist 3+ views LEFT    Result Date: 3/6/2023  Narrative: LEFT WRIST INDICATION:   Pain  COMPARISON:  None VIEWS:  XR WRIST 3+ VW LEFT FINDINGS: There is no acute fracture or dislocation  11th metacarpal old trauma  No lytic or blastic osseous lesion  Distal forearm lateral 0 3 cm metallic foreign body  Impression: No acute osseous abnormality   Workstation performed: [address] [place] [id number],    03/23/23   Cc:

## 2023-03-23 ENCOUNTER — CONSULT (OUTPATIENT)
Dept: GASTROENTEROLOGY | Facility: CLINIC | Age: 38
End: 2023-03-23

## 2023-03-23 VITALS
DIASTOLIC BLOOD PRESSURE: 92 MMHG | WEIGHT: 289 LBS | RESPIRATION RATE: 18 BRPM | BODY MASS INDEX: 37.09 KG/M2 | HEIGHT: 74 IN | OXYGEN SATURATION: 99 % | HEART RATE: 78 BPM | TEMPERATURE: 98.7 F | SYSTOLIC BLOOD PRESSURE: 136 MMHG

## 2023-03-23 DIAGNOSIS — R07.9 INTERMITTENT CHEST PAIN: Primary | ICD-10-CM

## 2023-03-23 DIAGNOSIS — K86.2 PANCREATIC CYST: ICD-10-CM

## 2023-03-23 DIAGNOSIS — K21.9 GASTROESOPHAGEAL REFLUX DISEASE, UNSPECIFIED WHETHER ESOPHAGITIS PRESENT: ICD-10-CM

## 2023-03-23 DIAGNOSIS — K76.0 FATTY LIVER: ICD-10-CM

## 2023-03-23 DIAGNOSIS — Z87.19 HISTORY OF PANCREATITIS: ICD-10-CM

## 2023-03-23 RX ORDER — LIDOCAINE HYDROCHLORIDE 20 MG/ML
15 SOLUTION OROPHARYNGEAL ONCE
OUTPATIENT
Start: 2023-03-23 | End: 2023-03-23

## 2023-03-23 RX ORDER — FAMOTIDINE 20 MG/1
20 TABLET, FILM COATED ORAL
Qty: 30 TABLET | Refills: 4 | Status: SHIPPED | OUTPATIENT
Start: 2023-03-23

## 2023-03-23 NOTE — ASSESSMENT & PLAN NOTE
Patient was noted to have fatty infiltration of liver on imaging studies in the past   This is likely multifactorial related to hyperlipidemia, obesity, type 2 diabetes etc   He has been keeping his diabetes under better control  He is also keeping his lipid panel under better control  We did talk about the need for weight reduction, losing 7 to 10% of his body weight over the next 12 months will help fatty liver  At some point consider ultrasound elastography however I did not order it at this time

## 2023-03-23 NOTE — PATIENT INSTRUCTIONS
Intermittent chest pain  Patient experienced a couple episodes of chest pain that led him to the ER one in September 1 December  These episodes were associated with some shortness of breath, radiation of the pain through to the back in the shoulder and jaw and lightheadedness  He did have a cardiac work-up and was told this was not cardiac related  More recently at the onset of this discomfort he would take a Pepcid which would help  It is quite possible his chest pain is related to reflux and/or esophageal spasm  For now I will hold off on a proton pump inhibitor such as omeprazole although I think he would probably benefit from this at some point  Schedule upper endoscopy  Continue to use famotidine, I would have no objection if he wanted to take 1 tablet daily maybe 2 hours before bed    I explained to the patient the procedure of upper endoscopy as well as its potential risk which are approximately 1 in 1000 chance of bleeding, infection, and perforation  Gastroesophageal reflux disease  It is quite possible patient does have GERD leading to his symptoms however at this point time he is not reporting any classic heartburn or reflux type symptoms  Pancreatic cyst  Chris Jones does have a history of pancreatitis back in March/April 2019  At that time his triglycerides were over 4000  His pancreatitis was likely related to hypertriglyceridemia  He was noted to have a probable cyst in the tail of the pancreas  This was likely a pseudocyst related to pancreatitis  However given his history of pancreatitis and a cyst in the tail the pancreas, I did suggest MRI imaging to further evaluate this  Fatty liver  Patient was noted to have fatty infiltration of liver on imaging studies in the past   This is likely multifactorial related to hyperlipidemia, obesity, type 2 diabetes etc   He has been keeping his diabetes under better control  He is also keeping his lipid panel under better control    We did talk about the need for weight reduction, losing 7 to 10% of his body weight over the next 12 months will help fatty liver  At some point consider ultrasound elastography however I did not order it at this time      Scheduled date of EGD(as of today):04/05/2023  Physician performing EGD:[place]  Location of EGD:[place]  Instructions reviewed with patient by:fidel  Clearances:None

## 2023-03-23 NOTE — ASSESSMENT & PLAN NOTE
Patient experienced a couple episodes of chest pain that led him to the ER one in September 1 December  These episodes were associated with some shortness of breath, radiation of the pain through to the back in the shoulder and jaw and lightheadedness  He did have a cardiac work-up and was told this was not cardiac related  More recently at the onset of this discomfort he would take a Pepcid which would help  It is quite possible his chest pain is related to reflux and/or esophageal spasm  For now I will hold off on a proton pump inhibitor such as omeprazole although I think he would probably benefit from this at some point  Schedule upper endoscopy  Continue to use famotidine, I would have no objection if he wanted to take 1 tablet daily maybe 2 hours before bed    I explained to the patient the procedure of upper endoscopy as well as its potential risk which are approximately 1 in 1000 chance of bleeding, infection, and perforation

## 2023-03-23 NOTE — ASSESSMENT & PLAN NOTE
It is quite possible patient does have GERD leading to his symptoms however at this point time he is not reporting any classic heartburn or reflux type symptoms

## 2023-03-23 NOTE — ASSESSMENT & PLAN NOTE
Vickie Marquez does have a history of pancreatitis back in March/April 2019  At that time his triglycerides were over 4000  His pancreatitis was likely related to hypertriglyceridemia  He was noted to have a probable cyst in the tail of the pancreas  This was likely a pseudocyst related to pancreatitis  However given his history of pancreatitis and a cyst in the tail the pancreas, I did suggest MRI imaging to further evaluate this

## 2023-03-29 ENCOUNTER — HOSPITAL ENCOUNTER (OUTPATIENT)
Dept: MRI IMAGING | Facility: HOSPITAL | Age: 38
Discharge: HOME | End: 2023-03-29
Attending: INTERNAL MEDICINE

## 2023-03-29 DIAGNOSIS — K86.2 PANCREATIC CYST: ICD-10-CM

## 2023-03-29 DIAGNOSIS — Z87.19 HISTORY OF PANCREATITIS: ICD-10-CM

## 2023-03-29 RX ADMIN — GADOBUTROL 13 ML: 604.72 INJECTION INTRAVENOUS at 20:21

## 2023-04-04 NOTE — RESULT ENCOUNTER NOTE
Please inform patient that the previously identified cyst in the region of the pancreas has completely resolved  Radiology thinks this could have been a fluid collection adjacent to the pancreas which has resolved  Again no sign of lesion or cyst in the pancreas  There is moderate fatty infiltration liver noted  Otherwise unremarkable MRI

## 2023-04-05 ENCOUNTER — ANESTHESIA (OUTPATIENT)
Dept: GASTROENTEROLOGY | Facility: HOSPITAL | Age: 38
End: 2023-04-05

## 2023-04-05 ENCOUNTER — ANESTHESIA EVENT (OUTPATIENT)
Dept: GASTROENTEROLOGY | Facility: HOSPITAL | Age: 38
End: 2023-04-05

## 2023-04-05 RX ORDER — PROPOFOL 10 MG/ML
INJECTION, EMULSION INTRAVENOUS AS NEEDED
Status: DISCONTINUED | OUTPATIENT
Start: 2023-04-05 | End: 2023-04-05

## 2023-04-05 RX ORDER — GLYCOPYRROLATE 0.2 MG/ML
INJECTION INTRAMUSCULAR; INTRAVENOUS AS NEEDED
Status: DISCONTINUED | OUTPATIENT
Start: 2023-04-05 | End: 2023-04-05

## 2023-04-05 RX ORDER — LIDOCAINE HYDROCHLORIDE 20 MG/ML
INJECTION, SOLUTION EPIDURAL; INFILTRATION; INTRACAUDAL; PERINEURAL AS NEEDED
Status: DISCONTINUED | OUTPATIENT
Start: 2023-04-05 | End: 2023-04-05

## 2023-04-05 RX ADMIN — PROPOFOL 50 MG: 10 INJECTION, EMULSION INTRAVENOUS at 07:31

## 2023-04-05 RX ADMIN — LIDOCAINE HYDROCHLORIDE 100 MG: 20 INJECTION, SOLUTION EPIDURAL; INFILTRATION; INTRACAUDAL; PERINEURAL at 07:28

## 2023-04-05 RX ADMIN — GLYCOPYRROLATE 0.2 MG: 0.2 INJECTION, SOLUTION INTRAMUSCULAR; INTRAVENOUS at 07:25

## 2023-04-05 RX ADMIN — PROPOFOL 50 MG: 10 INJECTION, EMULSION INTRAVENOUS at 07:35

## 2023-04-05 RX ADMIN — PROPOFOL 50 MG: 10 INJECTION, EMULSION INTRAVENOUS at 07:29

## 2023-04-05 RX ADMIN — PROPOFOL 200 MG: 10 INJECTION, EMULSION INTRAVENOUS at 07:28

## 2023-04-05 NOTE — ANESTHESIA POSTPROCEDURE EVALUATION
Post-Op Assessment Note    CV Status:  Stable  Pain Score: 0    Pain management: adequate     Mental Status:  Arousable   Hydration Status:  Stable   PONV Controlled:  None   Airway Patency:  Patent      Post Op Vitals Reviewed: Yes      Staff: CRNA         No notable events documented      BP   131/84   Temp     Pulse  86   Resp   14   SpO2   98%

## 2023-04-05 NOTE — ANESTHESIA PREPROCEDURE EVALUATION
Procedure:  EGD    Relevant Problems   CARDIO   (+) Hyperlipidemia   (+) Intermittent chest pain   (+) Primary hypertension      ENDO   (+) Type 2 diabetes mellitus without complication, without long-term current use of insulin (HCC)      GI/HEPATIC   (+) Fatty liver   (+) Gastroesophageal reflux disease   (+) Pancreatic cyst      NEURO/PSYCH   (+) Mild episode of recurrent major depressive disorder [facility])        Physical Exam    Airway    Mallampati score: I  TM Distance: >3 FB  Neck ROM: full     Dental       Cardiovascular  Cardiovascular exam normal    Pulmonary  Pulmonary exam normal     Other Findings        Anesthesia Plan  ASA Score- 2     Anesthesia Type- IV sedation with anesthesia with ASA Monitors  Additional Monitors:   Airway Plan:           Plan Factors-Exercise tolerance (METS): >4 METS  Chart reviewed  EKG reviewed  Imaging results reviewed  Existing labs reviewed  Patient summary reviewed  Induction- intravenous  Postoperative Plan- Plan for postoperative opioid use  Planned trial extubation    Informed Consent- Anesthetic plan and risks discussed with patient  I personally reviewed this patient with the CRNA  Discussed and agreed on the Anesthesia Plan with the CRNA  Jim Lazo

## 2023-04-06 DIAGNOSIS — K21.9 GASTROESOPHAGEAL REFLUX DISEASE, UNSPECIFIED WHETHER ESOPHAGITIS PRESENT: ICD-10-CM

## 2023-04-06 DIAGNOSIS — R07.9 INTERMITTENT CHEST PAIN: ICD-10-CM

## 2023-04-06 RX ORDER — FAMOTIDINE 20 MG/1
20 TABLET, FILM COATED ORAL
Qty: 90 TABLET | Refills: 2 | Status: SHIPPED | OUTPATIENT
Start: 2023-04-06

## 2023-04-28 DIAGNOSIS — K22.10 EROSIVE ESOPHAGITIS: ICD-10-CM

## 2023-04-28 RX ORDER — OMEPRAZOLE 20 MG/1
20 CAPSULE, DELAYED RELEASE ORAL DAILY
Qty: 90 CAPSULE | Refills: 2 | Status: SHIPPED | OUTPATIENT
Start: 2023-04-28

## 2023-05-15 ENCOUNTER — HOSPITAL ENCOUNTER (EMERGENCY)
Facility: HOSPITAL | Age: 38
Discharge: HOME | End: 2023-05-15
Attending: EMERGENCY MEDICINE | Admitting: EMERGENCY MEDICINE

## 2023-05-15 VITALS
SYSTOLIC BLOOD PRESSURE: 139 MMHG | TEMPERATURE: 97.8 F | HEIGHT: 74 IN | RESPIRATION RATE: 20 BRPM | DIASTOLIC BLOOD PRESSURE: 69 MMHG | OXYGEN SATURATION: 96 % | WEIGHT: 289 LBS | HEART RATE: 79 BPM | BODY MASS INDEX: 37.09 KG/M2

## 2023-05-15 DIAGNOSIS — S05.01XA ABRASION OF RIGHT CORNEA, INITIAL ENCOUNTER: Primary | ICD-10-CM

## 2023-05-15 RX ORDER — ERYTHROMYCIN 5 MG/G
OINTMENT OPHTHALMIC
Qty: 3.5 G | Refills: 0 | Status: SHIPPED | OUTPATIENT
Start: 2023-05-15

## 2023-05-15 RX ORDER — TETRACAINE HYDROCHLORIDE 5 MG/ML
1 SOLUTION OPHTHALMIC ONCE
Status: COMPLETED | OUTPATIENT
Start: 2023-05-15 | End: 2023-05-15

## 2023-05-15 RX ADMIN — TETRACAINE HYDROCHLORIDE 1 DROP: 5 SOLUTION OPHTHALMIC at 15:35

## 2023-05-15 RX ADMIN — TETANUS TOXOID, REDUCED DIPHTHERIA TOXOID AND ACELLULAR PERTUSSIS VACCINE, ADSORBED 0.5 ML: 5; 2.5; 8; 8; 2.5 SUSPENSION INTRAMUSCULAR at 15:38

## 2023-05-15 RX ADMIN — FLUORESCEIN SODIUM 1 STRIP: 1 STRIP OPHTHALMIC at 16:58

## 2023-05-15 NOTE — Clinical Note
Clearance Human was seen and treated in our emergency department on 5/15/2023  No restrictions            Diagnosis:     Mehrdad    He may return on this date: 05/17/2023         If you have any questions or concerns, please don't hesitate to call        [address] [place] [address] Way, DO    ______________________________           _______________          _______________  Hospital Representative                              Date                                Time

## 2023-05-15 NOTE — Clinical Note
Mechelle Grullon was seen and treated in our emergency department on 5/15/2023  No restrictions            Diagnosis:     Mehrdad    He may return on this date: 05/17/2023         If you have any questions or concerns, please don't hesitate to call        [address] [place] [address] Way, DO    ______________________________           _______________          _______________  Hospital Representative                              Date                                Time

## 2023-05-15 NOTE — ED PROVIDER NOTES
History  Chief Complaint   Patient presents with   • Eye Problem     I got deer blood in my eye last January  Since then its been really itchy  But today I cant keep my eye open     HPI      This is a very pleasant, nontoxic-appearing, 19-year-old male presents to emergency department with a chief complaint of mitten, weekly, right-sided eye itchiness followed by discharge the eye that is purulent in nature  He recalls that back in end of January he was getting a deer and got some deer blood in his eye and ever since then he has had this intermittent eye complaint  Patient wears glasses but does not wear contacts  Patient's tetanus is not up-to-date, patient is employed as a   Patient feels as if there is something in his eye and the sclera appears to be injected  Patient denies any visual changes, no blurry vision, no loss of visual acuity, no headaches  Patient was seen evaluated by his eye doctor approximately 1 month ago and did not mention this because he felt that it was just intermittent in nature and did not warrant investigation  Patient denies fever or chills  She denies a diffuse headache, lower vision, floaters, excessive tearing or photopsia  Prior to Admission Medications   Prescriptions Last Dose Informant Patient Reported? Taking?    BD PEN NEEDLE MELIA U/F 32G X 4 MM MISC   Yes No   Sig: use four times a day with INSULIN   FLUoxetine (PROzac) 40 MG capsule   No No   Sig: Take 1 capsule (40 mg total) by mouth daily   NovoLOG FlexPen 100 units/mL injection pen   No No   Sig: Inject 40 Units under the skin 2 (two) times a day with meals   Tresiba FlexTouch 200 units/mL CONCENTRATED U-200 injection pen   No No   Sig: Inject 120 Units under the skin daily   amLODIPine (NORVASC) 5 mg tablet   No No   Sig: Take 1 tablet (5 mg total) by mouth daily   famotidine (PEPCID) 20 mg tablet   No No   Sig: TAKE 1 TABLET (20 MG TOTAL) BY MOUTH DAILY AT BEDTIME TAKE 2 HOURS PRIOR TO BED   fenofibrate micronized (LOFIBRA) 200 MG capsule   Yes No   Sig: take 1 capsule by mouth once daily with BREAKFAST   glucagon (GLUCAGEN) 1 mg injection   Yes No   Si mg once   metFORMIN (GLUCOPHAGE) 500 mg tablet   No No   Sig: Take 2 tablets (1,000 mg total) by mouth 2 (two) times a day with meals   omeprazole (PriLOSEC) 20 mg delayed release capsule   No No   Sig: TAKE 1 CAPSULE (20 MG TOTAL) BY MOUTH DAILY BEST TO TAKE 30 MINUTES TO 1 HOUR BEFORE 1 MEAL A DAY   phentermine 15 MG capsule   Yes No   Sig: Take 15 mg by mouth every morning   rosuvastatin (CRESTOR) 20 MG tablet   Yes No   Sig: Take 20 mg by mouth daily      Facility-Administered Medications: None       Past Medical History:   Diagnosis Date   • Diabetes mellitus        History reviewed  No pertinent surgical history  Family History   Problem Relation Age of Onset   • Thyroid disease Mother    • Hypertension Father    • Hyperlipidemia Father    • Diabetes Father    • Thyroid disease Father    • Heart attack Father      I have reviewed and agree with the history as documented  E-Cigarette/Vaping   • E-Cigarette Use Current Every Day User      E-Cigarette/Vaping Substances   • Nicotine Yes    • THC No    • CBD No    • Flavoring Yes    • Other No    • Unknown No      Social History     Tobacco Use   • Smoking status: Former     Types: E-Cigarettes     Quit date:      Years since quittin 3   • Smokeless tobacco: Never   Vaping Use   • Vaping Use: Every day   • Substances: Nicotine, Flavoring   Substance Use Topics   • Alcohol use: Not Currently   • Drug use: Not Currently       Review of Systems   Constitutional: Negative  Negative for fatigue, fever and unexpected weight change  HENT: Negative for congestion, dental problem, drooling, ear discharge, ear pain, facial swelling, hearing loss, mouth sores, nosebleeds, postnasal drip, rhinorrhea, sinus pressure, sinus pain, sneezing, sore throat, tinnitus, trouble swallowing and voice change      Eyes: Positive for photophobia, pain, discharge, redness and itching  Negative for visual disturbance  Respiratory: Negative  Negative for apnea, chest tightness and shortness of breath  Cardiovascular: Negative  Negative for chest pain, palpitations and leg swelling  Gastrointestinal: Negative  Endocrine: Negative  Genitourinary: Negative  Musculoskeletal: Negative  Skin: Negative  Allergic/Immunologic: Negative  Neurological: Negative  Hematological: Negative  Psychiatric/Behavioral: Negative  Physical Exam  Physical Exam  Vitals and nursing note reviewed  Constitutional:       General: He is not in acute distress  Appearance: Normal appearance  He is normal weight  He is not ill-appearing, toxic-appearing or diaphoretic  HENT:      Head: Normocephalic and atraumatic  Right Ear: Ear canal and external ear normal       Left Ear: Tympanic membrane, ear canal and external ear normal       Nose: Nose normal       Mouth/Throat:      Mouth: Mucous membranes are moist    Eyes:      General: Lids are normal  Lids are everted, no foreign bodies appreciated  Vision grossly intact  Gaze aligned appropriately  No allergic shiner, visual field deficit or scleral icterus  Right eye: No foreign body, discharge or hordeolum  Left eye: No foreign body, discharge or hordeolum  Intraocular pressure: Right eye pressure is 12 mmHg  Measurements were taken using an automated tonometer  Extraocular Movements: Extraocular movements intact  Right eye: Normal extraocular motion and no nystagmus  Left eye: No nystagmus  Conjunctiva/sclera:      Right eye: Right conjunctiva is injected  No chemosis, exudate or hemorrhage  Left eye: Left conjunctiva is not injected  No chemosis, exudate or hemorrhage  Pupils: Pupils are equal, round, and reactive to light  Right eye: Pupil is round, reactive and not sluggish   Corneal abrasion and fluorescein uptake present  Octaviano exam negative  Funduscopic exam:     Right eye: No hemorrhage or exudate  Red reflex present  Slit lamp exam:     Right eye: No corneal ulcer, foreign body or photophobia  Comments: No ptosis on exam appreciated  + uptake of fluorescein at the 6 o'clock position  Cardiovascular:      Rate and Rhythm: Normal rate  Pulses: Normal pulses  Pulmonary:      Effort: Pulmonary effort is normal    Abdominal:      General: Abdomen is flat  Bowel sounds are normal    Musculoskeletal:         General: Normal range of motion  Cervical back: Normal range of motion  Skin:     General: Skin is warm  Capillary Refill: Capillary refill takes less than 2 seconds  Neurological:      General: No focal deficit present  Mental Status: He is alert     Psychiatric:         Mood and Affect: Mood normal          Vital Signs  ED Triage Vitals [05/15/23 1450]   Temperature Pulse Respirations Blood Pressure SpO2   97 8 °F (36 6 °C) 79 20 139/69 96 %      Temp Source Heart Rate Source Patient Position - Orthostatic VS BP Location FiO2 (%)   Temporal Monitor Sitting Left arm --      Pain Score       4           Vitals:    05/15/23 1450   BP: 139/69   Pulse: 79   Patient Position - Orthostatic VS: Sitting         Visual Acuity      ED Medications  Medications   tetanus-diphtheria-acellular pertussis (BOOSTRIX) IM injection 0 5 mL (0 5 mL Intramuscular Given 5/15/23 1538)   tetracaine 0 5 % ophthalmic solution 1 drop (1 drop Right Eye Given 5/15/23 1535)   fluorescein sodium sterile ophthalmic strip 1 strip (1 strip Right Eye Given by Other 5/15/23 1658)       Diagnostic Studies  Results Reviewed     None                 No orders to display              Procedures  Procedures         ED Course  ED Course as of 05/15/23 1707   Mon May 15, 2023   1508 Patient seen and evaluated, orders placed, intermittent weakly, subsequent discharged purulent in nature, I appeared to be by history "swollen shut with no copious amount of tear production  Diff diagnosis in this patient is as follows: Conjunctivitis versus foreign body in the eyes as the patient is a   Medical Decision Making  Corneal abrasion noted, no foreign bodies, patient is a  may have occupational exposure having foreign bodies in the eye because he had this repeatedly, no prior history of this previously related to the change in seasons, dentally started after having a drop of your blood in his eye when he was skinning a deer not related to the patient's current presentation, antibiotic ointment prescribed, tetanus updated, IOP's obtained to the right eye, normal, normal fluorescein uptake at the 6 o'clock position, referred to ophthalmology  Portions of the record may have been created with voice recognition software  Occasional wrong word or \"sound a like\" substitutions may have occurred due to the inherent limitations of voice recognition software  Read the chart carefully and recognize, using context, where substitutions have occurred  Counseling: I had a detailed discussion with the patient and/or guardian regarding: the historical points, exam findings, and any diagnostic results supporting the discharge diagnosis, lab results, radiology results, discharge instructions reviewed with patient and/or family/caregiver and understanding was verbalized  Instructions given to return to the emergency department if symptoms worsen or persist, or if there are any questions or concerns that arise at home      Risk  Prescription drug management            Disposition  Final diagnoses:   Abrasion of right cornea, initial encounter     Time reflects when diagnosis was documented in both MDM as applicable and the Disposition within this note     Time User Action Codes Description Comment    5/15/2023  4:51 PM Arrie Monday Add [S05 01XA] Abrasion of right cornea, initial " encounter       ED Disposition     ED Disposition   Discharge    Condition   Stable    Date/Time   Mon May 15, 2023  3:04 PM    Comment   Archana Thomason discharge to home/self care                 Follow-up Information     Follow up With Specialties Details Why [address] [facility], [address]  [place] [address]      Jorge Berrios MD Ophthalmology   [facility] [address]  [phone]            Discharge Medication List as of 5/15/2023  4:55 PM      START taking these medications    Details   erythromycin (ILOTYCIN) ophthalmic ointment Place a 1/2 inch ribbon of ointment into the lower eyelid, apply every 4 hours x 6 days, Normal         CONTINUE these medications which have NOT CHANGED    Details   amLODIPine (NORVASC) 5 mg tablet Take 1 tablet (5 mg total) by mouth daily, Starting Wed 1/18/2023, Normal      BD PEN NEEDLE MELIA U/F 32G X 4 MM MISC use four times a day with INSULIN, Historical Med      famotidine (PEPCID) 20 mg tablet TAKE 1 TABLET (20 MG TOTAL) BY MOUTH DAILY AT BEDTIME TAKE 2 HOURS PRIOR TO BED, Starting Thu 4/6/2023, Normal      fenofibrate micronized (LOFIBRA) 200 MG capsule take 1 capsule by mouth once daily with BREAKFAST, Historical Med      FLUoxetine (PROzac) 40 MG capsule Take 1 capsule (40 mg total) by mouth daily, Starting Fri 12/16/2022, Normal      glucagon (GLUCAGEN) 1 mg injection 1 mg once, Historical Med      metFORMIN (GLUCOPHAGE) 500 mg tablet Take 2 tablets (1,000 mg total) by mouth 2 (two) times a day with meals, Starting Tue 11/8/2022, No Print      NovoLOG FlexPen 100 units/mL injection pen Inject 40 Units under the skin 2 (two) times a day with meals, Starting Tue 11/8/2022, No Print      omeprazole (PriLOSEC) 20 mg delayed release capsule TAKE 1 CAPSULE (20 MG TOTAL) BY MOUTH DAILY BEST TO TAKE 30 MINUTES TO 1 HOUR BEFORE 1 MEAL A DAY, Starting Fri 4/28/2023, Normal      phentermine 15 MG capsule Take 15 mg by mouth every morning, Historical Med      rosuvastatin (CRESTOR) 20 MG tablet Take 20 mg by mouth daily, Starting Wed 11/18/2020, Historical Med      Tresiba FlexTouch 200 units/mL CONCENTRATED U-200 injection pen Inject 120 Units under the skin daily, Starting Tue 11/8/2022, No Print                 PDMP Review     None          ED Provider  Electronically Signed by           Rufus Martinez III,   05/15/23 7357

## 2023-08-16 ENCOUNTER — TELEPHONE (OUTPATIENT)
Dept: ADMINISTRATIVE | Facility: OTHER | Age: 38
End: 2023-08-16

## 2023-08-16 NOTE — TELEPHONE ENCOUNTER
Upon review of the In Basket request we were able to locate, review, and update the patient chart as requested for Diabetic Foot Exam.    Any additional questions or concerns should be emailed to the Practice Liaisons via the appropriate education email address, please do not reply via In Basket.     Thank you  Kathy Chun MA

## 2023-08-16 NOTE — TELEPHONE ENCOUNTER
----- Message from Adamaris Horn sent at 8/15/2023  1:00 PM EDT -----  Regarding: DM foot exam  08/15/23 1:00 PM    Hello, our patient Tomy Jay has had Diabetic Foot Exam completed/performed. Please assist in updating the patient chart by pulling the Care Everywhere (CE) document. The date of service is 1/17/2023.      Thank you,  Shandra Nj  [facility] [address]

## 2024-01-30 ENCOUNTER — TELEPHONE (OUTPATIENT)
Dept: FAMILY MEDICINE CLINIC | Facility: CLINIC | Age: 39
End: 2024-01-30

## 2024-02-14 ENCOUNTER — APPOINTMENT (OUTPATIENT)
Dept: LAB | Facility: HOSPITAL | Age: 39
End: 2024-02-14
Payer: COMMERCIAL

## 2024-02-14 ENCOUNTER — CONSULT (OUTPATIENT)
Dept: FAMILY MEDICINE CLINIC | Facility: CLINIC | Age: 39
End: 2024-02-14
Payer: COMMERCIAL

## 2024-02-14 ENCOUNTER — HOSPITAL ENCOUNTER (OUTPATIENT)
Dept: RADIOLOGY | Facility: HOSPITAL | Age: 39
Discharge: HOME | End: 2024-02-14
Payer: COMMERCIAL

## 2024-02-14 ENCOUNTER — OFFICE VISIT (OUTPATIENT)
Dept: LAB | Facility: HOSPITAL | Age: 39
End: 2024-02-14
Payer: COMMERCIAL

## 2024-02-14 VITALS
HEIGHT: 74 IN | HEART RATE: 88 BPM | BODY MASS INDEX: 35.96 KG/M2 | TEMPERATURE: 97.9 F | WEIGHT: 280.2 LBS | SYSTOLIC BLOOD PRESSURE: 132 MMHG | RESPIRATION RATE: 18 BRPM | OXYGEN SATURATION: 96 % | DIASTOLIC BLOOD PRESSURE: 84 MMHG

## 2024-02-14 DIAGNOSIS — M75.101 TEAR OF RIGHT ROTATOR CUFF, UNSPECIFIED TEAR EXTENT, UNSPECIFIED WHETHER TRAUMATIC: ICD-10-CM

## 2024-02-14 DIAGNOSIS — Z01.811 PRE-OPERATIVE RESPIRATORY EXAMINATION: ICD-10-CM

## 2024-02-14 DIAGNOSIS — R68.82 LOW LIBIDO: ICD-10-CM

## 2024-02-14 DIAGNOSIS — E11.9 TYPE 2 DIABETES MELLITUS WITHOUT COMPLICATION, WITHOUT LONG-TERM CURRENT USE OF INSULIN (HCC): ICD-10-CM

## 2024-02-14 DIAGNOSIS — F33.0 MILD EPISODE OF RECURRENT MAJOR DEPRESSIVE DISORDER (HCC): ICD-10-CM

## 2024-02-14 DIAGNOSIS — R03.0 ELEVATED BLOOD PRESSURE READING: ICD-10-CM

## 2024-02-14 DIAGNOSIS — Z01.812 ENCOUNTER FOR PRE-OPERATIVE LABORATORY TESTING: ICD-10-CM

## 2024-02-14 DIAGNOSIS — Z01.818 PRE-OP EXAMINATION: ICD-10-CM

## 2024-02-14 DIAGNOSIS — M75.101 TEAR OF RIGHT ROTATOR CUFF, UNSPECIFIED TEAR EXTENT, UNSPECIFIED WHETHER TRAUMATIC: Primary | ICD-10-CM

## 2024-02-14 DIAGNOSIS — I10 PRIMARY HYPERTENSION: ICD-10-CM

## 2024-02-14 DIAGNOSIS — R45.4 ANGER: ICD-10-CM

## 2024-02-14 LAB
ATRIAL RATE: 76 BPM
CHOLEST SERPL-MCNC: 183 MG/DL
CREAT UR-MCNC: 87.8 MG/DL
HDLC SERPL-MCNC: 21 MG/DL
LDLC SERPL DIRECT ASSAY-MCNC: 26 MG/DL (ref 0–100)
MICROALBUMIN UR-MCNC: 37.7 MG/L
MICROALBUMIN/CREAT 24H UR: 43 MG/G CREATININE (ref 0–30)
P AXIS: 42 DEGREES
PR INTERVAL: 152 MS
QRS AXIS: 20 DEGREES
QRSD INTERVAL: 88 MS
QT INTERVAL: 372 MS
QTC INTERVAL: 418 MS
T WAVE AXIS: 9 DEGREES
TRIGL SERPL-MCNC: 967 MG/DL
TSH SERPL DL<=0.05 MIU/L-ACNC: 2.23 UIU/ML (ref 0.45–4.5)
VENTRICULAR RATE: 76 BPM

## 2024-02-14 PROCEDURE — 83036 HEMOGLOBIN GLYCOSYLATED A1C: CPT

## 2024-02-14 PROCEDURE — 93005 ELECTROCARDIOGRAM TRACING: CPT

## 2024-02-14 PROCEDURE — 82043 UR ALBUMIN QUANTITATIVE: CPT

## 2024-02-14 PROCEDURE — 80061 LIPID PANEL: CPT

## 2024-02-14 PROCEDURE — 82570 ASSAY OF URINE CREATININE: CPT

## 2024-02-14 PROCEDURE — 99214 OFFICE O/P EST MOD 30 MIN: CPT | Performed by: FAMILY MEDICINE

## 2024-02-14 PROCEDURE — 71046 X-RAY EXAM CHEST 2 VIEWS: CPT

## 2024-02-14 PROCEDURE — 83721 ASSAY OF BLOOD LIPOPROTEIN: CPT

## 2024-02-14 PROCEDURE — 84443 ASSAY THYROID STIM HORMONE: CPT

## 2024-02-14 PROCEDURE — 36415 COLL VENOUS BLD VENIPUNCTURE: CPT

## 2024-02-14 NOTE — PROGRESS NOTES
"Mehrdad Aponte is a 38 y.o. male with past medical history significant for diabetes, GERD, hypertension, hyperlipidemia, depression who is planning to undergo Right rotator cuff repair under general anesthesia with regional by Dr. Bautista on 2/23/24.    Patient has not had complications with anesthesia in the past.    ROS:   Chest pain: no   Shortness of breath: no  Shortness of breath with exertion: no  Orthopnea: no  Dizziness: no  Unexplained weight change: no    PMH:  CAD: no  HTN: yes  CKD: no  DM: yes on insulin: yes  History of CVA: no     reports that he quit smoking about 6 years ago. His smoking use included e-cigarettes. He has never used smokeless tobacco. He reports that he does not currently use alcohol. He reports that he does not currently use drugs.    Lab Results   Component Value Date    CREATININE 1.03 02/12/2024       Vitals:    02/14/24 0913 02/14/24 1003   BP: 138/84 132/84   BP Location: Right arm Left arm   Pulse: 88    Resp: 18    Temp: 97.9 °F (36.6 °C)    TempSrc: Temporal    SpO2: 96%    Weight: 127 kg (280 lb 3.2 oz)    Height: 6' 2\" (1.88 m)      Physical Exam  Vitals reviewed.   Constitutional:       General: He is not in acute distress.     Appearance: Normal appearance. He is not ill-appearing, toxic-appearing or diaphoretic.   HENT:      Head: Normocephalic and atraumatic.   Eyes:      General:         Right eye: No discharge.         Left eye: No discharge.      Extraocular Movements: Extraocular movements intact.      Conjunctiva/sclera: Conjunctivae normal.   Cardiovascular:      Rate and Rhythm: Normal rate and regular rhythm.      Heart sounds: Normal heart sounds. No murmur heard.     No friction rub. No gallop.   Pulmonary:      Effort: Pulmonary effort is normal. No respiratory distress.      Breath sounds: No stridor. No wheezing or rhonchi.      Comments: Crackles left base   Musculoskeletal:         General: No swelling, tenderness or signs of injury.      Right lower leg: " No edema.      Left lower leg: No edema.   Skin:     General: Skin is warm.      Coloration: Skin is not pale.      Findings: No erythema or rash.   Neurological:      Mental Status: He is alert and oriented to person, place, and time.      Motor: No weakness.   Psychiatric:         Mood and Affect: Mood normal.         Behavior: Behavior normal.          Mehrdad was seen today for pre-op exam.    Diagnoses and all orders for this visit:    Tear of right rotator cuff, unspecified tear extent, unspecified whether traumatic  -     Cancel: XR chest pa & lateral; Future  -     Testosterone, free, total; Future  -     XR chest pa & lateral; Future    Pre-op examination    Type 2 diabetes mellitus without complication, without long-term current use of insulin (HCC)  -     Albumin / creatinine urine ratio; Future  -     Cancel: XR chest pa & lateral; Future  -     Hemoglobin A1C; Future  -     Lipid Panel with Direct LDL reflex; Future  -     XR chest pa & lateral; Future    Primary hypertension    Elevated blood pressure reading    Low libido  -     Testosterone, free, total; Future  -     TSH, 3rd generation with Free T4 reflex; Future    Mild episode of recurrent major depressive disorder (HCC)    Anger      Pre-op testing reviewed:    A1c in July 8.2, fasting glucose 318. CBC unremarkable. Sodium low 131 in setting of hyperglycemia. Kidney function normal. Recommended updated A1c, will complete blood work today.     Blood pressure borderline today, will monitor closely.     He mentions low libido, depression and irritability flaring. Not currently taking Prozac. Blood work ordered, follow-up further next week.     PHQ-2/9 Depression Screening    Little interest or pleasure in doing things: 3 - nearly every day  Feeling down, depressed, or hopeless: 3 - nearly every day  Trouble falling or staying asleep, or sleeping too much: 3 - nearly every day  Feeling tired or having little energy: 3 - nearly every day  Poor appetite  "or overeating: 3 - nearly every day  Feeling bad about yourself - or that you are a failure or have let yourself or your family down: 0 - not at all  Trouble concentrating on things, such as reading the newspaper or watching television: 1 - several days  Moving or speaking so slowly that other people could have noticed. Or the opposite - being so fidgety or restless that you have been moving around a lot more than usual: 0 - not at all  Thoughts that you would be better off dead, or of hurting yourself in some way: 0 - not at all  PHQ-9 Score: 16  PHQ-9 Interpretation: Moderately severe depression       Stress testing January 2023- \"No clinical or ECG evidence of ischemia by exercise stress testing at a maximum workload. \"    Recent URI and still coughing, no fevers. Recommended chest XR.     Recommendations:  Mehrdad Aponte is undergoing an elective Moderate Risk surgery.  Further work-up as above is needed prior to surgery.       Kavitha De Santiago, DO  [facility]     "

## 2024-02-15 LAB
EST. AVERAGE GLUCOSE BLD GHB EST-MCNC: 258 MG/DL
HBA1C MFR BLD: 10.6 %

## 2024-02-17 ENCOUNTER — APPOINTMENT (OUTPATIENT)
Dept: LAB | Facility: CLINIC | Age: 39
End: 2024-02-17
Payer: COMMERCIAL

## 2024-02-17 PROCEDURE — 84403 ASSAY OF TOTAL TESTOSTERONE: CPT

## 2024-02-17 PROCEDURE — 36415 COLL VENOUS BLD VENIPUNCTURE: CPT

## 2024-02-17 PROCEDURE — 84402 ASSAY OF FREE TESTOSTERONE: CPT

## 2024-02-19 ENCOUNTER — TELEPHONE (OUTPATIENT)
Dept: FAMILY MEDICINE CLINIC | Facility: CLINIC | Age: 39
End: 2024-02-19

## 2024-02-19 NOTE — TELEPHONE ENCOUNTER
Mehrdad has an upcoming appointment 2/21 for pre-op, can we check with the orthopedics office to see if the surgeon has reviewed/their thoughts on proceeding with A1c of 10.6? I was wondering how urgent the surgery is. Thanks!

## 2024-02-20 LAB
TESTOST FREE SERPL-MCNC: 9.9 PG/ML (ref 8.7–25.1)
TESTOST SERPL-MCNC: 293 NG/DL (ref 264–916)

## 2024-02-21 ENCOUNTER — OFFICE VISIT (OUTPATIENT)
Dept: FAMILY MEDICINE CLINIC | Facility: CLINIC | Age: 39
End: 2024-02-21
Payer: COMMERCIAL

## 2024-02-21 VITALS
HEIGHT: 74 IN | WEIGHT: 281.6 LBS | HEART RATE: 101 BPM | TEMPERATURE: 97.8 F | RESPIRATION RATE: 18 BRPM | BODY MASS INDEX: 36.14 KG/M2 | SYSTOLIC BLOOD PRESSURE: 132 MMHG | OXYGEN SATURATION: 98 % | DIASTOLIC BLOOD PRESSURE: 80 MMHG

## 2024-02-21 DIAGNOSIS — R68.82 LOW LIBIDO: ICD-10-CM

## 2024-02-21 DIAGNOSIS — I10 PRIMARY HYPERTENSION: ICD-10-CM

## 2024-02-21 DIAGNOSIS — R07.9 INTERMITTENT CHEST PAIN: ICD-10-CM

## 2024-02-21 DIAGNOSIS — G89.29 CHRONIC RIGHT SHOULDER PAIN: ICD-10-CM

## 2024-02-21 DIAGNOSIS — E78.2 MIXED HYPERLIPIDEMIA: ICD-10-CM

## 2024-02-21 DIAGNOSIS — R45.4 ANGER: ICD-10-CM

## 2024-02-21 DIAGNOSIS — E78.1 HYPERTRIGLYCERIDEMIA: ICD-10-CM

## 2024-02-21 DIAGNOSIS — E11.9 TYPE 2 DIABETES MELLITUS WITHOUT COMPLICATION, WITHOUT LONG-TERM CURRENT USE OF INSULIN (HCC): Primary | ICD-10-CM

## 2024-02-21 DIAGNOSIS — K22.10 EROSIVE ESOPHAGITIS: ICD-10-CM

## 2024-02-21 DIAGNOSIS — R80.9 MICROALBUMINURIA: ICD-10-CM

## 2024-02-21 DIAGNOSIS — F33.0 MILD EPISODE OF RECURRENT MAJOR DEPRESSIVE DISORDER (HCC): ICD-10-CM

## 2024-02-21 DIAGNOSIS — K21.9 GASTROESOPHAGEAL REFLUX DISEASE, UNSPECIFIED WHETHER ESOPHAGITIS PRESENT: ICD-10-CM

## 2024-02-21 DIAGNOSIS — R06.83 SNORING: ICD-10-CM

## 2024-02-21 DIAGNOSIS — M25.511 CHRONIC RIGHT SHOULDER PAIN: ICD-10-CM

## 2024-02-21 DIAGNOSIS — R53.83 FATIGUE, UNSPECIFIED TYPE: ICD-10-CM

## 2024-02-21 PROCEDURE — 99215 OFFICE O/P EST HI 40 MIN: CPT | Performed by: FAMILY MEDICINE

## 2024-02-21 RX ORDER — METHOCARBAMOL 500 MG/1
500 TABLET, FILM COATED ORAL 3 TIMES DAILY PRN
Qty: 30 TABLET | Refills: 0 | Status: SHIPPED | OUTPATIENT
Start: 2024-02-21

## 2024-02-21 RX ORDER — FAMOTIDINE 20 MG/1
20 TABLET, FILM COATED ORAL
Qty: 90 TABLET | Refills: 3 | Status: SHIPPED | OUTPATIENT
Start: 2024-02-21

## 2024-02-21 RX ORDER — METFORMIN HYDROCHLORIDE 500 MG/1
1000 TABLET, EXTENDED RELEASE ORAL 2 TIMES DAILY WITH MEALS
Qty: 360 TABLET | Refills: 3 | Status: SHIPPED | OUTPATIENT
Start: 2024-02-21

## 2024-02-21 RX ORDER — INSULIN ASPART 100 [IU]/ML
45 INJECTION, SOLUTION INTRAVENOUS; SUBCUTANEOUS
Qty: 15 ML | Refills: 5 | Status: SHIPPED | OUTPATIENT
Start: 2024-02-21 | End: 2024-02-22 | Stop reason: SDUPTHER

## 2024-02-21 RX ORDER — AMLODIPINE BESYLATE 5 MG/1
5 TABLET ORAL DAILY
Qty: 90 TABLET | Refills: 3 | Status: SHIPPED | OUTPATIENT
Start: 2024-02-21

## 2024-02-21 RX ORDER — FENOFIBRATE 200 MG/1
200 CAPSULE ORAL
Qty: 90 CAPSULE | Refills: 3 | Status: SHIPPED | OUTPATIENT
Start: 2024-02-21

## 2024-02-21 RX ORDER — INSULIN DEGLUDEC 200 U/ML
60 INJECTION, SOLUTION SUBCUTANEOUS EVERY 12 HOURS SCHEDULED
Qty: 27 ML | Refills: 5 | Status: SHIPPED | OUTPATIENT
Start: 2024-02-21 | End: 2024-02-22 | Stop reason: SDUPTHER

## 2024-02-21 RX ORDER — ROSUVASTATIN CALCIUM 20 MG/1
20 TABLET, COATED ORAL DAILY
Qty: 90 TABLET | Refills: 3 | Status: SHIPPED | OUTPATIENT
Start: 2024-02-21

## 2024-02-21 RX ORDER — OMEPRAZOLE 20 MG/1
20 CAPSULE, DELAYED RELEASE ORAL DAILY
Qty: 90 CAPSULE | Refills: 3 | Status: SHIPPED | OUTPATIENT
Start: 2024-02-21

## 2024-02-21 RX ORDER — FLUOXETINE HYDROCHLORIDE 20 MG/1
20 CAPSULE ORAL DAILY
Qty: 90 CAPSULE | Refills: 3 | Status: SHIPPED | OUTPATIENT
Start: 2024-02-21

## 2024-02-21 NOTE — PROGRESS NOTES
Assessment/Plan:       Problem List Items Addressed This Visit          Digestive    Gastroesophageal reflux disease    Relevant Medications    omeprazole (PriLOSEC) 20 mg delayed release capsule    famotidine (PEPCID) 20 mg tablet       Endocrine    Type 2 diabetes mellitus without complication, without long-term current use of insulin ([facility]) - Primary    Relevant Medications    metFORMIN (GLUCOPHAGE-XR) 500 mg 24 hr tablet    sitaGLIPtin (JANUVIA) 100 mg tablet    Other Relevant Orders    Comprehensive metabolic panel       Cardiovascular and Mediastinum    Primary hypertension    Relevant Medications    amLODIPine (NORVASC) 5 mg tablet       Other    Hyperlipidemia    Relevant Medications    rosuvastatin (CRESTOR) 20 MG tablet    fenofibrate micronized (LOFIBRA) 200 MG capsule    Mild episode of recurrent major depressive disorder (HCC)    Relevant Medications    FLUoxetine (PROzac) 20 mg capsule    Anger    Relevant Medications    FLUoxetine (PROzac) 20 mg capsule    Intermittent chest pain    Relevant Medications    famotidine (PEPCID) 20 mg tablet     Other Visit Diagnoses       Microalbuminuria        Hypertriglyceridemia        Relevant Medications    rosuvastatin (CRESTOR) 20 MG tablet    fenofibrate micronized (LOFIBRA) 200 MG capsule    Low libido        Relevant Medications    FLUoxetine (PROzac) 20 mg capsule    Other Relevant Orders    Ambulatory Referral to Endocrinology    Erosive esophagitis        Relevant Medications    omeprazole (PriLOSEC) 20 mg delayed release capsule    famotidine (PEPCID) 20 mg tablet    Fatigue, unspecified type        Relevant Orders    Ambulatory Referral to Sleep Medicine    Ambulatory Referral to Endocrinology    Snoring        Relevant Orders    Ambulatory Referral to Sleep Medicine    Chronic right shoulder pain        Relevant Medications    methocarbamol (ROBAXIN) 500 mg tablet              Split Tresiba 60 mg BID, continue Novolog with meals. Advised to try to eat  "smaller, more frequent meals. He declines further endocrinology follow-up would prefer to have diabetes managed here.   Next visit discuss adding ACE/ARB.   Referral to endocrinology for low testosterone.   Restart Prozac 20 mg daily.   Referral sleep medicine.   Close follow-up 4 weeks.     I have spent a total time of 45 minutes on 02/21/24 in caring for this patient including Diagnostic results, Risks and benefits of tx options, Instructions for management, Importance of tx compliance, Risk factor reductions, Impressions, Counseling / Coordination of care, Documenting in the medical record, Reviewing / ordering tests, medicine, procedures  , and Obtaining or reviewing history  .      Subjective:      Patient ID: Mehrdad Aponte is a 38 y.o. male.    HPI    His shoulder surgery was cancelled due to uncontrolled diabetes.     Diabetes- A1c 10.6. Novolog 45 units only with meals, Tresiba-200 120 units varies, metformin 500 mg BID 4 pills caused diarrhea. 247 fasting this morning, ate 6-7 PM last night, Took Novolog 45 Tresiba 120 units. Inconsistent eating/insulin schedule, usually eats one large meal.     Microalbuminuria- 43.     Hyperlipidemia- Chol 183, Tri 967, HDL 21, no LDL to review. Crestor 20 mg daily, fenofibrate 200 mg daily. He has not been taking.     Hypertension- Taking amlodipine 5 mg daily. High stress.     Low libido- Fatigue and libido decrease 1 year. Testosterone low normal for age free 9.9, total 293.     Depression and anger- Off Prozac now, struggling with this.     The following portions of the patient's history were reviewed and updated as appropriate: allergies, current medications, past family history, past medical history, past social history, past surgical history, and problem list.    Review of Systems   All other systems reviewed and are negative.        Objective:      /80   Pulse 101   Temp 97.8 °F (36.6 °C) (Temporal)   Resp 18   Ht 6' 2\" (1.88 m)   Wt 128 kg (281 lb 9.6 " oz)   SpO2 98%   BMI 36.16 kg/m²          Physical Exam  Vitals reviewed.   Constitutional:       General: He is not in acute distress.     Appearance: Normal appearance. He is not ill-appearing, toxic-appearing or diaphoretic.   HENT:      Head: Normocephalic and atraumatic.   Pulmonary:      Effort: Pulmonary effort is normal. No respiratory distress.   Skin:     General: Skin is warm.      Coloration: Skin is not pale.      Findings: No erythema or rash.   Neurological:      Mental Status: He is alert and oriented to person, place, and time.   Psychiatric:         Mood and Affect: Mood normal.         Behavior: Behavior normal.             Kavitha De Santiago DO  [facility]

## 2024-02-22 ENCOUNTER — PATIENT MESSAGE (OUTPATIENT)
Dept: FAMILY MEDICINE CLINIC | Facility: CLINIC | Age: 39
End: 2024-02-22

## 2024-02-22 DIAGNOSIS — E11.9 TYPE 2 DIABETES MELLITUS WITHOUT COMPLICATION, WITHOUT LONG-TERM CURRENT USE OF INSULIN (HCC): ICD-10-CM

## 2024-02-22 DIAGNOSIS — E11.9 TYPE 2 DIABETES MELLITUS WITHOUT COMPLICATION, WITHOUT LONG-TERM CURRENT USE OF INSULIN (HCC): Primary | ICD-10-CM

## 2024-02-22 RX ORDER — PEN NEEDLE, DIABETIC 32GX 5/32"
NEEDLE, DISPOSABLE MISCELLANEOUS 4 TIMES DAILY
Qty: 100 EACH | Refills: 5 | Status: SHIPPED | OUTPATIENT
Start: 2024-02-22

## 2024-02-22 RX ORDER — INSULIN ASPART 100 [IU]/ML
45 INJECTION, SOLUTION INTRAVENOUS; SUBCUTANEOUS
Qty: 135 ML | Refills: 3 | Status: SHIPPED | OUTPATIENT
Start: 2024-02-22

## 2024-02-22 RX ORDER — INSULIN DEGLUDEC 200 U/ML
60 INJECTION, SOLUTION SUBCUTANEOUS EVERY 12 HOURS SCHEDULED
Qty: 54 ML | Refills: 3 | Status: SHIPPED | OUTPATIENT
Start: 2024-02-22

## 2024-02-26 ENCOUNTER — TELEPHONE (OUTPATIENT)
Dept: FAMILY MEDICINE CLINIC | Facility: CLINIC | Age: 39
End: 2024-02-26

## 2024-03-05 LAB
ALBUMIN SERPL-MCNC: 4.7 G/DL (ref 3.5–5.7)
ALP SERPL-CCNC: 44 U/L (ref 35–120)
ALT SERPL-CCNC: 44 U/L
ANION GAP SERPL CALCULATED.3IONS-SCNC: 9 MMOL/L (ref 3–11)
AST SERPL-CCNC: 22 U/L
BILIRUB SERPL-MCNC: 0.4 MG/DL (ref 0.2–1)
BUN SERPL-MCNC: 13 MG/DL (ref 7–28)
CALCIUM SERPL-MCNC: 9.4 MG/DL (ref 8.5–10.1)
CHLORIDE SERPL-SCNC: 103 MMOL/L (ref 100–109)
CO2 SERPL-SCNC: 27 MMOL/L (ref 21–31)
CREAT SERPL-MCNC: 0.89 MG/DL (ref 0.53–1.3)
CYTOLOGY CMNT CVX/VAG CYTO-IMP: ABNORMAL
GFR/BSA.PRED SERPLBLD CYS-BASED-ARV: 111 ML/MIN/{1.73_M2}
GLUCOSE SERPL-MCNC: 179 MG/DL (ref 65–99)
POTASSIUM SERPL-SCNC: 4.3 MMOL/L (ref 3.5–5.2)
PROT SERPL-MCNC: 7.4 G/DL (ref 6.3–8.3)
SODIUM SERPL-SCNC: 139 MMOL/L (ref 135–145)

## 2024-03-10 PROBLEM — E78.1 HYPERTRIGLYCERIDEMIA: Status: ACTIVE | Noted: 2024-03-10

## 2024-03-10 PROBLEM — R80.9 MICROALBUMINURIA: Status: ACTIVE | Noted: 2024-03-10

## 2024-03-11 ENCOUNTER — OFFICE VISIT (OUTPATIENT)
Dept: FAMILY MEDICINE CLINIC | Facility: CLINIC | Age: 39
End: 2024-03-11
Payer: COMMERCIAL

## 2024-03-11 VITALS
WEIGHT: 282 LBS | SYSTOLIC BLOOD PRESSURE: 128 MMHG | RESPIRATION RATE: 18 BRPM | DIASTOLIC BLOOD PRESSURE: 70 MMHG | OXYGEN SATURATION: 98 % | TEMPERATURE: 98 F | HEIGHT: 74 IN | HEART RATE: 82 BPM | BODY MASS INDEX: 36.19 KG/M2

## 2024-03-11 DIAGNOSIS — E78.1 HYPERTRIGLYCERIDEMIA: ICD-10-CM

## 2024-03-11 DIAGNOSIS — R80.9 MICROALBUMINURIA: ICD-10-CM

## 2024-03-11 DIAGNOSIS — E11.9 TYPE 2 DIABETES MELLITUS WITHOUT COMPLICATION, WITHOUT LONG-TERM CURRENT USE OF INSULIN (HCC): Primary | ICD-10-CM

## 2024-03-11 DIAGNOSIS — R45.4 ANGER: ICD-10-CM

## 2024-03-11 DIAGNOSIS — E78.2 MIXED HYPERLIPIDEMIA: ICD-10-CM

## 2024-03-11 DIAGNOSIS — I10 PRIMARY HYPERTENSION: ICD-10-CM

## 2024-03-11 DIAGNOSIS — F33.0 MILD EPISODE OF RECURRENT MAJOR DEPRESSIVE DISORDER (HCC): ICD-10-CM

## 2024-03-11 PROCEDURE — 99214 OFFICE O/P EST MOD 30 MIN: CPT | Performed by: FAMILY MEDICINE

## 2024-03-11 RX ORDER — PEN NEEDLE, DIABETIC 32GX 5/32"
NEEDLE, DISPOSABLE MISCELLANEOUS 4 TIMES DAILY
Qty: 400 EACH | Refills: 5 | Status: SHIPPED | OUTPATIENT
Start: 2024-03-11

## 2024-03-11 NOTE — PROGRESS NOTES
"Assessment/Plan:       Problem List Items Addressed This Visit          Endocrine    Type 2 diabetes mellitus without complication, without long-term current use of insulin (HCC) - Primary    Relevant Medications    BD Pen Needle Deb U/F 32G X 4 MM MISC    Other Relevant Orders    Hemoglobin A1C    Comprehensive metabolic panel    CBC and differential    Albumin / creatinine urine ratio       Cardiovascular and Mediastinum    Primary hypertension       Other    Hyperlipidemia    Mild episode of recurrent major depressive disorder (HCC)    Anger    Microalbuminuria    Hypertriglyceridemia    Relevant Orders    Lipid Panel with Direct LDL reflex         Continue current regimen, counseled diet modification.  We discussed adding ACE/ARB versus monitoring, he would prefer to recheck next visit.   Continue current regimen otherwise.   Follow-up end of May, blood work prior.     Subjective:      Patient ID: Mehrdad Aponte is a 38 y.o. male.    HPI    Diabetes- Glucose improved on recent . His blood sugar has still been elevated, in the 200s, one fasting 180, did experience a couple of relative hypoglycemic symptoms, no hypoglycemic readings. Has not changed diet since last visit.     Microalbuminuria- Albumin/creatinine ratio 43.     Hypertension- Feeling well, no complaints or concerns.     Depression and anger- Last visit we restarted Prozac 20 mg daily. No feeling any different, but tolerating well.     The following portions of the patient's history were reviewed and updated as appropriate: allergies, current medications, past family history, past medical history, past social history, past surgical history, and problem list.    Review of Systems   All other systems reviewed and are negative.        Objective:      /70   Pulse 82   Temp 98 °F (36.7 °C) (Temporal)   Resp 18   Ht 6' 2\" (1.88 m)   Wt 128 kg (282 lb)   SpO2 98%   BMI 36.21 kg/m²          Physical Exam  Vitals reviewed.   Constitutional:  "      General: He is not in acute distress.     Appearance: Normal appearance. He is not ill-appearing, toxic-appearing or diaphoretic.   Pulmonary:      Effort: Pulmonary effort is normal. No respiratory distress.   Neurological:      Mental Status: He is alert and oriented to person, place, and time.   Psychiatric:         Mood and Affect: Mood normal.         Behavior: Behavior normal.             Kavitha De Santiago,   [facility]

## 2024-03-19 ENCOUNTER — CONSULT (OUTPATIENT)
Dept: ENDOCRINOLOGY | Facility: CLINIC | Age: 39
End: 2024-03-19
Payer: COMMERCIAL

## 2024-03-19 VITALS
SYSTOLIC BLOOD PRESSURE: 146 MMHG | BODY MASS INDEX: 36.96 KG/M2 | OXYGEN SATURATION: 98 % | HEIGHT: 74 IN | WEIGHT: 288 LBS | HEART RATE: 97 BPM | DIASTOLIC BLOOD PRESSURE: 78 MMHG

## 2024-03-19 DIAGNOSIS — E11.9 TYPE 2 DIABETES MELLITUS WITHOUT COMPLICATION, WITHOUT LONG-TERM CURRENT USE OF INSULIN (HCC): Primary | ICD-10-CM

## 2024-03-19 DIAGNOSIS — R68.82 LOW LIBIDO: ICD-10-CM

## 2024-03-19 DIAGNOSIS — R53.83 FATIGUE, UNSPECIFIED TYPE: ICD-10-CM

## 2024-03-19 DIAGNOSIS — E78.1 HYPERTRIGLYCERIDEMIA: ICD-10-CM

## 2024-03-19 DIAGNOSIS — N52.9 ERECTILE DYSFUNCTION, UNSPECIFIED ERECTILE DYSFUNCTION TYPE: ICD-10-CM

## 2024-03-19 PROCEDURE — 99214 OFFICE O/P EST MOD 30 MIN: CPT | Performed by: STUDENT IN AN ORGANIZED HEALTH CARE EDUCATION/TRAINING PROGRAM

## 2024-03-19 RX ORDER — BLOOD-GLUCOSE SENSOR
EACH MISCELLANEOUS
Qty: 2 EACH | Refills: 1 | Status: SHIPPED | OUTPATIENT
Start: 2024-03-19 | End: 2024-03-26 | Stop reason: CLARIF

## 2024-03-19 RX ORDER — PHENTERMINE HYDROCHLORIDE 15 MG/1
15 CAPSULE ORAL EVERY MORNING
Qty: 30 CAPSULE | Refills: 3 | Status: SHIPPED | OUTPATIENT
Start: 2024-03-19

## 2024-03-19 RX ORDER — SILDENAFIL 25 MG/1
25 TABLET, FILM COATED ORAL DAILY PRN
Qty: 10 TABLET | Refills: 0 | Status: SHIPPED | OUTPATIENT
Start: 2024-03-19

## 2024-03-21 ENCOUNTER — TELEPHONE (OUTPATIENT)
Age: 39
End: 2024-03-21

## 2024-03-21 NOTE — TELEPHONE ENCOUNTER
PA for phentermine 15 MG capsule     Submitted via    []CMM-KEY   [x][employer]-Case ID # [id number]   []Faxed to plan   []Other website   []Phone call Case ID #     Office notes sent, clinical questions answered. Awaiting determination    Turnaround time for your insurance to make a decision on your Prior Authorization can take 7-21 business days.

## 2024-03-22 PROBLEM — N52.9 ERECTILE DYSFUNCTION: Status: ACTIVE | Noted: 2024-03-22

## 2024-03-22 NOTE — ASSESSMENT & PLAN NOTE
Patient could benefit from AOM. He suffers from cravings and weight gain. Has prior experience with phentermine. Pro/cons reviewed. PDMP reviewed, no red flags. Start phentermine 15 mg daily. Re-assess in 3-mo

## 2024-03-22 NOTE — ASSESSMENT & PLAN NOTE
Uncontrolled. We discussed the pathophysiology of diabetes and its associations with negative health outcomes.  I counseled the patient on various goals of diabetes management, including healthy lifestyle and behaviors, glycemic targets, preventative health care, and the role of pharmacotherapies. No changes in therapy today due to lack of values. Will attempt Rx of CGM, which I feel would be beneficial for improving control in this insulin dependent diabetic. Needs optimization of diet and lifestyle. Will follow at near future visit

## 2024-03-22 NOTE — ASSESSMENT & PLAN NOTE
Will trial PDE5i. I do not suspect hypogonadism in this patient, and risk of TRT seems greater than benefit. Follow up SHBG testing.

## 2024-03-22 NOTE — PROGRESS NOTES
Mehrdad Aponte 38 y.o. male MRN: [id number]    Encounter: [id number]      Assessment/Plan     Problem List Items Addressed This Visit     Type 2 diabetes mellitus without complication, without long-term current use of insulin (HCC) - Primary     Uncontrolled. We discussed the pathophysiology of diabetes and its associations with negative health outcomes.  I counseled the patient on various goals of diabetes management, including healthy lifestyle and behaviors, glycemic targets, preventative health care, and the role of pharmacotherapies. No changes in therapy today due to lack of values. Will attempt Rx of CGM, which I feel would be beneficial for improving control in this insulin dependent diabetic. Needs optimization of diet and lifestyle. Will follow at near future visit           Relevant Medications    Continuous Blood Gluc Sensor (FreeStyle Shari 3 Sensor) MISC    Other Relevant Orders    Comprehensive metabolic panel    BMI 35.0-35.9,adult     Patient could benefit from AOM. He suffers from cravings and weight gain. Has prior experience with phentermine. Pro/cons reviewed. PDMP reviewed, no red flags. Start phentermine 15 mg daily. Re-assess in 3-mo         Relevant Medications    phentermine 15 MG capsule    Hypertriglyceridemia     Needs to optimize diet primarily.          Relevant Orders    Triglycerides    Erectile dysfunction     Will trial PDE5i. I do not suspect hypogonadism in this patient, and risk of TRT seems greater than benefit. Follow up SHBG testing.          Relevant Medications    sildenafil (VIAGRA) 25 MG tablet    Other Relevant Orders    Testosterone    Sex Hormone Binding Globulin    Luteinizing hormone    Prolactin    TSH, 3rd generation   Other Visit Diagnoses     Low libido        Relevant Medications    phentermine 15 MG capsule    Fatigue, unspecified type        Relevant Orders    Testosterone    Sex Hormone Binding Globulin    Luteinizing hormone    Prolactin    TSH, 3rd generation         RTC 6-weeks    CC: fatigue, diabetes    History of Present Illness     HPI:    Mehrdad presents today for endocrine evaluation of fatigue, diabetes. Had labs showing total testosterone 293 with normal free testosterone. Libido is low. Reports 8 year history of diabetes, poorly controlled. No DM complications. Has history of Tg induced pancreatitis. Presently taking tresiba 60 units q12h, novolog 45 units qAC, metformin 1g bid, and januvia. He does not like CBG testing. Reports am values 180 - 220. He feels crappy if 150-160s. He works as a  6-7d/week. He reports difficulty with food cravings, and mentions eating 5-6 cheeseburgers at a single meal. Cravings are a challenge. Cravings had been previously improved upon with phentermine, which also helped with weight. There is a fam history of early ASCVD in his father who was 45y at time of heart disease. Mehrdad has upcoming sleep evaluation.      Review of Systems   Constitutional:  Positive for fatigue and unexpected weight change.   Gastrointestinal:  Negative for nausea and vomiting.   Endocrine: Negative for polydipsia and polyuria.        +ED   All other systems reviewed and are negative.      Historical Information   Past Medical History:   Diagnosis Date   • Diabetes mellitus      History reviewed. No pertinent surgical history.  Social History   Social History     Substance and Sexual Activity   Alcohol Use Not Currently     Social History     Substance and Sexual Activity   Drug Use Not Currently     Social History     Tobacco Use   Smoking Status Former   • Types: E-Cigarettes   • Quit date:    • Years since quittin.2   Smokeless Tobacco Never     Family History:   Family History   Problem Relation Age of Onset   • Thyroid disease Mother    • Hypertension Father    • Hyperlipidemia Father    • Diabetes Father    • Thyroid disease Father    • Heart attack Father        Meds/Allergies   Current Outpatient Medications   Medication Sig Dispense  Refill   • amLODIPine (NORVASC) 5 mg tablet Take 1 tablet (5 mg total) by mouth daily 90 tablet 3   • BD Pen Needle Deb U/F 32G X 4 MM MISC Inject under the skin 4 (four) times a day 400 each 5   • Continuous Blood Gluc Sensor (FreeStyle Shari 3 Sensor) MISC E11.65 check blood sugars before and after meals 2 each 1   • famotidine (PEPCID) 20 mg tablet Take 1 tablet (20 mg total) by mouth daily at bedtime Take 2 hours prior to bed 90 tablet 3   • fenofibrate micronized (LOFIBRA) 200 MG capsule Take 1 capsule (200 mg total) by mouth daily with breakfast 90 capsule 3   • FLUoxetine (PROzac) 20 mg capsule Take 1 capsule (20 mg total) by mouth daily 90 capsule 3   • glucagon (GLUCAGEN) 1 mg injection 1 mg once     • metFORMIN (GLUCOPHAGE-XR) 500 mg 24 hr tablet Take 2 tablets (1,000 mg total) by mouth 2 (two) times a day with meals 360 tablet 3   • methocarbamol (ROBAXIN) 500 mg tablet Take 1 tablet (500 mg total) by mouth 3 (three) times a day as needed for muscle spasms 30 tablet 0   • NovoLOG FlexPen 100 units/mL injection pen Inject 45 Units under the skin 3 (three) times a day with meals 135 mL 3   • omeprazole (PriLOSEC) 20 mg delayed release capsule Take 1 capsule (20 mg total) by mouth daily Best to take 30 minutes to 1 hour before 1 meal a day 90 capsule 3   • phentermine 15 MG capsule Take 1 capsule (15 mg total) by mouth every morning 30 capsule 3   • rosuvastatin (CRESTOR) 20 MG tablet Take 1 tablet (20 mg total) by mouth daily 90 tablet 3   • sildenafil (VIAGRA) 25 MG tablet Take 1 tablet (25 mg total) by mouth daily as needed for erectile dysfunction 10 tablet 0   • sitaGLIPtin (JANUVIA) 100 mg tablet Take 1 tablet (100 mg total) by mouth daily 90 tablet 3   • Tresiba FlexTouch 200 units/mL CONCENTRATED U-200 injection pen Inject 60 Units under the skin every 12 (twelve) hours 54 mL 3     No current facility-administered medications for this visit.     No Known Allergies    Objective   Vitals: Blood  "pressure 146/78, pulse 97, height 6' 2\" (1.88 m), weight 131 kg (288 lb), SpO2 98%.    Physical Exam  Vitals reviewed.   Constitutional:       General: He is not in acute distress.     Appearance: Normal appearance.   HENT:      Head: Normocephalic and atraumatic.      Nose: Nose normal.   Eyes:      General: No scleral icterus.     Conjunctiva/sclera: Conjunctivae normal.   Pulmonary:      Effort: Pulmonary effort is normal. No respiratory distress.   Musculoskeletal:         General: No deformity.      Cervical back: Normal range of motion.   Skin:     General: Skin is warm and dry.   Neurological:      General: No focal deficit present.      Mental Status: He is alert.   Psychiatric:         Mood and Affect: Mood normal.         Behavior: Behavior normal.         The history was obtained from the review of the chart, patient.    Lab Results:   Lab Results   Component Value Date/Time    Hemoglobin A1C 10.6 (H) 02/14/2024 10:47 AM    Hemoglobin A1C 8.2 (H) 07/21/2023 06:57 AM    BUN 13 03/05/2024 06:21 AM    BUN 18 02/12/2024 09:56 AM    BUN 13 07/21/2023 06:57 AM    Potassium 4.3 03/05/2024 06:21 AM    Potassium 4.7 02/12/2024 09:56 AM    Potassium 4.2 07/21/2023 06:57 AM    Chloride 103 03/05/2024 06:21 AM    Chloride 96 (L) 02/12/2024 09:56 AM    Chloride 105 07/21/2023 06:57 AM    Carbon Dioxide 24 02/12/2024 09:56 AM    Carbon Dioxide 24 07/21/2023 06:57 AM    CO2 27 03/05/2024 06:21 AM    Creatinine 0.89 03/05/2024 06:21 AM    Creatinine 1.03 02/12/2024 09:56 AM    Creatinine 0.96 07/21/2023 06:57 AM    AST 22 03/05/2024 06:21 AM    AST 18 07/21/2023 06:57 AM    ALT 44 03/05/2024 06:21 AM    ALT 36 07/21/2023 06:57 AM    Protein, Total 7.4 03/05/2024 06:21 AM    Protein, Total 7.1 07/21/2023 06:57 AM    Albumin 4.7 03/05/2024 06:21 AM    ALBUMIN 4.5 07/21/2023 06:57 AM    HDL, Direct 21 (L) 02/14/2024 10:47 AM    Triglycerides 967 (H) 02/14/2024 10:47 AM       Component      Latest Ref Rng 2/17/2024 " "  TESTOSTERONE FREE      8.7 - 25.1 pg/mL 9.9    Testosterone, Total, LC/MS      264 - 916 ng/dL 293          Imaging Studies: I have personally reviewed pertinent reports.      Portions of the record may have been created with voice recognition software. Occasional wrong word or \"sound a like\" substitutions may have occurred due to the inherent limitations of voice recognition software. Read the chart carefully and recognize, using context, where substitutions have occurred.    "

## 2024-03-25 NOTE — TELEPHONE ENCOUNTER
PA for Phentermine Denied    Reason:(Screenshot if applicable)        Message sent to office clinical pool Yes    Denial letter scanned into Media Yes    Appeal started No

## 2024-03-26 ENCOUNTER — TELEPHONE (OUTPATIENT)
Age: 39
End: 2024-03-26

## 2024-03-26 DIAGNOSIS — E11.9 TYPE 2 DIABETES MELLITUS WITHOUT COMPLICATION, WITHOUT LONG-TERM CURRENT USE OF INSULIN (HCC): Primary | ICD-10-CM

## 2024-03-26 RX ORDER — ACYCLOVIR 400 MG/1
1 TABLET ORAL
Qty: 3 EACH | Refills: 3 | Status: SHIPPED | OUTPATIENT
Start: 2024-03-26

## 2024-03-26 NOTE — TELEPHONE ENCOUNTER
PA requested for Freestyle shari sensor. Insurance is requesting change to [employer]. If there is any reason this is not appropriate please send back documentation as to why so the PA team can initiate PA of Freestyle Shari

## 2024-04-26 ENCOUNTER — APPOINTMENT (OUTPATIENT)
Dept: LAB | Facility: CLINIC | Age: 39
End: 2024-04-26
Payer: COMMERCIAL

## 2024-04-26 DIAGNOSIS — E78.1 HYPERTRIGLYCERIDEMIA: ICD-10-CM

## 2024-04-26 DIAGNOSIS — R53.83 FATIGUE, UNSPECIFIED TYPE: ICD-10-CM

## 2024-04-26 DIAGNOSIS — N52.9 ERECTILE DYSFUNCTION, UNSPECIFIED ERECTILE DYSFUNCTION TYPE: ICD-10-CM

## 2024-04-26 DIAGNOSIS — E11.9 TYPE 2 DIABETES MELLITUS WITHOUT COMPLICATION, WITHOUT LONG-TERM CURRENT USE OF INSULIN (HCC): ICD-10-CM

## 2024-04-26 LAB
ALBUMIN SERPL BCP-MCNC: 4.6 G/DL (ref 3.5–5)
ALP SERPL-CCNC: 49 U/L (ref 34–104)
ALT SERPL W P-5'-P-CCNC: 47 U/L (ref 7–52)
ANION GAP SERPL CALCULATED.3IONS-SCNC: 7 MMOL/L (ref 4–13)
AST SERPL W P-5'-P-CCNC: 23 U/L (ref 13–39)
BILIRUB SERPL-MCNC: 0.4 MG/DL (ref 0.2–1)
BUN SERPL-MCNC: 14 MG/DL (ref 5–25)
CALCIUM SERPL-MCNC: 9.3 MG/DL (ref 8.4–10.2)
CHLORIDE SERPL-SCNC: 103 MMOL/L (ref 96–108)
CO2 SERPL-SCNC: 26 MMOL/L (ref 21–32)
CREAT SERPL-MCNC: 0.91 MG/DL (ref 0.6–1.3)
GFR SERPL CREATININE-BSD FRML MDRD: 105 ML/MIN/1.73SQ M
GLUCOSE P FAST SERPL-MCNC: 206 MG/DL (ref 65–99)
LH SERPL-ACNC: 6.2 MIU/ML
POTASSIUM SERPL-SCNC: 4.3 MMOL/L (ref 3.5–5.3)
PROLACTIN SERPL-MCNC: 9.08 NG/ML
PROT SERPL-MCNC: 7.4 G/DL (ref 6.4–8.4)
SODIUM SERPL-SCNC: 136 MMOL/L (ref 135–147)
TESTOST SERPL-MSCNC: 235 NG/DL
TRIGL SERPL-MCNC: 305 MG/DL
TSH SERPL DL<=0.05 MIU/L-ACNC: 3.61 UIU/ML (ref 0.45–4.5)

## 2024-04-26 PROCEDURE — 84270 ASSAY OF SEX HORMONE GLOBUL: CPT

## 2024-04-26 PROCEDURE — 36415 COLL VENOUS BLD VENIPUNCTURE: CPT

## 2024-04-26 PROCEDURE — 84478 ASSAY OF TRIGLYCERIDES: CPT

## 2024-04-26 PROCEDURE — 80053 COMPREHEN METABOLIC PANEL: CPT

## 2024-04-26 PROCEDURE — 84403 ASSAY OF TOTAL TESTOSTERONE: CPT

## 2024-04-26 PROCEDURE — 84146 ASSAY OF PROLACTIN: CPT

## 2024-04-26 PROCEDURE — 84443 ASSAY THYROID STIM HORMONE: CPT

## 2024-04-26 PROCEDURE — 83002 ASSAY OF GONADOTROPIN (LH): CPT

## 2024-04-27 LAB — SHBG SERPL-SCNC: 15.7 NMOL/L (ref 16.5–55.9)

## 2024-05-02 ENCOUNTER — OFFICE VISIT (OUTPATIENT)
Dept: ENDOCRINOLOGY | Facility: CLINIC | Age: 39
End: 2024-05-02
Payer: COMMERCIAL

## 2024-05-02 VITALS
DIASTOLIC BLOOD PRESSURE: 78 MMHG | BODY MASS INDEX: 35.55 KG/M2 | SYSTOLIC BLOOD PRESSURE: 130 MMHG | WEIGHT: 277 LBS | HEIGHT: 74 IN | HEART RATE: 82 BPM

## 2024-05-02 DIAGNOSIS — E78.1 HYPERTRIGLYCERIDEMIA: ICD-10-CM

## 2024-05-02 DIAGNOSIS — E11.9 TYPE 2 DIABETES MELLITUS WITHOUT COMPLICATION, WITHOUT LONG-TERM CURRENT USE OF INSULIN (HCC): Primary | ICD-10-CM

## 2024-05-02 DIAGNOSIS — N52.9 ERECTILE DYSFUNCTION, UNSPECIFIED ERECTILE DYSFUNCTION TYPE: ICD-10-CM

## 2024-05-02 DIAGNOSIS — I10 PRIMARY HYPERTENSION: ICD-10-CM

## 2024-05-02 DIAGNOSIS — E66.01 CLASS 2 SEVERE OBESITY DUE TO EXCESS CALORIES WITH SERIOUS COMORBIDITY AND BODY MASS INDEX (BMI) OF 35.0 TO 35.9 IN ADULT (HCC): ICD-10-CM

## 2024-05-02 DIAGNOSIS — Z87.19 HISTORY OF PANCREATITIS: ICD-10-CM

## 2024-05-02 PROBLEM — E66.812 CLASS 2 SEVERE OBESITY DUE TO EXCESS CALORIES WITH SERIOUS COMORBIDITY AND BODY MASS INDEX (BMI) OF 35.0 TO 35.9 IN ADULT (HCC): Status: ACTIVE | Noted: 2022-11-08

## 2024-05-02 PROCEDURE — 3075F SYST BP GE 130 - 139MM HG: CPT | Performed by: STUDENT IN AN ORGANIZED HEALTH CARE EDUCATION/TRAINING PROGRAM

## 2024-05-02 PROCEDURE — 3078F DIAST BP <80 MM HG: CPT | Performed by: STUDENT IN AN ORGANIZED HEALTH CARE EDUCATION/TRAINING PROGRAM

## 2024-05-02 PROCEDURE — 99214 OFFICE O/P EST MOD 30 MIN: CPT | Performed by: STUDENT IN AN ORGANIZED HEALTH CARE EDUCATION/TRAINING PROGRAM

## 2024-05-02 PROCEDURE — 95251 CONT GLUC MNTR ANALYSIS I&R: CPT | Performed by: STUDENT IN AN ORGANIZED HEALTH CARE EDUCATION/TRAINING PROGRAM

## 2024-05-02 RX ORDER — PHENTERMINE HYDROCHLORIDE 15 MG/1
15 CAPSULE ORAL EVERY MORNING
Qty: 90 CAPSULE | Refills: 1 | Status: SHIPPED | OUTPATIENT
Start: 2024-05-02

## 2024-05-02 RX ORDER — ACYCLOVIR 400 MG/1
1 TABLET ORAL
Qty: 9 EACH | Refills: 1 | Status: SHIPPED | OUTPATIENT
Start: 2024-05-02

## 2024-05-02 NOTE — PROGRESS NOTES
Mehrdad Aponte 38 y.o. male MRN: [id number]    Encounter: [id number]      Assessment/Plan     Problem List Items Addressed This Visit     Type 2 diabetes mellitus without complication, without long-term current use of insulin ([facility]) - Primary     Improving control by CGM. Mehrdad will continue current therapy plan. I provided guidance for managing LA insulin for fasting blood sugar ranges. I hope that with lifesytle interventions he may realize improvements in control with a reduction of insulin requirements. WE will reasess control in 3-mo interval.            Relevant Medications    Continuous Glucose Sensor (Dexcom G7 Sensor)    Class 2 severe obesity due to excess calories with serious comorbidity and body mass index (BMI) of 35.0 to 35.9 in adult ([facility])     Mehrdad has had an 11 lb weight loss since last visit, which is excellent. He will remain on phentermine at present Rx. He has follow up with sleep medicine forthcoming         Relevant Medications    phentermine 15 MG capsule    Primary hypertension     Fair control. Will monitor closely. Advised ambulatory BP monitoring on phentermine         Hypertriglyceridemia     Improving, not at goal. Optimization of diabetes and diet recommended. Patient may continue statin and fibrate         Erectile dysfunction     There is low SHBG, which is causing low total testosterone measurements. Free testosterone calculated by SHBG is in normal range for age         History of pancreatitis     This was due to high triglycerides. Would defer consideration for GLP at present. Optimization of high triglycerides is recommended            RTC 3-months    CC: fatigue, diabetes    History of Present Illness     HPI:    Mehrdad returns today in follow up of diabetes and testosterone. He is joined by his SO.    Mehrdad is feeling will. He is seeing improvements in blood sugar with CGM monitoring, and feels comfortable with his treatment plan. He is tolerating phentermine, which has been helpful  in cravings. He has lost about 11 lb weight since last visit. He denies any hyperglycemic symptoms or hypoglycemia.     Review of Systems   Constitutional:  Negative for fatigue and unexpected weight change.   Gastrointestinal:  Negative for nausea and vomiting.   Endocrine: Negative for polydipsia and polyuria.        +ED   All other systems reviewed and are negative.      Historical Information   Past Medical History:   Diagnosis Date   • Diabetes mellitus      History reviewed. No pertinent surgical history.  Social History   Social History     Substance and Sexual Activity   Alcohol Use Not Currently     Social History     Substance and Sexual Activity   Drug Use Not Currently     Social History     Tobacco Use   Smoking Status Former   • Types: E-Cigarettes   • Quit date:    • Years since quittin.3   Smokeless Tobacco Never     Family History:   Family History   Problem Relation Age of Onset   • Thyroid disease Mother    • Hypertension Father    • Hyperlipidemia Father    • Diabetes Father    • Thyroid disease Father    • Heart attack Father        Meds/Allergies   Current Outpatient Medications   Medication Sig Dispense Refill   • amLODIPine (NORVASC) 5 mg tablet Take 1 tablet (5 mg total) by mouth daily 90 tablet 3   • BD Pen Needle Deb U/F 32G X 4 MM MISC Inject under the skin 4 (four) times a day 400 each 5   • Continuous Glucose Sensor (Dexcom G7 Sensor) Use 1 Device every 10 days E11.65 use 1 device every 10 days 9 each 1   • famotidine (PEPCID) 20 mg tablet Take 1 tablet (20 mg total) by mouth daily at bedtime Take 2 hours prior to bed 90 tablet 3   • fenofibrate micronized (LOFIBRA) 200 MG capsule Take 1 capsule (200 mg total) by mouth daily with breakfast 90 capsule 3   • FLUoxetine (PROzac) 20 mg capsule Take 1 capsule (20 mg total) by mouth daily 90 capsule 3   • glucagon (GLUCAGEN) 1 mg injection 1 mg once     • metFORMIN (GLUCOPHAGE-XR) 500 mg 24 hr tablet Take 2 tablets (1,000 mg total)  "by mouth 2 (two) times a day with meals 360 tablet 3   • methocarbamol (ROBAXIN) 500 mg tablet Take 1 tablet (500 mg total) by mouth 3 (three) times a day as needed for muscle spasms 30 tablet 0   • NovoLOG FlexPen 100 units/mL injection pen Inject 45 Units under the skin 3 (three) times a day with meals 135 mL 3   • omeprazole (PriLOSEC) 20 mg delayed release capsule Take 1 capsule (20 mg total) by mouth daily Best to take 30 minutes to 1 hour before 1 meal a day 90 capsule 3   • phentermine 15 MG capsule Take 1 capsule (15 mg total) by mouth every morning 90 capsule 1   • rosuvastatin (CRESTOR) 20 MG tablet Take 1 tablet (20 mg total) by mouth daily 90 tablet 3   • sildenafil (VIAGRA) 25 MG tablet Take 1 tablet (25 mg total) by mouth daily as needed for erectile dysfunction 10 tablet 0   • sitaGLIPtin (JANUVIA) 100 mg tablet Take 1 tablet (100 mg total) by mouth daily 90 tablet 3   • Tresiba FlexTouch 200 units/mL CONCENTRATED U-200 injection pen Inject 60 Units under the skin every 12 (twelve) hours 54 mL 3     No current facility-administered medications for this visit.     No Known Allergies    Objective   Vitals: Blood pressure 130/78, pulse 82, height 6' 2\" (1.88 m), weight 126 kg (277 lb).    Physical Exam  Vitals reviewed.   Constitutional:       General: He is not in acute distress.     Appearance: Normal appearance.   HENT:      Head: Normocephalic and atraumatic.      Nose: Nose normal.   Eyes:      General: No scleral icterus.     Conjunctiva/sclera: Conjunctivae normal.   Pulmonary:      Effort: Pulmonary effort is normal. No respiratory distress.   Musculoskeletal:         General: No deformity.      Cervical back: Normal range of motion.   Skin:     General: Skin is warm and dry.   Neurological:      General: No focal deficit present.      Mental Status: He is alert.   Psychiatric:         Mood and Affect: Mood normal.         Behavior: Behavior normal.         The history was obtained from the " review of the chart, patient.    Lab Results:   Lab Results   Component Value Date/Time    Hemoglobin A1C 10.6 (H) 02/14/2024 10:47 AM    Hemoglobin A1C 8.2 (H) 07/21/2023 06:57 AM    BUN 14 04/26/2024 08:59 AM    BUN 13 03/05/2024 06:21 AM    BUN 18 02/12/2024 09:56 AM    BUN 13 07/21/2023 06:57 AM    Potassium 4.3 04/26/2024 08:59 AM    Potassium 4.3 03/05/2024 06:21 AM    Potassium 4.7 02/12/2024 09:56 AM    Potassium 4.2 07/21/2023 06:57 AM    Chloride 103 04/26/2024 08:59 AM    Chloride 103 03/05/2024 06:21 AM    Chloride 96 (L) 02/12/2024 09:56 AM    Chloride 105 07/21/2023 06:57 AM    Carbon Dioxide 24 02/12/2024 09:56 AM    Carbon Dioxide 24 07/21/2023 06:57 AM    CO2 26 04/26/2024 08:59 AM    CO2 27 03/05/2024 06:21 AM    Creatinine 0.91 04/26/2024 08:59 AM    Creatinine 0.89 03/05/2024 06:21 AM    Creatinine 1.03 02/12/2024 09:56 AM    Creatinine 0.96 07/21/2023 06:57 AM    AST 23 04/26/2024 08:59 AM    AST 22 03/05/2024 06:21 AM    AST 18 07/21/2023 06:57 AM    ALT 47 04/26/2024 08:59 AM    ALT 44 03/05/2024 06:21 AM    ALT 36 07/21/2023 06:57 AM    Total Protein 7.4 04/26/2024 08:59 AM    Protein, Total 7.4 03/05/2024 06:21 AM    Protein, Total 7.1 07/21/2023 06:57 AM    Albumin 4.6 04/26/2024 08:59 AM    Albumin 4.7 03/05/2024 06:21 AM    ALBUMIN 4.5 07/21/2023 06:57 AM    HDL, Direct 21 (L) 02/14/2024 10:47 AM    Triglycerides 305 (H) 04/26/2024 08:59 AM    Triglycerides 967 (H) 02/14/2024 10:47 AM     Component      Latest Ref Rng 4/26/2024   Sodium      135 - 147 mmol/L 136    Potassium      3.5 - 5.3 mmol/L 4.3    Chloride      96 - 108 mmol/L 103    Carbon Dioxide      21 - 32 mmol/L 26    ANION GAP      4 - 13 mmol/L 7    BUN      5 - 25 mg/dL 14    Creatinine      0.60 - 1.30 mg/dL 0.91    GLUCOSE, FASTING      65 - 99 mg/dL 206 (H)    Calcium      8.4 - 10.2 mg/dL 9.3    AST      13 - 39 U/L 23    ALT      7 - 52 U/L 47    ALK PHOS      34 - 104 U/L 49    Total Protein      6.4 - 8.4 g/dL 7.4   "  Albumin      3.5 - 5.0 g/dL 4.6    Total Bilirubin      0.20 - 1.00 mg/dL 0.40    GFR, Calculated      ml/min/1.73sq m 105    TESTOSTERONE TOTAL      175 - 781 ng/dL ng/dL 235    SEX HORMONE BINDING GLOBULIN      16.5 - 55.9 nmol/L 15.7 (L)    LUTEINIZING HORMONE      1.2 - 8.6 mIU/mL 6.2    PROLACTIN      2.64 - 13.13 ng/mL 9.08    TSH 3RD GENERATON      0.450 - 4.500 uIU/mL 3.606    Triglycerides      See Comment mg/dL 305 (H)             Mehrdad Aponte   Device used DEXCOM  Home use     Indication   Type 2 Diabetes    More than 72 hours of data was reviewed. Report to be scanned to chart.     Date Range: Apr 19 - May 2, 2024    Analysis of data:   % time CGM used: 93%  Average Glucose: 213 mg/dL  SD : 46 mg/dL   Time in Target Range: 22%   Time Above Range: 78% (20% very high)  Time Below Range: 0%     Interpretation of data: interval improvement in glycemic control. Blood sugars improving throughout report period. No hypoglycemia. Continue current strategy.       Imaging Studies: I have personally reviewed pertinent reports.      Portions of the record may have been created with voice recognition software. Occasional wrong word or \"sound a like\" substitutions may have occurred due to the inherent limitations of voice recognition software. Read the chart carefully and recognize, using context, where substitutions have occurred.    "

## 2024-05-02 NOTE — ASSESSMENT & PLAN NOTE
This was due to high triglycerides. Would defer consideration for GLP at present. Optimization of high triglycerides is recommended

## 2024-05-02 NOTE — ASSESSMENT & PLAN NOTE
Mehrdad has had an 11 lb weight loss since last visit, which is excellent. He will remain on phentermine at present Rx. He has follow up with sleep medicine forthcoming

## 2024-05-02 NOTE — ASSESSMENT & PLAN NOTE
Improving, not at goal. Optimization of diabetes and diet recommended. Patient may continue statin and fibrate

## 2024-05-02 NOTE — ASSESSMENT & PLAN NOTE
Improving control by CGM. Mehrdad will continue current therapy plan. I provided guidance for managing LA insulin for fasting blood sugar ranges. I hope that with lifesytle interventions he may realize improvements in control with a reduction of insulin requirements. WE will reasess control in 3-mo interval.

## 2024-05-02 NOTE — ASSESSMENT & PLAN NOTE
There is low SHBG, which is causing low total testosterone measurements. Free testosterone calculated by SHBG is in normal range for age

## 2024-05-23 ENCOUNTER — OFFICE VISIT (OUTPATIENT)
Dept: SLEEP CENTER | Facility: CLINIC | Age: 39
End: 2024-05-23
Payer: COMMERCIAL

## 2024-05-23 VITALS
HEIGHT: 74 IN | DIASTOLIC BLOOD PRESSURE: 74 MMHG | BODY MASS INDEX: 36.32 KG/M2 | HEART RATE: 83 BPM | SYSTOLIC BLOOD PRESSURE: 135 MMHG | WEIGHT: 283 LBS

## 2024-05-23 DIAGNOSIS — R53.83 FATIGUE, UNSPECIFIED TYPE: ICD-10-CM

## 2024-05-23 DIAGNOSIS — R06.83 SNORING: ICD-10-CM

## 2024-05-23 DIAGNOSIS — G47.19 EXCESSIVE DAYTIME SLEEPINESS: ICD-10-CM

## 2024-05-23 DIAGNOSIS — G47.39 SLEEP APNEA-LIKE BEHAVIOR: Primary | ICD-10-CM

## 2024-05-23 DIAGNOSIS — I10 PRIMARY HYPERTENSION: ICD-10-CM

## 2024-05-23 PROCEDURE — 99245 OFF/OP CONSLTJ NEW/EST HI 55: CPT | Performed by: NURSE PRACTITIONER

## 2024-05-23 NOTE — PROGRESS NOTES
Consultation - [facility]  Mehrdad Aponte, 1985, MRN: [id number]    5/23/2024        Reason for Consult / Principal Problem:    Evaluation of possible Obstructive Sleep Apnea  Excessive daytime sleepiness         Thank you for the opportunity of participating in the evaluation and care of this patient in the Sleep Clinic at [facility].      Subjective:     HPI: Mehrdad Aponte is a 38 y.o. year old male.  He presents for a consultation regarding concern of possible obstructive sleep apnea.  He reports loud snoring, gasping and periods of witnessed apnea.  He does not feel refreshed when awakening and feels sleepy all day, no matter how much sleep he had at night.    Comorbid conditions:  Obesity  HTN    Pertinent symptoms:  snoring, nocturnal gasping, witnessed apneas, neck circumference >18 inches-male;>17 inches female, excessive daytime sleepiness, [place] 13, unrefreshing sleep, impaired concentration or memory, and parasomnia (sleep talking, moving arms)      Sleep Study Results:  no prior sleep study      Employment:  he currently works full time between the hours of 7:00am-3:00pm M-Saturday     Sleep Schedule:       Bedtime:  9:00-10:00pm on work days and 10:00-11:00pm on days off      Latency:  usually within less than 10 minutes      Wakeup time:  5:00-6:00am on work days and 6:00-7:00am on weekends    Awakenings:       Frequency:  1-2 times per night      Causes:  for urination      Duration:  usually returns to sleep without difficulty    Daytime Sleepiness / Inappropriate Sleep:       Most severe:  he does not feel rested when waking up and feels sleepy throughout the day       Naps :  he naps at work 3 times per week      Time:  11:00-noon, sometimes as early as 8:00am      Duration:  1-2 hours     Naps are not refreshing in quality, but feels better      Inappropriate drowsiness / sleep:  he dozes easily when sedentary, he does not feel sleepy  when driving and even when riding as a passenger    Snoring:  he snores loudly    Apnea:  he has had witnessed apnea    Change in Weight:  he intentionally lost 15 lbs over the past few months with use of phentermine    Restless Leg Syndrome:  no clinical symptoms consistent with this diagnosis     Other Complaints:  He has been reported to talk in his sleep. He walked in his sleep as a child with no recent episodes.  Family members report talking to him in the kitchen during the night.  He appears to be awake and engages in conversation however, he does not recall the events the following day.  No reports of sleep paralysis or hallucinations surrounding sleep.  He does not wake up with headaches.  He reports bruxism and does not use an oral appliance.  He reports acting out of dreams including grabbing at something he is dreaming about.  He reports this is occasional and has been occurring for years.  He doesn't get out of bed during these events.     Social History:      Caffeine:  36 ounces of diet mountain dew and monster energy drinks daily       Tobacco:   reports that he quit smoking about 6 years ago. His smoking use included e-cigarettes. He has never used smokeless tobacco.     E-cig/Vaping:    E-Cigarette/Vaping    E-Cigarette Use Current Every Day User       E-Cigarette/Vaping Substances    Nicotine Yes     THC No     CBD No     Flavoring Yes     Other No     Unknown No          Alcohol:   reports that he does not currently use alcohol. none      Drugs:   reports that he does not currently use drugs.          The review of systems and following portions of the patient's history were reviewed and updated as appropriate: allergies, current medications, past family history, past medical history, past social history, past surgical history and problem list.        Objective:  Recent lab results:  2/12/24 - RBCs 4.46, H/H 13.9/38.5  2/14/24 - A1C - 10.6  CO2 ranges from 24-27      Vitals:    05/23/24 1419  "  BP: 135/74   BP Location: Left arm   Patient Position: Sitting   Cuff Size: Large   Pulse: 83   Weight: 128 kg (283 lb)   Height: 6' 2\" (1.88 m)     Body mass index is 36.34 kg/m².  Neck Circumference: 18  [place] Sleepiness Scale: Total score: 13      Current Outpatient Medications:     amLODIPine (NORVASC) 5 mg tablet, Take 1 tablet (5 mg total) by mouth daily, Disp: 90 tablet, Rfl: 3    BD Pen Needle Deb U/F 32G X 4 MM MISC, Inject under the skin 4 (four) times a day, Disp: 400 each, Rfl: 5    Continuous Glucose Sensor (Dexcom G7 Sensor), Use 1 Device every 10 days E11.65 use 1 device every 10 days, Disp: 9 each, Rfl: 1    famotidine (PEPCID) 20 mg tablet, Take 1 tablet (20 mg total) by mouth daily at bedtime Take 2 hours prior to bed, Disp: 90 tablet, Rfl: 3    fenofibrate micronized (LOFIBRA) 200 MG capsule, Take 1 capsule (200 mg total) by mouth daily with breakfast, Disp: 90 capsule, Rfl: 3    FLUoxetine (PROzac) 20 mg capsule, Take 1 capsule (20 mg total) by mouth daily, Disp: 90 capsule, Rfl: 3    glucagon (GLUCAGEN) 1 mg injection, 1 mg once, Disp: , Rfl:     metFORMIN (GLUCOPHAGE-XR) 500 mg 24 hr tablet, Take 2 tablets (1,000 mg total) by mouth 2 (two) times a day with meals, Disp: 360 tablet, Rfl: 3    methocarbamol (ROBAXIN) 500 mg tablet, Take 1 tablet (500 mg total) by mouth 3 (three) times a day as needed for muscle spasms, Disp: 30 tablet, Rfl: 0    NovoLOG FlexPen 100 units/mL injection pen, Inject 45 Units under the skin 3 (three) times a day with meals, Disp: 135 mL, Rfl: 3    omeprazole (PriLOSEC) 20 mg delayed release capsule, Take 1 capsule (20 mg total) by mouth daily Best to take 30 minutes to 1 hour before 1 meal a day, Disp: 90 capsule, Rfl: 3    phentermine 15 MG capsule, Take 1 capsule (15 mg total) by mouth every morning, Disp: 90 capsule, Rfl: 1    rosuvastatin (CRESTOR) 20 MG tablet, Take 1 tablet (20 mg total) by mouth daily, Disp: 90 tablet, Rfl: 3    sildenafil (VIAGRA) 25 MG " tablet, Take 1 tablet (25 mg total) by mouth daily as needed for erectile dysfunction, Disp: 10 tablet, Rfl: 0    sitaGLIPtin (JANUVIA) 100 mg tablet, Take 1 tablet (100 mg total) by mouth daily, Disp: 90 tablet, Rfl: 3    Tresiba FlexTouch 200 units/mL CONCENTRATED U-200 injection pen, Inject 60 Units under the skin every 12 (twelve) hours, Disp: 54 mL, Rfl: 3    Physical Exam  General Appearance:   Alert, cooperative, no distress, appears stated age, obese     Head:   Normocephalic, without obvious abnormality, atraumatic     Eyes:   Conjunctiva/corneas clear          Nose:  Nares normal, septum midline, mucosa normal, no drainage or sinus tenderness           Throat:  Lips, teeth and gums normal; tongue large in size and midline in position; mucosa moist with low-lying soft palatal tissue, uvula thick, mildly erythematous, tonsils not visualized, Mallampati class 3-4       Neck:  Supple, short, thick, symmetrical, trachea midline, no adenopathy; no thyromegaly noted, no carotid bruit or JVD     Lungs:      Clear to auscultation bilaterally, respirations unlabored     Heart:   Regular rate and rhythm, S1 and S2 normal, no murmur, rub or gallop       Extremities:  Extremities normal, atraumatic, no cyanosis or edema       Skin:  Skin color, texture, turgor normal, no rashes or lesions       Neurologic:  No focal deficits noted.          ASSESSMENT / PLAN     1. Sleep apnea-like behavior  Home Study      2. Fatigue, unspecified type  Ambulatory Referral to Sleep Medicine    Home Study      3. Excessive daytime sleepiness        4. Snoring  Ambulatory Referral to Sleep Medicine    Home Study      5. Primary hypertension  Home Study            Counseling / Coordination of Care  I have spent a total time of 55 minutes on 05/23/24 in caring for this patient including Risks and benefits of tx options, Instructions for management, Patient and family education, Importance of tx compliance, Risk factor reductions,  Impressions, Counseling / Coordination of care, Documenting in the medical record, Reviewing / ordering tests, medicine, procedures  , and Obtaining or reviewing history  .    Today we discussed the anatomy and physiology of the upper airway.  I pointed out how changes in this region can result in both snoring and abnormal breathing events including apneas and hypopneas.  I explained the most common co-morbidities of untreated sleep apnea.  After this we talked about some forms of treatment including application of positive airway pressure, mandibular advancement devices and surgery.  He would consider use of PAP therapy, if DINA is confirmed.  he would likely do best to start with a full face mask.    In order to evaluate the possibility of Obstructive Sleep Apnea as a cause of the patient's symptoms, a home sleep study will be completed to identify the presence or absence of abnormal nocturnal breathing.  If significant abnormal nocturnal breathing is detected, nasal CPAP will be titrated to find the optimum pressure needed to maintain upper airway patency during sleep.  This may be accomplished using APAP or may require an in lab titration study.  Following testing, the patient will return to the [facility] Center for set up of CPAP equipment with follow up visit to review compliance and effectiveness of treatment 31-91 days after set up.       The following instructions have been given to the patient today:    Patient Instructions    Schedule home sleep study  After testing, the nurse will call with results and recommendations for plan of treatment         Nursing Support:  When: Monday through Friday 7A-5PM except holidays  Where: Our direct line is [phone].    If you are having a true emergency please call 911.  In the event that the line is busy or it is after hours please leave a voice message and we will return your call.  Please speak clearly, leaving your full name, birth date, best number to  "reach you and the reason for your call.   Medication refills: We will need the name of the medication, the dosage, the ordering provider, whether you get a 30 or 90 day refill, and the pharmacy name and address.  Medications will be ordered by the provider only.  Nurses cannot call in prescriptions.  Please allow 7 days for medication refills.  Physician requested updates: If your provider requested that you call with an update after starting medication, please be ready to provide us the medication and dosage, what time you take your medication, the time you attempt to fall asleep, time you fall asleep, when you wake up, and what time you get out of bed.  Sleep Study Results: We will contact you with sleep study results and/or next steps after the physician has reviewed your testing.      FORREST Vieira  [facility]      Portions of the record may have been created with voice recognition software. Occasional wrong word or \"sound a like\" substitutions may have occurred due to the inherent limitations of voice recognition software. Read the chart carefully and recognize, using context, where substitutions have occurred.               "

## 2024-05-23 NOTE — PATIENT INSTRUCTIONS
Schedule home sleep study  After testing, the nurse will call with results and recommendations for plan of treatment         Nursing Support:  When: Monday through Friday 7A-5PM except holidays  Where: Our direct line is [phone].    If you are having a true emergency please call 911.  In the event that the line is busy or it is after hours please leave a voice message and we will return your call.  Please speak clearly, leaving your full name, birth date, best number to reach you and the reason for your call.   Medication refills: We will need the name of the medication, the dosage, the ordering provider, whether you get a 30 or 90 day refill, and the pharmacy name and address.  Medications will be ordered by the provider only.  Nurses cannot call in prescriptions.  Please allow 7 days for medication refills.  Physician requested updates: If your provider requested that you call with an update after starting medication, please be ready to provide us the medication and dosage, what time you take your medication, the time you attempt to fall asleep, time you fall asleep, when you wake up, and what time you get out of bed.  Sleep Study Results: We will contact you with sleep study results and/or next steps after the physician has reviewed your testing.

## 2024-05-28 ENCOUNTER — OFFICE VISIT (OUTPATIENT)
Dept: FAMILY MEDICINE CLINIC | Facility: CLINIC | Age: 39
End: 2024-05-28
Payer: COMMERCIAL

## 2024-05-28 VITALS
RESPIRATION RATE: 18 BRPM | HEIGHT: 74 IN | OXYGEN SATURATION: 96 % | WEIGHT: 275 LBS | BODY MASS INDEX: 35.29 KG/M2 | SYSTOLIC BLOOD PRESSURE: 126 MMHG | DIASTOLIC BLOOD PRESSURE: 82 MMHG | TEMPERATURE: 97.7 F | HEART RATE: 84 BPM

## 2024-05-28 DIAGNOSIS — E11.9 TYPE 2 DIABETES MELLITUS WITHOUT COMPLICATION, WITHOUT LONG-TERM CURRENT USE OF INSULIN (HCC): Primary | ICD-10-CM

## 2024-05-28 DIAGNOSIS — I10 PRIMARY HYPERTENSION: ICD-10-CM

## 2024-05-28 DIAGNOSIS — R45.4 ANGER: ICD-10-CM

## 2024-05-28 DIAGNOSIS — M25.511 CHRONIC RIGHT SHOULDER PAIN: ICD-10-CM

## 2024-05-28 DIAGNOSIS — M25.521 RIGHT ELBOW PAIN: ICD-10-CM

## 2024-05-28 DIAGNOSIS — F33.0 MILD EPISODE OF RECURRENT MAJOR DEPRESSIVE DISORDER (HCC): ICD-10-CM

## 2024-05-28 DIAGNOSIS — G89.29 CHRONIC RIGHT SHOULDER PAIN: ICD-10-CM

## 2024-05-28 PROCEDURE — 99214 OFFICE O/P EST MOD 30 MIN: CPT | Performed by: FAMILY MEDICINE

## 2024-05-28 RX ORDER — FLUOXETINE HYDROCHLORIDE 20 MG/1
40 CAPSULE ORAL DAILY
Qty: 180 CAPSULE | Refills: 3 | Status: SHIPPED | OUTPATIENT
Start: 2024-05-28

## 2024-05-28 NOTE — PROGRESS NOTES
Assessment/Plan:       Problem List Items Addressed This Visit          Cardiovascular and Mediastinum    Primary hypertension       Endocrine    Type 2 diabetes mellitus without complication, without long-term current use of insulin (HCC) - Primary       Behavioral Health    Mild episode of recurrent major depressive disorder (HCC)    Relevant Medications    FLUoxetine (PROzac) 20 mg capsule    Anger    Relevant Medications    FLUoxetine (PROzac) 20 mg capsule     Other Visit Diagnoses       Chronic right shoulder pain        Relevant Orders    Ambulatory Referral to Orthopedic Surgery    Right elbow pain        Relevant Orders    Ambulatory Referral to Orthopedic Surgery    XR elbow 3+ vw right              Subjective:      Patient ID: Mehrdad Aponte is a 38 y.o. male.    HPI    Diabetes- CGM, following closely endocrinology. Fasting glucose today 95, average 150.     Anger, irritability- Still occurring, finds no relief with Prozac.     PHQ-2/9 Depression Screening    Little interest or pleasure in doing things: 0 - not at all  Feeling down, depressed, or hopeless: 0 - not at all  Trouble falling or staying asleep, or sleeping too much: 0 - not at all  Feeling tired or having little energy: 0 - not at all  Poor appetite or overeatin - not at all  Feeling bad about yourself - or that you are a failure or have let yourself or your family down: 0 - not at all  Trouble concentrating on things, such as reading the newspaper or watching television: 0 - not at all  Moving or speaking so slowly that other people could have noticed. Or the opposite - being so fidgety or restless that you have been moving around a lot more than usual: 0 - not at all  Thoughts that you would be better off dead, or of hurting yourself in some way: 0 - not at all  PHQ-9 Score: 0  PHQ-9 Interpretation: No or Minimal depression         Chronic right shoulder pain, also right elbow, no recent injury, would like to establish with new ortho  "provider used to see [facility].     The following portions of the patient's history were reviewed and updated as appropriate: allergies, current medications, past family history, past medical history, past social history, past surgical history, and problem list.    Review of Systems   All other systems reviewed and are negative.        Objective:      /82   Pulse 84   Temp 97.7 °F (36.5 °C)   Resp 18   Ht 6' 2\" (1.88 m)   Wt 125 kg (275 lb)   SpO2 96%   BMI 35.31 kg/m²          Physical Exam  Vitals reviewed.   Constitutional:       General: He is not in acute distress.     Appearance: Normal appearance. He is not ill-appearing, toxic-appearing or diaphoretic.   Cardiovascular:      Pulses: no weak pulses.   Pulmonary:      Effort: Pulmonary effort is normal. No respiratory distress.   Musculoskeletal:      Comments: Tenderness right lateral elbow    Neurological:      Mental Status: He is alert and oriented to person, place, and time.   Psychiatric:         Mood and Affect: Mood normal.         Behavior: Behavior normal.           Patient's shoes and socks removed.        Assign Risk Category  No deformity present  No loss of protective sensation  No weak pulses  Risk: 0          Kavitha De Santiago,   [facility]     "

## 2024-06-28 ENCOUNTER — APPOINTMENT (OUTPATIENT)
Age: 39
End: 2024-06-28
Payer: COMMERCIAL

## 2024-06-28 DIAGNOSIS — M25.521 RIGHT ELBOW PAIN: ICD-10-CM

## 2024-06-28 PROCEDURE — 73080 X-RAY EXAM OF ELBOW: CPT

## 2024-07-02 ENCOUNTER — OFFICE VISIT (OUTPATIENT)
Age: 39
End: 2024-07-02
Payer: COMMERCIAL

## 2024-07-02 VITALS
HEIGHT: 70 IN | SYSTOLIC BLOOD PRESSURE: 139 MMHG | BODY MASS INDEX: 35.5 KG/M2 | WEIGHT: 248 LBS | DIASTOLIC BLOOD PRESSURE: 82 MMHG | HEART RATE: 84 BPM

## 2024-07-02 DIAGNOSIS — M25.511 CHRONIC RIGHT SHOULDER PAIN: ICD-10-CM

## 2024-07-02 DIAGNOSIS — M25.521 RIGHT ELBOW PAIN: ICD-10-CM

## 2024-07-02 DIAGNOSIS — G89.29 CHRONIC RIGHT SHOULDER PAIN: ICD-10-CM

## 2024-07-02 DIAGNOSIS — M77.11 LATERAL EPICONDYLITIS OF RIGHT ELBOW: Primary | ICD-10-CM

## 2024-07-02 DIAGNOSIS — M75.101 TEAR OF RIGHT SUPRASPINATUS TENDON: ICD-10-CM

## 2024-07-02 PROCEDURE — 99204 OFFICE O/P NEW MOD 45 MIN: CPT | Performed by: ORTHOPAEDIC SURGERY

## 2024-07-02 NOTE — PROGRESS NOTES
CHIEF COMPLAIN/REASON FOR VISIT  Chief Complaint   Patient presents with    Right Lower Leg - Pain       HISTORY OF PRESENT ILLNESS  Mehrdad Aponte is a RHD 39 y.o. male who presents for evaluation of their right elbow and right shoulder.  He is diabetic and mention pursuing right shoulder arthroscopy yet this was cancelled due to elevated Hb A1c.  Today he complains of right generalized shoulder pain and right lateral elbow pain.  Using right arm and grasping aggravates while rest alleviates.  He has had multiple steroid injection into right shoulder with short-lived benefit.      REVIEW OF SYSTEMS  Review of systems was performed and, woutside that mentioned in the HPI, it was negative for symptomology related to the integumentary, hematologic, immunologic, allergic, neurologic, cardiovascular, respiratory, GI or  systems.     MEDICAL HISTORY  Patient Active Problem List   Diagnosis    Type 2 diabetes mellitus without complication, without long-term current use of insulin (HCC)    Hyperlipidemia    Elevated TSH    Class 2 severe obesity due to excess calories with serious comorbidity and body mass index (BMI) of 35.0 to 35.9 in adult (HCC)    Vapes nicotine containing substance    Family history of premature CAD    Mild episode of recurrent major depressive disorder (HCC)    Anger    Rash    Intermittent chest pain    Primary hypertension    Gastroesophageal reflux disease    Pancreatic cyst    Fatty liver    Microalbuminuria    Hypertriglyceridemia    Erectile dysfunction    History of pancreatitis    Sleep apnea-like behavior    Snoring    Fatigue    Excessive daytime sleepiness       SURGICAL HISTORY  History reviewed. No pertinent surgical history.    CURRENT MEDICATIONS    Current Outpatient Medications:     amLODIPine (NORVASC) 5 mg tablet, Take 1 tablet (5 mg total) by mouth daily, Disp: 90 tablet, Rfl: 3    BD Pen Needle Deb U/F 32G X 4 MM MISC, Inject under the skin 4 (four) times a day, Disp: 400 each,  Rfl: 5    Continuous Glucose Sensor (Dexcom G7 Sensor), Use 1 Device every 10 days E11.65 use 1 device every 10 days, Disp: 9 each, Rfl: 1    famotidine (PEPCID) 20 mg tablet, Take 1 tablet (20 mg total) by mouth daily at bedtime Take 2 hours prior to bed, Disp: 90 tablet, Rfl: 3    fenofibrate micronized (LOFIBRA) 200 MG capsule, Take 1 capsule (200 mg total) by mouth daily with breakfast, Disp: 90 capsule, Rfl: 3    FLUoxetine (PROzac) 20 mg capsule, Take 2 capsules (40 mg total) by mouth daily, Disp: 180 capsule, Rfl: 3    glucagon (GLUCAGEN) 1 mg injection, 1 mg once, Disp: , Rfl:     metFORMIN (GLUCOPHAGE-XR) 500 mg 24 hr tablet, Take 2 tablets (1,000 mg total) by mouth 2 (two) times a day with meals, Disp: 360 tablet, Rfl: 3    methocarbamol (ROBAXIN) 500 mg tablet, Take 1 tablet (500 mg total) by mouth 3 (three) times a day as needed for muscle spasms, Disp: 30 tablet, Rfl: 0    NovoLOG FlexPen 100 units/mL injection pen, Inject 45 Units under the skin 3 (three) times a day with meals, Disp: 135 mL, Rfl: 3    omeprazole (PriLOSEC) 20 mg delayed release capsule, Take 1 capsule (20 mg total) by mouth daily Best to take 30 minutes to 1 hour before 1 meal a day, Disp: 90 capsule, Rfl: 3    phentermine 15 MG capsule, Take 1 capsule (15 mg total) by mouth every morning, Disp: 90 capsule, Rfl: 1    rosuvastatin (CRESTOR) 20 MG tablet, Take 1 tablet (20 mg total) by mouth daily, Disp: 90 tablet, Rfl: 3    sitaGLIPtin (JANUVIA) 100 mg tablet, Take 1 tablet (100 mg total) by mouth daily, Disp: 90 tablet, Rfl: 3    Tresiba FlexTouch 200 units/mL CONCENTRATED U-200 injection pen, Inject 60 Units under the skin every 12 (twelve) hours, Disp: 54 mL, Rfl: 3    sildenafil (VIAGRA) 25 MG tablet, Take 1 tablet (25 mg total) by mouth daily as needed for erectile dysfunction, Disp: 10 tablet, Rfl: 0    SOCIAL HISTORY  Social History     Socioeconomic History    Marital status: Single     Spouse name: Not on file    Number of  "children: Not on file    Years of education: Not on file    Highest education level: Not on file   Occupational History    Not on file   Tobacco Use    Smoking status: Former     Types: E-Cigarettes     Quit date: 2018     Years since quittin.5     Passive exposure: Past    Smokeless tobacco: Never   Vaping Use    Vaping status: Every Day    Substances: Nicotine, Flavoring   Substance and Sexual Activity    Alcohol use: Not Currently    Drug use: Not Currently    Sexual activity: Yes   Other Topics Concern    Not on file   Social History Narrative    Not on file     Social Determinants of Health     Financial Resource Strain: Not on file   Food Insecurity: Not on file   Transportation Needs: Not on file   Physical Activity: Not on file   Stress: Not on file   Social Connections: Not on file   Intimate Partner Violence: Not on file   Housing Stability: Not on file       Objective     VITAL SIGNS  Ht 5' 10\" (1.778 m)   Wt 112 kg (248 lb)   BMI 35.58 kg/m²      PHYSICAL EXAM  Musculoskeletal: Right Elbow     Skin Intact    TTP lateral condyle              Instability Negative              ROM Full and painless in all planes   Ligamentously Stable   Compartments Soft/Compressible.   Sensation and motor function intact through radial/ulnar/median nerve distributions.               Radial pulse palpable     General:   Well-appearing  No acute distress  Appears stated age    Cervical Spine  No tenderness to palpation over the cervical spine in the midline or of the paraspinal muscles  Full range of motion without pain  Negative spurlings   Negative Lhermitte test    right Shoulder  Negative tenderness to palpation over the acromioclavicular joint  Negative tenderness to palpation over the long head of the biceps tendon  Shoulder effusion none present  Shoulder abduction 180/180  Shoulder forward flexion 180/180  Shoulder external rotation 50/50  Shoulder internal rotation T7/T7  Supraspinatus/ABD 4/5 "   Infraspinatus/ER 5/5   Negative Belly Press/SS  Negative Neer  Negative Ware  Negative O'briens  Negative Apprehension  Negative Relocation  Negative Posterior Jerk  Negative Sulcus  Skin is intact with no erythema, warmth or drainage  Motor strength intact distally  Sensation to light touch is normal in the axillary, radial, ulnar, and median nerve distributions.  Fingers warm and perfused      Ipsilateral shoulder, forearm, and hand demonstrate no obvious swelling, deformity, or other abnormality The patient has full ROM and stability these joints. There is no obvious tenderness to palpation throughout.      The contralateral upper extremity is negative for any tenderness to palpation. There is no deformity present. Patient is neurovascularly intact throughout.     RADIOGRAPHIC EXAMINATION/DIAGNOSTICS:  Procedure: XR elbow 3+ vw right    Result Date: 6/28/2024  Narrative: RIGHT ELBOW INDICATION:   Pain in right elbow. COMPARISON:  None. VIEWS:  XR ELBOW 3+ VW RIGHT Images: 4 FINDINGS: There is no acute fracture or dislocation. There is no joint effusion. No significant degenerative changes. No lytic or blastic osseous lesion. Soft tissues are unremarkable.     Impression: No acute osseous abnormality. Electronically signed: 06/28/2024 09:59 PM Doe Cruz MD     ASSESSMENT/PLAN:   right elbow lateral epicondylitis   Right supraspinatus tear     Right elbow lateral epicondylitis  Patient provided with right wrist splint to wear at all times until symptoms resolved    Right shoulder supraspinatus tear  Patient to bring past imaging of right shoulder at any pointto discuss further treatment    The patient can follow up as needed and is welcome to return at any point with any new or old issue.        If any issues, questions, or concerns arise between now and the next appointment, we have encouraged the patient to contact our team.       Scribe Attestation      I,:  Rajesh Starr am acting as a scribe while  in the presence of the attending physician.:       I,:  Lyle Disla MD personally performed the services described in this documentation    as scribed in my presence.:

## 2024-07-12 DIAGNOSIS — Z00.6 ENCOUNTER FOR EXAMINATION FOR NORMAL COMPARISON OR CONTROL IN CLINICAL RESEARCH PROGRAM: ICD-10-CM

## 2024-07-14 ENCOUNTER — HOSPITAL ENCOUNTER (OUTPATIENT)
Dept: SLEEP CENTER | Facility: CLINIC | Age: 39
Discharge: HOME | End: 2024-07-14
Payer: COMMERCIAL

## 2024-07-14 DIAGNOSIS — I10 PRIMARY HYPERTENSION: ICD-10-CM

## 2024-07-14 DIAGNOSIS — R53.83 FATIGUE, UNSPECIFIED TYPE: ICD-10-CM

## 2024-07-14 DIAGNOSIS — R06.83 SNORING: ICD-10-CM

## 2024-07-14 DIAGNOSIS — G47.39 SLEEP APNEA-LIKE BEHAVIOR: ICD-10-CM

## 2024-07-14 PROCEDURE — G0399 HOME SLEEP TEST/TYPE 3 PORTA: HCPCS

## 2024-07-15 NOTE — PROGRESS NOTES
Home Sleep Study Documentation    HOME STUDY DEVICE: Noxturnal no                                           Dari G3 yes      Pre-Sleep Home Study:    Set-up and instructions performed by: Kenzie    Technician performed demonstration for Patient: yes    Return demonstration performed by Patient: yes    Written instructions provided to Patient: yes    Patient signed consent form: yes        Post-Sleep Home Study:    Additional comments by Patient: None    Home Sleep Study Failed:no:    Failure reason: N/A    Reported or Detected: N/A    Scored by: MIC Garnica

## 2024-07-16 PROBLEM — G47.33 OSA (OBSTRUCTIVE SLEEP APNEA): Status: ACTIVE | Noted: 2024-07-16

## 2024-07-17 PROCEDURE — 95806 SLEEP STUDY UNATT&RESP EFFT: CPT | Performed by: PSYCHIATRY & NEUROLOGY

## 2024-07-20 DIAGNOSIS — G47.33 OSA (OBSTRUCTIVE SLEEP APNEA): Primary | ICD-10-CM

## 2024-08-02 ENCOUNTER — TELEPHONE (OUTPATIENT)
Dept: SLEEP CENTER | Facility: CLINIC | Age: 39
End: 2024-08-02

## 2024-08-02 ENCOUNTER — APPOINTMENT (OUTPATIENT)
Dept: LAB | Facility: CLINIC | Age: 39
End: 2024-08-02
Payer: COMMERCIAL

## 2024-08-02 DIAGNOSIS — E78.1 HYPERTRIGLYCERIDEMIA: ICD-10-CM

## 2024-08-02 DIAGNOSIS — Z00.6 ENCOUNTER FOR EXAMINATION FOR NORMAL COMPARISON OR CONTROL IN CLINICAL RESEARCH PROGRAM: ICD-10-CM

## 2024-08-02 DIAGNOSIS — E11.9 TYPE 2 DIABETES MELLITUS WITHOUT COMPLICATION, WITHOUT LONG-TERM CURRENT USE OF INSULIN (HCC): ICD-10-CM

## 2024-08-02 LAB
ALBUMIN SERPL BCG-MCNC: 4.4 G/DL (ref 3.5–5)
ALP SERPL-CCNC: 51 U/L (ref 34–104)
ALT SERPL W P-5'-P-CCNC: 40 U/L (ref 7–52)
ANION GAP SERPL CALCULATED.3IONS-SCNC: 8 MMOL/L (ref 4–13)
AST SERPL W P-5'-P-CCNC: 16 U/L (ref 13–39)
BASOPHILS # BLD AUTO: 0.03 THOUSANDS/ÂΜL (ref 0–0.1)
BASOPHILS NFR BLD AUTO: 1 % (ref 0–1)
BILIRUB SERPL-MCNC: 0.38 MG/DL (ref 0.2–1)
BUN SERPL-MCNC: 15 MG/DL (ref 5–25)
CALCIUM SERPL-MCNC: 9.2 MG/DL (ref 8.4–10.2)
CHLORIDE SERPL-SCNC: 103 MMOL/L (ref 96–108)
CHOLEST SERPL-MCNC: 118 MG/DL
CO2 SERPL-SCNC: 27 MMOL/L (ref 21–32)
CREAT SERPL-MCNC: 1.08 MG/DL (ref 0.6–1.3)
CREAT UR-MCNC: 95.9 MG/DL
EOSINOPHIL # BLD AUTO: 0.13 THOUSAND/ÂΜL (ref 0–0.61)
EOSINOPHIL NFR BLD AUTO: 2 % (ref 0–6)
ERYTHROCYTE [DISTWIDTH] IN BLOOD BY AUTOMATED COUNT: 12 % (ref 11.6–15.1)
EST. AVERAGE GLUCOSE BLD GHB EST-MCNC: 197 MG/DL
GFR SERPL CREATININE-BSD FRML MDRD: 85 ML/MIN/1.73SQ M
GLUCOSE P FAST SERPL-MCNC: 253 MG/DL (ref 65–99)
HBA1C MFR BLD: 8.5 %
HCT VFR BLD AUTO: 39.4 % (ref 36.5–49.3)
HDLC SERPL-MCNC: 28 MG/DL
HGB BLD-MCNC: 13.1 G/DL (ref 12–17)
IMM GRANULOCYTES # BLD AUTO: 0.01 THOUSAND/UL (ref 0–0.2)
IMM GRANULOCYTES NFR BLD AUTO: 0 % (ref 0–2)
LDLC SERPL CALC-MCNC: 44 MG/DL (ref 0–100)
LYMPHOCYTES # BLD AUTO: 1.59 THOUSANDS/ÂΜL (ref 0.6–4.47)
LYMPHOCYTES NFR BLD AUTO: 30 % (ref 14–44)
MCH RBC QN AUTO: 29.4 PG (ref 26.8–34.3)
MCHC RBC AUTO-ENTMCNC: 33.2 G/DL (ref 31.4–37.4)
MCV RBC AUTO: 89 FL (ref 82–98)
MICROALBUMIN UR-MCNC: 8 MG/L
MICROALBUMIN/CREAT 24H UR: 8 MG/G CREATININE (ref 0–30)
MONOCYTES # BLD AUTO: 0.5 THOUSAND/ÂΜL (ref 0.17–1.22)
MONOCYTES NFR BLD AUTO: 9 % (ref 4–12)
NEUTROPHILS # BLD AUTO: 3.06 THOUSANDS/ÂΜL (ref 1.85–7.62)
NEUTS SEG NFR BLD AUTO: 58 % (ref 43–75)
NRBC BLD AUTO-RTO: 0 /100 WBCS
PLATELET # BLD AUTO: 213 THOUSANDS/UL (ref 149–390)
PMV BLD AUTO: 10.6 FL (ref 8.9–12.7)
POTASSIUM SERPL-SCNC: 4.5 MMOL/L (ref 3.5–5.3)
PROT SERPL-MCNC: 7.3 G/DL (ref 6.4–8.4)
RBC # BLD AUTO: 4.45 MILLION/UL (ref 3.88–5.62)
SODIUM SERPL-SCNC: 138 MMOL/L (ref 135–147)
TRIGL SERPL-MCNC: 231 MG/DL
WBC # BLD AUTO: 5.32 THOUSAND/UL (ref 4.31–10.16)

## 2024-08-02 PROCEDURE — 82570 ASSAY OF URINE CREATININE: CPT

## 2024-08-02 PROCEDURE — 82043 UR ALBUMIN QUANTITATIVE: CPT

## 2024-08-02 PROCEDURE — 3052F HG A1C>EQUAL 8.0%<EQUAL 9.0%: CPT | Performed by: ORTHOPAEDIC SURGERY

## 2024-08-02 PROCEDURE — 83036 HEMOGLOBIN GLYCOSYLATED A1C: CPT

## 2024-08-02 PROCEDURE — 36415 COLL VENOUS BLD VENIPUNCTURE: CPT

## 2024-08-02 PROCEDURE — 85025 COMPLETE CBC W/AUTO DIFF WBC: CPT

## 2024-08-02 PROCEDURE — 80053 COMPREHEN METABOLIC PANEL: CPT

## 2024-08-02 PROCEDURE — 80061 LIPID PANEL: CPT

## 2024-08-02 NOTE — TELEPHONE ENCOUNTER
Home sleep study resulted, confirms the diagnosis of moderate DINA with event related desaturations.      CPAP recommended and prescription placed.    Call placed to patient, left call back message.    "[employer]t message sent providing results, recommendations and instructions

## 2024-08-02 NOTE — TELEPHONE ENCOUNTER
----- Message from FORREST Vieira sent at 7/20/2024  8:32 AM EDT -----  Moderate obstructive sleep apnea was confirmed during the home sleep study and is likely contributing to symptoms.  Recommend trial of auto-CPAP.  A prescription for equipment has been provided.  Patient to be scheduled for set up of equipment, followed by compliance follow up 31-91 days after set up.

## 2024-08-06 LAB
LEFT EYE DIABETIC RETINOPATHY: NORMAL
RIGHT EYE DIABETIC RETINOPATHY: NORMAL

## 2024-08-13 ENCOUNTER — OFFICE VISIT (OUTPATIENT)
Dept: ENDOCRINOLOGY | Facility: CLINIC | Age: 39
End: 2024-08-13
Payer: COMMERCIAL

## 2024-08-13 VITALS
BODY MASS INDEX: 39.51 KG/M2 | WEIGHT: 276 LBS | TEMPERATURE: 97.2 F | DIASTOLIC BLOOD PRESSURE: 80 MMHG | SYSTOLIC BLOOD PRESSURE: 124 MMHG | HEART RATE: 87 BPM | HEIGHT: 70 IN

## 2024-08-13 DIAGNOSIS — K76.0 FATTY LIVER: ICD-10-CM

## 2024-08-13 DIAGNOSIS — I10 PRIMARY HYPERTENSION: ICD-10-CM

## 2024-08-13 DIAGNOSIS — E11.9 TYPE 2 DIABETES MELLITUS WITHOUT COMPLICATION, WITHOUT LONG-TERM CURRENT USE OF INSULIN (HCC): Primary | ICD-10-CM

## 2024-08-13 DIAGNOSIS — E78.2 MIXED HYPERLIPIDEMIA: ICD-10-CM

## 2024-08-13 DIAGNOSIS — E66.01 CLASS 2 SEVERE OBESITY DUE TO EXCESS CALORIES WITH SERIOUS COMORBIDITY AND BODY MASS INDEX (BMI) OF 35.0 TO 35.9 IN ADULT (HCC): ICD-10-CM

## 2024-08-13 PROCEDURE — 99214 OFFICE O/P EST MOD 30 MIN: CPT | Performed by: STUDENT IN AN ORGANIZED HEALTH CARE EDUCATION/TRAINING PROGRAM

## 2024-08-13 RX ORDER — INSULIN ASPART 100 [IU]/ML
30 INJECTION, SOLUTION INTRAVENOUS; SUBCUTANEOUS
Qty: 135 ML | Refills: 3 | Status: SHIPPED | OUTPATIENT
Start: 2024-08-13

## 2024-08-13 RX ORDER — ACYCLOVIR 400 MG/1
1 TABLET ORAL ONCE
Qty: 1 EACH | Refills: 0 | Status: SHIPPED | OUTPATIENT
Start: 2024-08-13 | End: 2024-08-13

## 2024-08-13 RX ORDER — INSULIN DEGLUDEC 200 U/ML
50 INJECTION, SOLUTION SUBCUTANEOUS EVERY 12 HOURS SCHEDULED
Qty: 54 ML | Refills: 3 | Status: SHIPPED | OUTPATIENT
Start: 2024-08-13

## 2024-08-13 NOTE — PROGRESS NOTES
Ambulatory Visit  Name: Mehrdad Aponte      : 1985      MRN: [id number]  Encounter Provider: Kraig Marley DO  Encounter Date: 2024   Encounter department: [facility]    Assessment & Plan   1. Type 2 diabetes mellitus without complication, without long-term current use of insulin (HCC)  Assessment & Plan:  Improving. Mehrdad has severe insulin resistance. Improvements in weight would be beneficial. I would like to pursue a plan to decrease insulin dependence. We discussed GLP. 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 His Tg have been more acceptably controlled. I believe he is low likelihood of risk as a modifiable risk factor was accounted for, however I advised him of this association and possible reservation about its use. He is agreeable to this plan. Pro/cons reviewed. There is a prior history of GI intolerance to ozempic. Will rx mounjaro 2.5 mg weekly. Decrease LA insulin 50 units q12h, decrease novolog 30 units TID AC, STOP Januvia. Will plan 3-4mo f/u with labs.   Orders:  -     Continuous Glucose  (Dexcom G7 ) CRISPIN; Use 1 each 1 (one) time for 1 dose  -     NovoLOG FlexPen 100 units/mL injection pen; Inject 30 Units under the skin 3 (three) times a day with meals  -     tirzepatide (Mounjaro) 2.5 MG/0.5ML; Inject 0.5 mL (2.5 mg total) under the skin every 7 days  -     Tresiba FlexTouch 200 units/mL CONCENTRATED U-200 injection pen; Inject 50 Units under the skin every 12 (twelve) hours  -     Comprehensive metabolic panel; Future; Expected date: 2024  -     Hemoglobin A1C; Future; Expected date: 2024  2. Fatty liver  Assessment & Plan:  Should pursue weight loss and reduction in insulin   3. Primary hypertension  Assessment & Plan:  Good control  4. Mixed hyperlipidemia  Assessment & Plan:  Stable  5. Class 2 severe obesity due to excess calories with serious comorbidity and body mass index (BMI) of  "35.0 to 35.9 in adult (HCC)  Assessment & Plan:  Had some interval improvement then worsening of weight. Mehrdad will continue phentermine for now, however I anticipate it will have limited ongoing use by next interval follow up    RTC 4-mo    History of Present Illness     Mehrdad Aponte is a 39 y.o. male who presents in follow up of type 2 diabetes. No new concerns today, no symptoms to report. Currently on MDI insulin plan plus januvia. Reports temporary termination of CGM wear due to change in mobile device, now has compatible cell phone and will resume wear. No CBG data available. Last A1c improving. Reports prior hx GI intolerance to ozempic (diarrhea).     Review of Systems   Constitutional:  Negative for diaphoresis and unexpected weight change.   Gastrointestinal:  Negative for nausea and vomiting.   Endocrine: Negative for polydipsia and polyuria.   All other systems reviewed and are negative.      Objective     /80   Pulse 87   Temp (!) 97.2 °F (36.2 °C)   Ht 5' 10\" (1.778 m)   Wt 125 kg (276 lb)   BMI 39.60 kg/m²     Physical Exam  Vitals reviewed.   Constitutional:       General: He is not in acute distress.     Appearance: Normal appearance.   HENT:      Head: Normocephalic and atraumatic.      Nose: Nose normal.   Eyes:      General: No scleral icterus.     Conjunctiva/sclera: Conjunctivae normal.   Pulmonary:      Effort: Pulmonary effort is normal. No respiratory distress.   Musculoskeletal:         General: No deformity.      Cervical back: Normal range of motion.   Skin:     General: Skin is warm and dry.   Neurological:      Mental Status: He is alert.   Psychiatric:         Mood and Affect: Mood normal.         Behavior: Behavior normal.       Lab Results   Component Value Date     06/18/2015    SODIUM 138 08/02/2024    K 4.5 08/02/2024     08/02/2024    CO2 27 08/02/2024    ANIONGAP 8 06/18/2015    AGAP 8 08/02/2024    BUN 15 08/02/2024    CREATININE 1.08 08/02/2024    GLUC " "179 (H) 03/05/2024    GLUF 253 (H) 08/02/2024    CALCIUM 9.2 08/02/2024    AST 16 08/02/2024    ALT 40 08/02/2024    ALKPHOS 51 08/02/2024    TP 7.3 08/02/2024    TBILI 0.38 08/02/2024    EGFR 85 08/02/2024     Lab Results   Component Value Date    HGBA1C 8.5 (H) 08/02/2024     Lab Results   Component Value Date    CHOLESTEROL 118 08/02/2024    CHOLESTEROL 183 02/14/2024    CHOLESTEROL 133 12/09/2022     Lab Results   Component Value Date    HDL 28 (L) 08/02/2024    HDL 21 (L) 02/14/2024    HDL 27 (L) 12/09/2022     Lab Results   Component Value Date    TRIG 231 (H) 08/02/2024    TRIG 305 (H) 04/26/2024    TRIG 967 (H) 02/14/2024     No results found for: \"NONHDLC\"      Administrative Statements           "

## 2024-08-13 NOTE — ASSESSMENT & PLAN NOTE
Improving. Mehrdad has severe insulin resistance. Improvements in weight would be beneficial. I would like to pursue a plan to decrease insulin dependence. We discussed GLP. 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 His Tg have been more acceptably controlled. I believe he is low likelihood of risk as a modifiable risk factor was accounted for, however I advised him of this association and possible reservation about its use. He is agreeable to this plan. Pro/cons reviewed. There is a prior history of GI intolerance to ozempic. Will rx mounjaro 2.5 mg weekly. Decrease LA insulin 50 units q12h, decrease novolog 30 units TID AC, STOP Januvia. Will plan 3-4mo f/u with labs.

## 2024-08-13 NOTE — ASSESSMENT & PLAN NOTE
Had some interval improvement then worsening of weight. Mehrdad will continue phentermine for now, however I anticipate it will have limited ongoing use by next interval follow up

## 2024-09-30 ENCOUNTER — TELEPHONE (OUTPATIENT)
Dept: OTHER | Facility: HOSPITAL | Age: 39
End: 2024-09-30

## 2024-09-30 DIAGNOSIS — R89.8 ABNORMAL GENETIC TEST: Primary | ICD-10-CM

## 2024-09-30 LAB
APOB+LDLR+PCSK9 GENE MUT ANL BLD/T: NOT DETECTED
BRCA1+BRCA2 DEL+DUP + FULL MUT ANL BLD/T: ABNORMAL
GENE DIS ANL INTERP-IMP: POSITIVE
MLH1+MSH2+MSH6+PMS2 GN DEL+DUP+FUL M: NOT DETECTED

## 2024-10-01 ENCOUNTER — TELEPHONE (OUTPATIENT)
Dept: GENETICS | Facility: CLINIC | Age: 39
End: 2024-10-01

## 2024-10-01 ENCOUNTER — OFFICE VISIT (OUTPATIENT)
Dept: URGENT CARE | Facility: CLINIC | Age: 39
End: 2024-10-01
Payer: COMMERCIAL

## 2024-10-01 VITALS
TEMPERATURE: 98.7 F | DIASTOLIC BLOOD PRESSURE: 80 MMHG | HEIGHT: 70 IN | OXYGEN SATURATION: 96 % | WEIGHT: 279 LBS | BODY MASS INDEX: 39.94 KG/M2 | HEART RATE: 92 BPM | RESPIRATION RATE: 18 BRPM | SYSTOLIC BLOOD PRESSURE: 142 MMHG

## 2024-10-01 DIAGNOSIS — R73.9 HYPERGLYCEMIA: Primary | ICD-10-CM

## 2024-10-01 LAB — GLUCOSE SERPL-MCNC: 259 MG/DL (ref 65–140)

## 2024-10-01 PROCEDURE — G0382 LEV 3 HOSP TYPE B ED VISIT: HCPCS

## 2024-10-01 PROCEDURE — S9083 URGENT CARE CENTER GLOBAL: HCPCS

## 2024-10-01 PROCEDURE — 82948 REAGENT STRIP/BLOOD GLUCOSE: CPT

## 2024-10-01 RX ORDER — ACYCLOVIR 400 MG/1
TABLET ORAL
COMMUNITY
Start: 2024-08-14

## 2024-10-01 NOTE — PROGRESS NOTES
[facility]        NAME: Mehrdad Aponte is a 39 y.o. male  : 1985    MRN: [id number]  DATE: 2024  TIME: 4:10 PM    Assessment and Plan   Hyperglycemia [R73.9]  1. Hyperglycemia          Discussed problem with patient.  No signs of hypoglycemia and patient seems well.  Glucose is tracking downward advised to continue monitoring sugars for the same.  Discussed signs of hyperglycemia which patient is aware of and to report to the ER if experiencing.  Work note given.  Should follow-up with endocrinology    Patient Instructions       Follow up with PCP in 3-5 days.  Proceed to  ER if symptoms worsen.    If tests are performed, our office will contact you with results only if changes need to made to the care plan discussed with you at the visit. [facility]    Chief Complaint     Chief Complaint   Patient presents with    Hyperglycemia     Patient has DM2 insulin dependent. Has Dexcom and it kept going off at work, reading high. His boss made him go home. His glucose is now 326 and slowly going down. Boss required him to go to doctor and get note         History of Present Illness         39-year-old male with type 2 diabetes presents with elevated blood glucose readings.  Has a Dexcom which has been going off at work and has been reading to his maximum of 400.  States this is typical for him to have elevated readings.  Denies any signs of hyperglycemia including diaphoresis, fatigue, nausea, increased thirst.  Gave himself extra insulin reports since then his glucose has been trending downward.  Reports his last reading was 326.  Currently 259.  States otherwise he is in his normal state of health        Review of Systems   Review of Systems   Constitutional:  Negative for appetite change, chills, fatigue and fever.   Eyes:  Negative for photophobia and visual disturbance.   Respiratory:  Negative for cough, shortness of breath, wheezing and stridor.     Cardiovascular:  Negative for chest pain and palpitations.   Gastrointestinal:  Negative for nausea and vomiting.   Neurological:  Negative for dizziness, syncope, weakness, light-headedness and headaches.         Current Medications       Current Outpatient Medications:     amLODIPine (NORVASC) 5 mg tablet, Take 1 tablet (5 mg total) by mouth daily, Disp: 90 tablet, Rfl: 3    BD Pen Needle Deb U/F 32G X 4 MM MISC, Inject under the skin 4 (four) times a day, Disp: 400 each, Rfl: 5    Continuous Glucose  (Dexcom G7 ) CRISPIN, USE 1 EACH 1 (ONE) TIME FOR 1 DOSE, Disp: , Rfl:     Continuous Glucose Sensor (Dexcom G7 Sensor), Use 1 Device every 10 days E11.65 use 1 device every 10 days, Disp: 9 each, Rfl: 1    famotidine (PEPCID) 20 mg tablet, Take 1 tablet (20 mg total) by mouth daily at bedtime Take 2 hours prior to bed, Disp: 90 tablet, Rfl: 3    fenofibrate micronized (LOFIBRA) 200 MG capsule, Take 1 capsule (200 mg total) by mouth daily with breakfast, Disp: 90 capsule, Rfl: 3    FLUoxetine (PROzac) 20 mg capsule, Take 2 capsules (40 mg total) by mouth daily, Disp: 180 capsule, Rfl: 3    glucagon (GLUCAGEN) 1 mg injection, 1 mg once, Disp: , Rfl:     metFORMIN (GLUCOPHAGE-XR) 500 mg 24 hr tablet, Take 2 tablets (1,000 mg total) by mouth 2 (two) times a day with meals, Disp: 360 tablet, Rfl: 3    methocarbamol (ROBAXIN) 500 mg tablet, Take 1 tablet (500 mg total) by mouth 3 (three) times a day as needed for muscle spasms, Disp: 30 tablet, Rfl: 0    NovoLOG FlexPen 100 units/mL injection pen, Inject 30 Units under the skin 3 (three) times a day with meals, Disp: 135 mL, Rfl: 3    omeprazole (PriLOSEC) 20 mg delayed release capsule, Take 1 capsule (20 mg total) by mouth daily Best to take 30 minutes to 1 hour before 1 meal a day, Disp: 90 capsule, Rfl: 3    phentermine 15 MG capsule, Take 1 capsule (15 mg total) by mouth every morning, Disp: 90 capsule, Rfl: 1    rosuvastatin (CRESTOR) 20 MG tablet,  "Take 1 tablet (20 mg total) by mouth daily, Disp: 90 tablet, Rfl: 3    tirzepatide (Mounjaro) 2.5 MG/0.5ML, Inject 0.5 mL (2.5 mg total) under the skin every 7 days, Disp: 2 mL, Rfl: 0    Tresiba FlexTouch 200 units/mL CONCENTRATED U-200 injection pen, Inject 50 Units under the skin every 12 (twelve) hours, Disp: 54 mL, Rfl: 3    sildenafil (VIAGRA) 25 MG tablet, Take 1 tablet (25 mg total) by mouth daily as needed for erectile dysfunction, Disp: 10 tablet, Rfl: 0    Current Allergies     Allergies as of 10/01/2024    (No Known Allergies)            The following portions of the patient's history were reviewed and updated as appropriate: allergies, current medications, past family history, past medical history, past social history, past surgical history and problem list.     Past Medical History:   Diagnosis Date    Diabetes mellitus        History reviewed. No pertinent surgical history.    Family History   Problem Relation Age of Onset    Thyroid disease Mother     Hypertension Father     Hyperlipidemia Father     Diabetes Father     Thyroid disease Father     Heart attack Father          Medications have been verified.        Objective   /80   Pulse 92   Temp 98.7 °F (37.1 °C)   Resp 18   Ht 5' 10\" (1.778 m)   Wt 127 kg (279 lb)   SpO2 96%   BMI 40.03 kg/m²        Physical Exam     Physical Exam  Vitals and nursing note reviewed.   Constitutional:       General: He is not in acute distress.     Appearance: Normal appearance. He is normal weight. He is not ill-appearing, toxic-appearing or diaphoretic.      Comments: Awake and alert x 3.  No signs of acute distress and speaking in full sentences without difficulty   Cardiovascular:      Rate and Rhythm: Normal rate and regular rhythm.      Pulses: Normal pulses.      Heart sounds: Normal heart sounds. No murmur heard.     No friction rub. No gallop.   Pulmonary:      Effort: Pulmonary effort is normal. No respiratory distress.      Breath sounds: " Normal breath sounds. No stridor. No wheezing, rhonchi or rales.   Chest:      Chest wall: No tenderness.   Neurological:      Mental Status: He is alert.

## 2024-10-01 NOTE — LETTER
October 1, 2024     Patient: Mehrdad Aponte   YOB: 1985   Date of Visit: 10/1/2024       To Whom it May Concern:    Mehrdad Aponte was seen in my clinic on 10/1/2024. He may return to work on 10/03 .    If you have any questions or concerns, please don't hesitate to call.         Sincerely,          Je Fonseca PA-C        CC: No Recipients

## 2024-10-03 ENCOUNTER — TELEPHONE (OUTPATIENT)
Dept: OTHER | Facility: HOSPITAL | Age: 39
End: 2024-10-03

## 2024-10-10 ENCOUNTER — TELEPHONE (OUTPATIENT)
Dept: OTHER | Facility: HOSPITAL | Age: 39
End: 2024-10-10

## 2024-10-29 ENCOUNTER — TELEPHONE (OUTPATIENT)
Dept: OTHER | Facility: HOSPITAL | Age: 39
End: 2024-10-29

## 2025-01-29 ENCOUNTER — TELEPHONE (OUTPATIENT)
Dept: ENDOCRINOLOGY | Facility: CLINIC | Age: 40
End: 2025-01-29

## 2025-02-19 ENCOUNTER — OFFICE VISIT (OUTPATIENT)
Dept: ENDOCRINOLOGY | Facility: CLINIC | Age: 40
End: 2025-02-19
Payer: COMMERCIAL

## 2025-02-19 VITALS
WEIGHT: 290 LBS | SYSTOLIC BLOOD PRESSURE: 148 MMHG | TEMPERATURE: 97.8 F | BODY MASS INDEX: 37.22 KG/M2 | OXYGEN SATURATION: 97 % | DIASTOLIC BLOOD PRESSURE: 86 MMHG | HEART RATE: 92 BPM | HEIGHT: 74 IN

## 2025-02-19 DIAGNOSIS — E11.9 TYPE 2 DIABETES MELLITUS WITHOUT COMPLICATION, WITHOUT LONG-TERM CURRENT USE OF INSULIN (HCC): Primary | ICD-10-CM

## 2025-02-19 DIAGNOSIS — R45.4 ANGER: ICD-10-CM

## 2025-02-19 DIAGNOSIS — F33.0 MILD EPISODE OF RECURRENT MAJOR DEPRESSIVE DISORDER (HCC): ICD-10-CM

## 2025-02-19 DIAGNOSIS — E11.9 TYPE 2 DIABETES MELLITUS WITHOUT COMPLICATION, WITHOUT LONG-TERM CURRENT USE OF INSULIN (HCC): ICD-10-CM

## 2025-02-19 DIAGNOSIS — K76.0 FATTY LIVER: ICD-10-CM

## 2025-02-19 DIAGNOSIS — E78.2 MIXED HYPERLIPIDEMIA: ICD-10-CM

## 2025-02-19 DIAGNOSIS — I10 PRIMARY HYPERTENSION: ICD-10-CM

## 2025-02-19 DIAGNOSIS — E66.813 CLASS 3 SEVERE OBESITY DUE TO EXCESS CALORIES WITH SERIOUS COMORBIDITY AND BODY MASS INDEX (BMI) OF 40.0 TO 44.9 IN ADULT (HCC): ICD-10-CM

## 2025-02-19 DIAGNOSIS — E66.01 CLASS 3 SEVERE OBESITY DUE TO EXCESS CALORIES WITH SERIOUS COMORBIDITY AND BODY MASS INDEX (BMI) OF 40.0 TO 44.9 IN ADULT (HCC): ICD-10-CM

## 2025-02-19 PROCEDURE — 95251 CONT GLUC MNTR ANALYSIS I&R: CPT | Performed by: STUDENT IN AN ORGANIZED HEALTH CARE EDUCATION/TRAINING PROGRAM

## 2025-02-19 PROCEDURE — 99214 OFFICE O/P EST MOD 30 MIN: CPT | Performed by: STUDENT IN AN ORGANIZED HEALTH CARE EDUCATION/TRAINING PROGRAM

## 2025-02-19 RX ORDER — PHENTERMINE HYDROCHLORIDE 15 MG/1
15 CAPSULE ORAL EVERY MORNING
Qty: 90 CAPSULE | Refills: 1 | Status: SHIPPED | OUTPATIENT
Start: 2025-02-19

## 2025-02-19 RX ORDER — INSULIN HUMAN 500 [IU]/ML
INJECTION, SOLUTION SUBCUTANEOUS
Qty: 18 ML | Refills: 3 | Status: SHIPPED | OUTPATIENT
Start: 2025-02-19

## 2025-02-19 NOTE — PROGRESS NOTES
Name: Mehrdad Aponte      : 1985      MRN: [id number]  Encounter Provider: Kraig Marley DO  Encounter Date: 2025   Encounter department: [facility]  :  Assessment & Plan  Type 2 diabetes mellitus without complication, without long-term current use of insulin (HCC)  Uncontrolled, worsening. There is significant insulin resistance. 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 There is history of intolerance to ozempic. Insulin utilization in excess of 200 units per day, control remains poor. Discussed change to u500. Stop novolog, and tresiba, start humulin u500 140 - 0 - 90 - 0 units daily. Cw metformin. We are sharing via clarity, notify provider if having persistent high or hypoglycemia. RTC 3-mo with labs  Lab Results   Component Value Date    HGBA1C 8.5 (H) 2024     Orders:  •  Insulin Regular Human, Conc, (HumuLIN R U-500 KwikPen) 500 units/mL CONCENTRATED U-500 injection pen; E11.65 inject 140 units before breakfast, and 90 units before supper  •  Comprehensive metabolic panel; Future  •  Hemoglobin A1C; Future  •  Lipid Panel with Direct LDL reflex; Future  •  Albumin / creatinine urine ratio; Future    Fatty liver         Primary hypertension  Fair control. Will monitor       Mixed hyperlipidemia  Cw statin       Class 3 severe obesity due to excess calories with serious comorbidity and body mass index (BMI) of 40.0 to 44.9 in adult (HCC)         BMI 35.0-35.9,adult    Orders:  •  phentermine 15 MG capsule; Take 1 capsule (15 mg total) by mouth every morning    Mild episode of recurrent major depressive disorder (HCC)    Orders:  •  FLUoxetine (PROzac) 20 mg capsule; Take 2 capsules (40 mg total) by mouth daily    Anger    Orders:  •  FLUoxetine (PROzac) 20 mg capsule; Take 2 capsules (40 mg total) by mouth daily      RTC 3-mo    History of Present Illness   HPI  Mehrdad Aponte is a 39 y.o. male who presents in follow  "up of T2D. Notes loss of employment in Nov, and loss of insurance until Jan. He mentions new job 2nd shift works 10h days. He mentions blood sugars have been very high. He is using tresiba 120 units daily, novolog 40 - 45 units with meals, but hyperglycemia has been persistent.     There is history of GI intolerance to ozempic (diarrhea). 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         Review of Systems   All other systems reviewed and are negative.         Objective   /86 (BP Location: Right arm, Patient Position: Sitting)   Pulse 92   Temp 97.8 °F (36.6 °C)   Ht 6' 2\" (1.88 m)   Wt 132 kg (290 lb)   SpO2 97%   BMI 37.23 kg/m²      Physical Exam  Vitals reviewed.   Constitutional:       General: He is not in acute distress.     Appearance: Normal appearance.   HENT:      Head: Normocephalic and atraumatic.      Nose: Nose normal.   Eyes:      General: No scleral icterus.     Conjunctiva/sclera: Conjunctivae normal.   Pulmonary:      Effort: Pulmonary effort is normal. No respiratory distress.   Musculoskeletal:         General: No deformity.      Cervical back: Normal range of motion.   Skin:     General: Skin is warm and dry.   Neurological:      Mental Status: He is alert.   Psychiatric:         Mood and Affect: Mood normal.         Behavior: Behavior normal.         Component      Latest Ref Rng 8/2/2024   Sodium      135 - 147 mmol/L 138    Potassium      3.5 - 5.3 mmol/L 4.5    Chloride      96 - 108 mmol/L 103    Carbon Dioxide      21 - 32 mmol/L 27    ANION GAP      4 - 13 mmol/L 8    BUN      5 - 25 mg/dL 15    Creatinine      0.60 - 1.30 mg/dL 1.08    GLUCOSE, FASTING      65 - 99 mg/dL 253 (H)    Calcium      8.4 - 10.2 mg/dL 9.2    AST      13 - 39 U/L 16    ALT      7 - 52 U/L 40    ALK PHOS      34 - 104 U/L 51    Total Protein      6.4 - 8.4 g/dL 7.3    Albumin      3.5 - 5.0 g/dL 4.4    Total Bilirubin      0.20 - 1.00 mg/dL 0.38    GFR, Calculated      ml/min/1.73sq m " 85    Cholesterol      See Comment mg/dL 118    Triglycerides      See Comment mg/dL 231 (H)    HDL      >=40 mg/dL 28 (L)    LDL Calculated      0 - 100 mg/dL 44    EXT Creatinine Urine      Reference range not established. mg/dL 95.9    Albumin,U,Random      <20.0 mg/L 8.0    Albumin Creat Ratio      0 - 30 mg/g creatinine 8    Hemoglobin A1C      Normal 4.0-5.6%; PreDiabetic 5.7-6.4%; Diabetic >=6.5%; Glycemic control for adults with diabetes <7.0% % 8.5 (H)    eAG, EST AVG Glucose      mg/dl 197       Legend:  (H) High  (L) Low    Mehrdad Aponte   Device used DEXCOM  Home use     Indication   Type 2 Diabetes    More than 72 hours of data was reviewed. Report to be scanned to chart.     Date Range: Feb 4 - Feb 17, 2025    Analysis of data:   % time CGM used: 82%  Average Glucose: 281   Coefficient of Variation: 15.6%   Time in Target Range: 0%   Time Above Range: 100% (74% very high)   Time Below Range: 0%     Interpretation of data: uncontrolled with severe hyperglycemia throughout all times of day

## 2025-02-19 NOTE — ASSESSMENT & PLAN NOTE
Uncontrolled, worsening. There is significant insulin resistance. 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 There is history of intolerance to ozempic. Insulin utilization in excess of 200 units per day, control remains poor. Discussed change to u500. Stop novolog, and tresiba, start humulin u500 140 - 0 - 90 - 0 units daily. Cw metformin. We are sharing via clarity, notify provider if having persistent high or hypoglycemia. RTC 3-mo with labs  Lab Results   Component Value Date    HGBA1C 8.5 (H) 08/02/2024     Orders:  •  Insulin Regular Human, Conc, (HumuLIN R U-500 KwikPen) 500 units/mL CONCENTRATED U-500 injection pen; E11.65 inject 140 units before breakfast, and 90 units before supper  •  Comprehensive metabolic panel; Future  •  Hemoglobin A1C; Future  •  Lipid Panel with Direct LDL reflex; Future  •  Albumin / creatinine urine ratio; Future

## 2025-02-20 RX ORDER — ACYCLOVIR 400 MG/1
1 TABLET ORAL
Qty: 9 EACH | Refills: 1 | Status: SHIPPED | OUTPATIENT
Start: 2025-02-20

## 2025-02-20 RX ORDER — PEN NEEDLE, DIABETIC 32GX 5/32"
NEEDLE, DISPOSABLE MISCELLANEOUS 4 TIMES DAILY
Qty: 400 EACH | Refills: 1 | Status: SHIPPED | OUTPATIENT
Start: 2025-02-20

## 2025-03-27 ENCOUNTER — APPOINTMENT (EMERGENCY)
Dept: CT IMAGING | Facility: HOSPITAL | Age: 40
End: 2025-03-27
Payer: COMMERCIAL

## 2025-03-27 ENCOUNTER — HOSPITAL ENCOUNTER (EMERGENCY)
Facility: HOSPITAL | Age: 40
Discharge: HOME | End: 2025-03-27
Attending: EMERGENCY MEDICINE
Payer: COMMERCIAL

## 2025-03-27 VITALS
RESPIRATION RATE: 16 BRPM | WEIGHT: 290 LBS | SYSTOLIC BLOOD PRESSURE: 112 MMHG | HEIGHT: 74 IN | HEART RATE: 82 BPM | BODY MASS INDEX: 37.22 KG/M2 | OXYGEN SATURATION: 98 % | DIASTOLIC BLOOD PRESSURE: 82 MMHG | TEMPERATURE: 98.6 F

## 2025-03-27 DIAGNOSIS — M51.369 BULGING LUMBAR DISC: ICD-10-CM

## 2025-03-27 DIAGNOSIS — M54.50 ACUTE LOW BACK PAIN: Primary | ICD-10-CM

## 2025-03-27 DIAGNOSIS — M48.061 LUMBAR CANAL STENOSIS: ICD-10-CM

## 2025-03-27 PROCEDURE — 99284 EMERGENCY DEPT VISIT MOD MDM: CPT

## 2025-03-27 PROCEDURE — 72131 CT LUMBAR SPINE W/O DYE: CPT

## 2025-03-27 PROCEDURE — 96372 THER/PROPH/DIAG INJ SC/IM: CPT

## 2025-03-27 PROCEDURE — 99283 EMERGENCY DEPT VISIT LOW MDM: CPT

## 2025-03-27 RX ORDER — METHOCARBAMOL 500 MG/1
500 TABLET, FILM COATED ORAL 2 TIMES DAILY
Qty: 20 TABLET | Refills: 0 | Status: SHIPPED | OUTPATIENT
Start: 2025-03-27

## 2025-03-27 RX ORDER — IBUPROFEN 800 MG/1
800 TABLET, FILM COATED ORAL 3 TIMES DAILY
Qty: 30 TABLET | Refills: 0 | Status: SHIPPED | OUTPATIENT
Start: 2025-03-27

## 2025-03-27 RX ORDER — METHOCARBAMOL 500 MG/1
500 TABLET, FILM COATED ORAL ONCE
Status: COMPLETED | OUTPATIENT
Start: 2025-03-27 | End: 2025-03-27

## 2025-03-27 RX ORDER — KETOROLAC TROMETHAMINE 30 MG/ML
30 INJECTION, SOLUTION INTRAMUSCULAR; INTRAVENOUS ONCE
Status: COMPLETED | OUTPATIENT
Start: 2025-03-27 | End: 2025-03-27

## 2025-03-27 RX ORDER — LIDOCAINE 50 MG/G
1 PATCH TOPICAL DAILY
Qty: 9 PATCH | Refills: 0 | Status: SHIPPED | OUTPATIENT
Start: 2025-03-27

## 2025-03-27 RX ORDER — ACETAMINOPHEN 325 MG/1
650 TABLET ORAL ONCE
Status: COMPLETED | OUTPATIENT
Start: 2025-03-27 | End: 2025-03-27

## 2025-03-27 RX ORDER — LIDOCAINE 50 MG/G
1 PATCH TOPICAL ONCE
Status: DISCONTINUED | OUTPATIENT
Start: 2025-03-27 | End: 2025-03-27 | Stop reason: HOSPADM

## 2025-03-27 RX ADMIN — METHOCARBAMOL 500 MG: 500 TABLET ORAL at 09:49

## 2025-03-27 RX ADMIN — KETOROLAC TROMETHAMINE 30 MG: 30 INJECTION, SOLUTION INTRAMUSCULAR at 09:49

## 2025-03-27 RX ADMIN — ACETAMINOPHEN 650 MG: 325 TABLET ORAL at 09:49

## 2025-03-27 RX ADMIN — LIDOCAINE 1 PATCH: 700 PATCH TOPICAL at 09:49

## 2025-03-27 NOTE — ED PROVIDER NOTES
Time reflects when diagnosis was documented in both MDM as applicable and the Disposition within this note       Time User Action Codes Description Comment    3/27/2025 10:31 AM Wiley Madera Add [M54.50] Acute low back pain     3/27/2025 10:32 AM Wiley Madera Add [M53.9] Multilevel degenerative disc disease     3/27/2025 10:32 AM Wiley Madera Remove [M53.9] Multilevel degenerative disc disease     3/27/2025 10:32 AM Wiley Madera Add [M51.369] Bulging lumbar disc     3/27/2025 10:32 AM Wiley Madera Add [M48.061] Lumbar canal stenosis           ED Disposition       ED Disposition   Discharge    Condition   Stable    Date/Time   Thu Mar 27, 2025 10:31 AM    Comment   Mehrdad Aponte discharge to home/self care.                   Assessment & Plan       Medical Decision Making  Patient is a well-appearing 39-year-old male presenting with lower back pain x 1 week. 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 Pain persists so he presents the ED. No falls or trauma. No red flag symptoms. No saddle anesthesia or incontinence. He complains of left lower back pain and rates it a 3/10. He has mild tenderness over this area.  Plan is to treat his pain with Toradol shot, Tylenol, Robaxin, and apply lidocaine patch to the area. He has a history of right sided sciatica and never had formal imaging so we will proceed with CT scan of his lumbar spine to evaluate for deformity, stenosis, herniated disc, bulging disc, etc. Reassessed him after the pain meds and he was feeling much better. He is ambulating without difficulty and his back pain feels much better with movement after the pain meds. CT reveals no acute findings but does demonstrate several bulging discs. Will provide referral to comprehensive spine program and encourage close follow-up with  family doctor. Provided Rx for ibuprofen, Robaxin, and lidocaine patches.  Dispo: Patient is safe/stable for discharge home and was discharged home with strict return precautions. Provided verbal and written supportive care instructions for managing his illness. Advised patient to return to the nearest emergency room if he has new or worsening symptoms or if any questions arise. Advised patient to follow-up with his family doctor and comprehensive spine program. Patient is satisfied with care and agrees with management and plan.     Amount and/or Complexity of Data Reviewed  External Data Reviewed: labs, radiology and notes.  Radiology: ordered. Decision-making details documented in ED Course.    Risk  OTC drugs.  Prescription drug management.        ED Course as of 03/27/25 1206   Thu Mar 27, 2025   0929 Blood Pressure: 148/76  Elevated BP. Other vital signs reviewed and within normal limits.   1050 Went over CT results with patient and girlfriend. He is feeling much better and ambulating without difficulty.  Will dc home with Rx and referral to comp spine.       Medications   lidocaine (LIDODERM) 5 % patch 1 patch (1 patch Topical Medication Applied 3/27/25 0949)   ketorolac (TORADOL) injection 30 mg (30 mg Intramuscular Given 3/27/25 0949)   acetaminophen (TYLENOL) tablet 650 mg (650 mg Oral Given 3/27/25 0949)   methocarbamol (ROBAXIN) tablet 500 mg (500 mg Oral Given 3/27/25 0949)       ED Risk Strat Scores                            SBIRT 20yo+      Flowsheet Row Most Recent Value   Initial Alcohol Screen: US AUDIT-C     1. How often do you have a drink containing alcohol? 0 Filed at: 03/27/2025 0929   2. How many drinks containing alcohol do you have on a typical day you are drinking?  0 Filed at: 03/27/2025 0929   3a. Male UNDER 65: How often do you have five or more drinks on one occasion? 0 Filed at: 03/27/2025 0929   Audit-C Score 0 Filed at: 03/27/2025 0929   [place]: How many times in the past year have  you...    Used an illegal drug or used a prescription medication for non-medical reasons? Never Filed at: 2025 0929                            History of Present Illness       Chief Complaint   Patient presents with    Back Pain     Back pain started a week ago.        Past Medical History:   Diagnosis Date    Diabetes mellitus       History reviewed. No pertinent surgical history.   Family History   Problem Relation Age of Onset    Thyroid disease Mother     Hypertension Father     Hyperlipidemia Father     Diabetes Father     Thyroid disease Father     Heart attack Father       Social History     Tobacco Use    Smoking status: Former     Types: E-Cigarettes     Quit date:      Years since quittin.2     Passive exposure: Past    Smokeless tobacco: Never   Vaping Use    Vaping status: Every Day    Substances: Nicotine, Flavoring   Substance Use Topics    Alcohol use: Not Currently    Drug use: Not Currently      E-Cigarette/Vaping    E-Cigarette Use Current Every Day User       E-Cigarette/Vaping Substances    Nicotine Yes     THC No     CBD No     Flavoring Yes     Other No     Unknown No       I have reviewed and agree with the history as documented.     Patient is a 39-year-old male with relevant past medical history of type 2 diabetes mellitus and DINA presenting with lower back pain x 1 week. 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 Pain persists and he called off work today due to pain. No falls or trauma. No saddle anesthesia or incontinence. He complains of left lower back pain and rates it a 3/10 and describes as achy. Pain is improved with sitting. No radiating pain at rest but he experienced shooting pain down his left lateral leg with ambulation earlier. He did not take anything today for pain. He denies fevers, chills,  paresthesias, neck pain, chest pain, dyspnea, abdominal pain, nausea, or vomiting.      History provided by:  Patient and significant other   used: No    Back Pain  Associated symptoms: no abdominal pain, no chest pain, no dysuria, no fever and no headaches        Review of Systems   Constitutional:  Negative for chills and fever.   HENT:  Negative for congestion, ear pain, rhinorrhea and sore throat.    Eyes:  Negative for pain and visual disturbance.   Respiratory:  Negative for cough and shortness of breath.    Cardiovascular:  Negative for chest pain and palpitations.   Gastrointestinal:  Negative for abdominal pain, constipation, diarrhea, nausea and vomiting.   Genitourinary:  Negative for dysuria, frequency, hematuria and urgency.   Musculoskeletal:  Positive for back pain. Negative for arthralgias.   Skin:  Negative for color change and rash.   Neurological:  Negative for dizziness, seizures, syncope, light-headedness and headaches.   Psychiatric/Behavioral:  Negative for agitation and confusion.            Objective       ED Triage Vitals [03/27/25 0926]   Temperature Pulse Blood Pressure Respirations SpO2 Patient Position - Orthostatic VS   97.7 °F (36.5 °C) 83 148/76 16 94 % Lying      Temp Source Heart Rate Source BP Location FiO2 (%) Pain Score    Temporal Monitor Left arm -- 3      Vitals      Date and Time Temp Pulse SpO2 Resp BP Pain Score FACES Pain Rating User   03/27/25 1105 98.6 °F (37 °C) 82 98 % 16 112/82 -- -- JW   03/27/25 0951 -- -- -- -- -- 3 --    03/27/25 0926 97.7 °F (36.5 °C) 83 94 % 16 148/76 3 -- TG            Physical Exam  Vitals and nursing note reviewed.   Constitutional:       General: He is not in acute distress.     Appearance: He is well-developed. He is obese. He is not ill-appearing.   HENT:      Head: Normocephalic and atraumatic.   Eyes:      Conjunctiva/sclera: Conjunctivae normal.   Cardiovascular:      Rate and Rhythm: Normal rate and regular  rhythm.      Heart sounds: No murmur heard.  Pulmonary:      Effort: Pulmonary effort is normal. No respiratory distress.      Breath sounds: Normal breath sounds. No wheezing, rhonchi or rales.   Abdominal:      Palpations: Abdomen is soft.      Tenderness: There is no abdominal tenderness. There is no guarding or rebound.   Musculoskeletal:         General: No swelling.      Cervical back: Normal and neck supple. No bony tenderness.      Thoracic back: Normal. No bony tenderness.      Lumbar back: Tenderness present. No swelling, edema, deformity, signs of trauma or lacerations. Positive left straight leg raise test. Negative right straight leg raise test.        Back:    Skin:     General: Skin is warm and dry.      Capillary Refill: Capillary refill takes less than 2 seconds.   Neurological:      General: No focal deficit present.      Mental Status: He is alert and oriented to person, place, and time.      GCS: GCS eye subscore is 4. GCS verbal subscore is 5. GCS motor subscore is 6.      Cranial Nerves: Cranial nerves 2-12 are intact.      Sensory: Sensation is intact.      Motor: Motor function is intact.      Coordination: Coordination is intact.      Gait: Gait is intact.   Psychiatric:         Mood and Affect: Mood normal.         Results Reviewed       None            CT lumbar spine without contrast   Final Interpretation by Paul Gordillo MD (03/27 0957)      No acute findings in the lumbar spine. Mild spondylotic changes, as above.      Workstation performed: [id number]             Procedures    ED Medication and Procedure Management   Prior to Admission Medications   Prescriptions Last Dose Informant Patient Reported? Taking?   BD Pen Needle Deb U/F 32G X 4 MM MISC   No No   Sig: Inject under the skin 4 (four) times a day   Continuous Glucose  (Dexcom G7 ) CRISPIN   Yes No   Sig: USE 1 EACH 1 (ONE) TIME FOR 1 DOSE   Continuous Glucose Sensor (Dexcom G7 Sensor)   No No   Sig: Use 1  Device every 10 days E11.65 use 1 device every 10 days   FLUoxetine (PROzac) 20 mg capsule   No No   Sig: Take 2 capsules (40 mg total) by mouth daily   Insulin Regular Human, Conc, (HumuLIN R U-500 KwikPen) 500 units/mL CONCENTRATED U-500 injection pen   No No   Sig: E11.65 inject 140 units before breakfast, and 90 units before supper   amLODIPine (NORVASC) 5 mg tablet   No No   Sig: Take 1 tablet (5 mg total) by mouth daily   famotidine (PEPCID) 20 mg tablet   No No   Sig: Take 1 tablet (20 mg total) by mouth daily at bedtime Take 2 hours prior to bed   fenofibrate micronized (LOFIBRA) 200 MG capsule   No No   Sig: Take 1 capsule (200 mg total) by mouth daily with breakfast   glucagon (GLUCAGEN) 1 mg injection   Yes No   Si mg once   metFORMIN (GLUCOPHAGE-XR) 500 mg 24 hr tablet   No No   Sig: Take 2 tablets (1,000 mg total) by mouth 2 (two) times a day with meals   methocarbamol (ROBAXIN) 500 mg tablet   No No   Sig: Take 1 tablet (500 mg total) by mouth 3 (three) times a day as needed for muscle spasms   Patient not taking: Reported on 2025   omeprazole (PriLOSEC) 20 mg delayed release capsule   No No   Sig: Take 1 capsule (20 mg total) by mouth daily Best to take 30 minutes to 1 hour before 1 meal a day   phentermine 15 MG capsule   No No   Sig: Take 1 capsule (15 mg total) by mouth every morning   rosuvastatin (CRESTOR) 20 MG tablet   No No   Sig: Take 1 tablet (20 mg total) by mouth daily   sildenafil (VIAGRA) 25 MG tablet   No No   Sig: Take 1 tablet (25 mg total) by mouth daily as needed for erectile dysfunction      Facility-Administered Medications: None     Discharge Medication List as of 3/27/2025 11:06 AM        START taking these medications    Details   ibuprofen (MOTRIN) 800 mg tablet Take 1 tablet (800 mg total) by mouth 3 (three) times a day, Starting Thu 3/27/2025, Normal      lidocaine (Lidoderm) 5 % Apply 1 patch topically over 12 hours daily Remove & Discard patch within 12 hours or  as directed by MD, Starting Thu 3/27/2025, Normal      !! methocarbamol (ROBAXIN) 500 mg tablet Take 1 tablet (500 mg total) by mouth 2 (two) times a day, Starting Thu 3/27/2025, Normal       !! - Potential duplicate medications found. Please discuss with provider.        CONTINUE these medications which have NOT CHANGED    Details   amLODIPine (NORVASC) 5 mg tablet Take 1 tablet (5 mg total) by mouth daily, Starting Wed 2/21/2024, Normal      BD Pen Needle Deb U/F 32G X 4 MM MISC Inject under the skin 4 (four) times a day, Starting Thu 2/20/2025, Normal      Continuous Glucose  (Dexcom G7 ) CRISPIN USE 1 EACH 1 (ONE) TIME FOR 1 DOSE, Historical Med      Continuous Glucose Sensor (Dexcom G7 Sensor) Use 1 Device every 10 days E11.65 use 1 device every 10 days, Starting Thu 2/20/2025, Normal      famotidine (PEPCID) 20 mg tablet Take 1 tablet (20 mg total) by mouth daily at bedtime Take 2 hours prior to bed, Starting Wed 2/21/2024, Normal      fenofibrate micronized (LOFIBRA) 200 MG capsule Take 1 capsule (200 mg total) by mouth daily with breakfast, Starting Wed 2/21/2024, Normal      FLUoxetine (PROzac) 20 mg capsule Take 2 capsules (40 mg total) by mouth daily, Starting Wed 2/19/2025, Normal      glucagon (GLUCAGEN) 1 mg injection 1 mg once, Historical Med      Insulin Regular Human, Conc, (HumuLIN R U-500 KwikPen) 500 units/mL CONCENTRATED U-500 injection pen E11.65 inject 140 units before breakfast, and 90 units before supper, Normal      metFORMIN (GLUCOPHAGE-XR) 500 mg 24 hr tablet Take 2 tablets (1,000 mg total) by mouth 2 (two) times a day with meals, Starting Wed 2/21/2024, Normal      !! methocarbamol (ROBAXIN) 500 mg tablet Take 1 tablet (500 mg total) by mouth 3 (three) times a day as needed for muscle spasms, Starting Wed 2/21/2024, Normal      omeprazole (PriLOSEC) 20 mg delayed release capsule Take 1 capsule (20 mg total) by mouth daily Best to take 30 minutes to 1 hour before 1 meal a  day, Starting Wed 2/21/2024, Normal      phentermine 15 MG capsule Take 1 capsule (15 mg total) by mouth every morning, Starting Wed 2/19/2025, Normal      rosuvastatin (CRESTOR) 20 MG tablet Take 1 tablet (20 mg total) by mouth daily, Starting Wed 2/21/2024, Normal      sildenafil (VIAGRA) 25 MG tablet Take 1 tablet (25 mg total) by mouth daily as needed for erectile dysfunction, Starting Tue 3/19/2024, Normal       !! - Potential duplicate medications found. Please discuss with provider.          ED SEPSIS DOCUMENTATION   Time reflects when diagnosis was documented in both MDM as applicable and the Disposition within this note       Time User Action Codes Description Comment    3/27/2025 10:31 AM Wiley Madera [M54.50] Acute low back pain     3/27/2025 10:32 AM Wiley Madera Add [M53.9] Multilevel degenerative disc disease     3/27/2025 10:32 AM Wiley Madera Remove [M53.9] Multilevel degenerative disc disease     3/27/2025 10:32 AM Wiley Madera Add [M51.369] Bulging lumbar disc     3/27/2025 10:32 AM Wiley Madera Add [M48.061] Lumbar canal stenosis                  Wiley Madera PA-C  03/27/25 8417

## 2025-03-27 NOTE — Clinical Note
Mehrdad Aponte was seen and treated in our emergency department on 3/27/2025.    No restrictions            Diagnosis: Lower back pain.    Mehrdad  may return to work on return date.    He may return on this date: 03/31/2025         If you have any questions or concerns, please don't hesitate to call.      Wiley Madera PA-C    ______________________________           _______________          _______________  Hospital Representative                              Date                                Time

## 2025-03-27 NOTE — DISCHARGE INSTRUCTIONS
Immediately return to the emergency room if you experience any new or worsening symptoms or if the symptoms are lasting longer than expected.     Please follow-up with the comprehensive spine program to get established with physical therapy. Follow-up with your family doctor to ensure symptoms are improving.    Please take ibuprofen (Motrin, Advil) or acetaminophen (Tylenol) as needed for pain. These are available over-the-counter. You can take ibuprofen 400-600 mg every 4-6 hours with food for pain. You can also take acetaminophen 500-650 mg every 4-6 hours for pain. Do not exceed 4000 mg of Tylenol a day as this can cause liver damage. Do not drink alcohol with either of these medications. Evidence-based research has shown taking these 2 drugs together work the same (or better in some cases) for pain than opioids or narcotic pain medication.

## 2025-03-28 ENCOUNTER — TELEPHONE (OUTPATIENT)
Dept: PHYSICAL THERAPY | Facility: OTHER | Age: 40
End: 2025-03-28

## 2025-03-28 NOTE — TELEPHONE ENCOUNTER
Call placed to the patient per Comprehensive Sp[facility] Program referral.    Voice message left for patient to call back. Phone number and hours of business provided.     This is the 1st attempt to reach the patient.  Will defer per protocol.    NOTE: Was at work when pain started. Possible WC?

## 2025-04-09 ENCOUNTER — TELEPHONE (OUTPATIENT)
Dept: FAMILY MEDICINE CLINIC | Facility: CLINIC | Age: 40
End: 2025-04-09

## 2025-05-15 DIAGNOSIS — R45.4 ANGER: ICD-10-CM

## 2025-05-15 DIAGNOSIS — F33.0 MILD EPISODE OF RECURRENT MAJOR DEPRESSIVE DISORDER (HCC): ICD-10-CM

## 2025-05-21 ENCOUNTER — APPOINTMENT (OUTPATIENT)
Dept: LAB | Facility: CLINIC | Age: 40
End: 2025-05-21
Payer: COMMERCIAL

## 2025-05-21 DIAGNOSIS — E11.9 TYPE 2 DIABETES MELLITUS WITHOUT COMPLICATION, WITHOUT LONG-TERM CURRENT USE OF INSULIN (HCC): ICD-10-CM

## 2025-05-21 LAB
ALBUMIN SERPL BCG-MCNC: 4.5 G/DL (ref 3.5–5)
ALP SERPL-CCNC: 65 U/L (ref 34–104)
ALT SERPL W P-5'-P-CCNC: 43 U/L (ref 7–52)
ANION GAP SERPL CALCULATED.3IONS-SCNC: 9 MMOL/L (ref 4–13)
AST SERPL W P-5'-P-CCNC: 21 U/L (ref 13–39)
BILIRUB SERPL-MCNC: 0.42 MG/DL (ref 0.2–1)
BUN SERPL-MCNC: 11 MG/DL (ref 5–25)
CALCIUM SERPL-MCNC: 9.3 MG/DL (ref 8.4–10.2)
CHLORIDE SERPL-SCNC: 102 MMOL/L (ref 96–108)
CHOLEST SERPL-MCNC: 159 MG/DL (ref ?–200)
CO2 SERPL-SCNC: 26 MMOL/L (ref 21–32)
CREAT SERPL-MCNC: 0.77 MG/DL (ref 0.6–1.3)
CREAT UR-MCNC: 88.3 MG/DL
EST. AVERAGE GLUCOSE BLD GHB EST-MCNC: 235 MG/DL
GFR SERPL CREATININE-BSD FRML MDRD: 113 ML/MIN/1.73SQ M
GLUCOSE P FAST SERPL-MCNC: 293 MG/DL (ref 65–99)
HBA1C MFR BLD: 9.8 %
HDLC SERPL-MCNC: 26 MG/DL
LDLC SERPL DIRECT ASSAY-MCNC: 64 MG/DL (ref 0–100)
MICROALBUMIN UR-MCNC: 7.5 MG/L
MICROALBUMIN/CREAT 24H UR: 8 MG/G CREATININE (ref 0–30)
POTASSIUM SERPL-SCNC: 4.3 MMOL/L (ref 3.5–5.3)
PROT SERPL-MCNC: 7.5 G/DL (ref 6.4–8.4)
SODIUM SERPL-SCNC: 137 MMOL/L (ref 135–147)
TRIGL SERPL-MCNC: 571 MG/DL (ref ?–150)

## 2025-05-21 PROCEDURE — 82043 UR ALBUMIN QUANTITATIVE: CPT

## 2025-05-21 PROCEDURE — 83036 HEMOGLOBIN GLYCOSYLATED A1C: CPT

## 2025-05-21 PROCEDURE — 80053 COMPREHEN METABOLIC PANEL: CPT

## 2025-05-21 PROCEDURE — 82570 ASSAY OF URINE CREATININE: CPT

## 2025-05-21 PROCEDURE — 80061 LIPID PANEL: CPT

## 2025-05-21 PROCEDURE — 83721 ASSAY OF BLOOD LIPOPROTEIN: CPT

## 2025-05-21 PROCEDURE — 36415 COLL VENOUS BLD VENIPUNCTURE: CPT

## 2025-05-22 ENCOUNTER — OFFICE VISIT (OUTPATIENT)
Dept: ENDOCRINOLOGY | Facility: CLINIC | Age: 40
End: 2025-05-22
Payer: COMMERCIAL

## 2025-05-22 VITALS
BODY MASS INDEX: 35.55 KG/M2 | TEMPERATURE: 97.4 F | HEIGHT: 74 IN | DIASTOLIC BLOOD PRESSURE: 90 MMHG | OXYGEN SATURATION: 97 % | HEART RATE: 87 BPM | SYSTOLIC BLOOD PRESSURE: 120 MMHG | WEIGHT: 277 LBS

## 2025-05-22 DIAGNOSIS — E11.65 TYPE 2 DIABETES MELLITUS WITH HYPERGLYCEMIA, WITHOUT LONG-TERM CURRENT USE OF INSULIN (HCC): Primary | ICD-10-CM

## 2025-05-22 DIAGNOSIS — I10 PRIMARY HYPERTENSION: ICD-10-CM

## 2025-05-22 DIAGNOSIS — K76.0 FATTY LIVER: ICD-10-CM

## 2025-05-22 DIAGNOSIS — Z87.19 HISTORY OF PANCREATITIS: ICD-10-CM

## 2025-05-22 DIAGNOSIS — E78.2 MIXED HYPERLIPIDEMIA: ICD-10-CM

## 2025-05-22 DIAGNOSIS — E66.813 CLASS 3 SEVERE OBESITY DUE TO EXCESS CALORIES WITH SERIOUS COMORBIDITY AND BODY MASS INDEX (BMI) OF 40.0 TO 44.9 IN ADULT: ICD-10-CM

## 2025-05-22 DIAGNOSIS — E11.9 TYPE 2 DIABETES MELLITUS WITHOUT COMPLICATION, WITHOUT LONG-TERM CURRENT USE OF INSULIN (HCC): ICD-10-CM

## 2025-05-22 PROCEDURE — 99214 OFFICE O/P EST MOD 30 MIN: CPT | Performed by: STUDENT IN AN ORGANIZED HEALTH CARE EDUCATION/TRAINING PROGRAM

## 2025-05-22 RX ORDER — PIOGLITAZONE 15 MG/1
15 TABLET ORAL DAILY
Qty: 30 TABLET | Refills: 3 | Status: SHIPPED | OUTPATIENT
Start: 2025-05-22

## 2025-05-22 RX ORDER — ACYCLOVIR 400 MG/1
1 TABLET ORAL
Qty: 9 EACH | Refills: 1 | Status: SHIPPED | OUTPATIENT
Start: 2025-05-22

## 2025-05-22 NOTE — PROGRESS NOTES
Name: Mehrdad Aponte      : 1985      MRN: [id number]  Encounter Provider: Kraig Marley DO  Encounter Date: 2025   Encounter department: [facility]    No chief complaint on file.  :  Assessment & Plan  Type 2 diabetes mellitus with hyperglycemia, without long-term current use of insulin (HCC)  Type 2 Diabetes Mellitus: complicated by severe insulin resistance and history of severe hypertriglyceridemia and pancreatitis.  Experienced an episode of dizziness and loss of consciousness at work approximately 1.5 months ago. Blood sugar levels recorded at 190 mg/dL in the ambulance and 205 mg/dL at the hospital; significant drop in blood pressure noted. Comprehensive discussion regarding the potential benefits of pioglitazone in restoring insulin sensitivity and managing fatty liver disease; risks including potential weight gain and swelling were discussed. Prescription for Dexcom patches will be sent to pharmacy to enable blood sugar monitoring; continue current insulin regimen until supply is exhausted; prescription for pioglitazone 15 mg daily will be provided; advised to contact the office if any concerns arise while on this medication; inform the office if running low on insulin supply before the next visit for guidance on current insulin plan or consideration of U-500 insulin.    Follow-up: The patient will follow up in a few months.    Lab Results   Component Value Date    HGBA1C 9.8 (H) 2025       Orders:  •  pioglitazone (ACTOS) 15 mg tablet; Take 1 tablet (15 mg total) by mouth daily    Class 3 severe obesity due to excess calories with serious comorbidity and body mass index (BMI) of 40.0 to 44.9 in adult         Primary hypertension         Fatty liver         Mixed hyperlipidemia         History of pancreatitis         Type 2 diabetes mellitus without complication, without long-term current use of insulin (HCC)    Lab Results   Component  "Value Date    HGBA1C 9.8 (H) 05/21/2025       Orders:  •  Continuous Glucose Sensor (Dexcom G7 Sensor); Use 1 Device every 10 days E11.65 use 1 device every 10 days        Pertinent Medical History         History of Present Illness   History of Present Illness  The patient is a 39-year-old male here today for a follow-up of type 2 diabetes, which is complicated by severe insulin resistance and a history of severe hypertriglyceridemia and pancreatitis.    Approximately 1.5 months ago, he experienced an episode of syncope at his workplace, preceded by a morning of weakness and dizziness. He was taken to the hospital where he was observed for 6 to 8 hours, but no definitive cause for the episode was identified. During the ambulance ride, his blood pressure was noted to be low, with a systolic reading of 85 and an inability to obtain a diastolic measurement. His blood glucose levels were recorded as 190 in the ambulance and 205 upon hospital admission. During the dizzy spell, he consumed a Dr. Pepper, suspecting hypoglycemia as the potential cause.    At the time of the episode, he was on a new insulin regimen but had not administered it that day as he typically only takes it postprandially. He has since resumed his Tresiba and NovoLog regimen, which he reports having a sufficient supply to last him at least another month. He is currently on Tresiba 120 units and NovoLog 40 units with meals. He also reports a 2-month lapse in his Dexcom patch supply due to a prescription error, which he is seeking to rectify. He has previously tried Ozempic, which resulted in severe diarrhea.      Review of Systems as per HPI         Medical History Reviewed by provider this encounter:  Tobacco  Allergies  Meds  Problems  Med Hx  Surg Hx  Fam Hx     .    Objective   /90 (BP Location: Left arm, Patient Position: Sitting)   Pulse 87   Temp (!) 97.4 °F (36.3 °C)   Ht 6' 2\" (1.88 m)   Wt 126 kg (277 lb)   SpO2 97%   BMI " 35.56 kg/m²      Body mass index is 35.56 kg/m².  Wt Readings from Last 3 Encounters:   05/22/25 126 kg (277 lb)   03/27/25 132 kg (290 lb)   02/19/25 132 kg (290 lb)     Physical Exam  Patient appears well-developed and well-nourished, in no acute distress.  Physical Exam  Vitals reviewed.   Constitutional:       General: He is not in acute distress.     Appearance: Normal appearance.   HENT:      Head: Normocephalic and atraumatic.      Nose: Nose normal.     Eyes:      General: No scleral icterus.     Conjunctiva/sclera: Conjunctivae normal.     Pulmonary:      Effort: Pulmonary effort is normal. No respiratory distress.     Musculoskeletal:         General: No deformity.      Cervical back: Normal range of motion.     Skin:     General: Skin is warm and dry.     Neurological:      Mental Status: He is alert.     Psychiatric:         Mood and Affect: Mood normal.         Behavior: Behavior normal.       Last Eye Exam: 08/06/2024  Last Foot Exam: 01/17/2023  Health Maintenance   Topic Date Due   • Diabetic Foot Exam  01/17/2024   • Diabetic Eye Exam  08/06/2026       Results    Labs: I have reviewed pertinent labs including:     Component      Latest Ref Rng 5/21/2025   Sodium      135 - 147 mmol/L 137    Potassium      3.5 - 5.3 mmol/L 4.3    Chloride      96 - 108 mmol/L 102    Carbon Dioxide      21 - 32 mmol/L 26    ANION GAP      4 - 13 mmol/L 9    BUN      5 - 25 mg/dL 11    Creatinine      0.60 - 1.30 mg/dL 0.77    GLUCOSE, FASTING      65 - 99 mg/dL 293 (H)    Calcium      8.4 - 10.2 mg/dL 9.3    AST      13 - 39 U/L 21    ALT      7 - 52 U/L 43    ALK PHOS      34 - 104 U/L 65    Total Protein      6.4 - 8.4 g/dL 7.5    Albumin      3.5 - 5.0 g/dL 4.5    Total Bilirubin      0.20 - 1.00 mg/dL 0.42    GFR, Calculated      ml/min/1.73sq m 113    Cholesterol      See Comment mg/dL 159    Triglycerides      See Comment mg/dL 571 (H)    HDL      >=40 mg/dL 26 (L)    EXT Creatinine Urine      Reference range  not established. mg/dL 88.3    Albumin,U,Random      <20.0 mg/L 7.5    Albumin Creat Ratio      0 - 30 mg/g creatinine 8    Hemoglobin A1C      Normal 4.0-5.6%; PreDiabetic 5.7-6.4%; Diabetic >=6.5%; Glycemic control for adults with diabetes <7.0% % 9.8 (H)    eAG, EST AVG Glucose      mg/dl 235    LDL CHOLESTEROL DIRECT      0 - 100 mg/dl 64       Legend:  (H) High  (L) Low    There are no Patient Instructions on file for this visit.    Discussed with the patient and all questioned fully answered. He will call me if any problems arise.

## 2025-05-22 NOTE — ASSESSMENT & PLAN NOTE
Type 2 Diabetes Mellitus: complicated by severe insulin resistance and history of severe hypertriglyceridemia and pancreatitis.  Experienced an episode of dizziness and loss of consciousness at work approximately 1.5 months ago. Blood sugar levels recorded at 190 mg/dL in the ambulance and 205 mg/dL at the hospital; significant drop in blood pressure noted. Comprehensive discussion regarding the potential benefits of pioglitazone in restoring insulin sensitivity and managing fatty liver disease; risks including potential weight gain and swelling were discussed. Prescription for Dexcom patches will be sent to pharmacy to enable blood sugar monitoring; continue current insulin regimen until supply is exhausted; prescription for pioglitazone 15 mg daily will be provided; advised to contact the office if any concerns arise while on this medication; inform the office if running low on insulin supply before the next visit for guidance on current insulin plan or consideration of U-500 insulin.    Follow-up: The patient will follow up in a few months.    Lab Results   Component Value Date    HGBA1C 9.8 (H) 05/21/2025       Orders:  •  pioglitazone (ACTOS) 15 mg tablet; Take 1 tablet (15 mg total) by mouth daily

## 2025-05-22 NOTE — ASSESSMENT & PLAN NOTE
Lab Results   Component Value Date    HGBA1C 9.8 (H) 05/21/2025       Orders:  •  Continuous Glucose Sensor (Dexcom G7 Sensor); Use 1 Device every 10 days E11.65 use 1 device every 10 days

## 2025-06-14 DIAGNOSIS — E11.9 TYPE 2 DIABETES MELLITUS WITHOUT COMPLICATION, WITHOUT LONG-TERM CURRENT USE OF INSULIN (HCC): ICD-10-CM

## 2025-06-16 RX ORDER — ACYCLOVIR 400 MG/1
1 TABLET ORAL
Qty: 9 EACH | Refills: 0 | Status: SHIPPED | OUTPATIENT
Start: 2025-06-16

## 2025-06-16 RX ORDER — INSULIN HUMAN 500 [IU]/ML
INJECTION, SOLUTION SUBCUTANEOUS
Qty: 18 ML | Refills: 0 | Status: SHIPPED | OUTPATIENT
Start: 2025-06-16

## 2025-08-21 ENCOUNTER — OFFICE VISIT (OUTPATIENT)
Dept: ENDOCRINOLOGY | Facility: CLINIC | Age: 40
End: 2025-08-21
Payer: COMMERCIAL

## 2025-08-21 VITALS
BODY MASS INDEX: 36.19 KG/M2 | HEIGHT: 74 IN | OXYGEN SATURATION: 98 % | HEART RATE: 64 BPM | WEIGHT: 282 LBS | SYSTOLIC BLOOD PRESSURE: 140 MMHG | TEMPERATURE: 97.6 F | DIASTOLIC BLOOD PRESSURE: 90 MMHG

## 2025-08-21 DIAGNOSIS — E66.01 CLASS 2 SEVERE OBESITY DUE TO EXCESS CALORIES WITH SERIOUS COMORBIDITY AND BODY MASS INDEX (BMI) OF 35.0 TO 35.9 IN ADULT (HCC): ICD-10-CM

## 2025-08-21 DIAGNOSIS — E66.812 CLASS 2 SEVERE OBESITY DUE TO EXCESS CALORIES WITH SERIOUS COMORBIDITY AND BODY MASS INDEX (BMI) OF 35.0 TO 35.9 IN ADULT (HCC): ICD-10-CM

## 2025-08-21 DIAGNOSIS — E11.65 TYPE 2 DIABETES MELLITUS WITH HYPERGLYCEMIA, WITHOUT LONG-TERM CURRENT USE OF INSULIN (HCC): Primary | ICD-10-CM

## 2025-08-21 DIAGNOSIS — E78.2 MIXED HYPERLIPIDEMIA: ICD-10-CM

## 2025-08-21 DIAGNOSIS — E11.9 TYPE 2 DIABETES MELLITUS WITHOUT COMPLICATION, WITHOUT LONG-TERM CURRENT USE OF INSULIN (HCC): ICD-10-CM

## 2025-08-21 PROCEDURE — 95251 CONT GLUC MNTR ANALYSIS I&R: CPT | Performed by: STUDENT IN AN ORGANIZED HEALTH CARE EDUCATION/TRAINING PROGRAM

## 2025-08-21 PROCEDURE — 99214 OFFICE O/P EST MOD 30 MIN: CPT | Performed by: STUDENT IN AN ORGANIZED HEALTH CARE EDUCATION/TRAINING PROGRAM

## 2025-08-21 RX ORDER — TOBRAMYCIN 3 MG/ML
1 SOLUTION/ DROPS OPHTHALMIC
COMMUNITY
Start: 2025-08-07

## 2025-08-21 RX ORDER — INSULIN HUMAN 500 [IU]/ML
INJECTION, SOLUTION SUBCUTANEOUS
Qty: 18 ML | Refills: 3 | Status: SHIPPED | OUTPATIENT
Start: 2025-08-21